# Patient Record
Sex: FEMALE | Race: WHITE | NOT HISPANIC OR LATINO | Employment: UNEMPLOYED | URBAN - METROPOLITAN AREA
[De-identification: names, ages, dates, MRNs, and addresses within clinical notes are randomized per-mention and may not be internally consistent; named-entity substitution may affect disease eponyms.]

---

## 2017-03-07 ENCOUNTER — HOSPITAL ENCOUNTER (OUTPATIENT)
Dept: RADIOLOGY | Facility: MEDICAL CENTER | Age: 52
End: 2017-03-07
Attending: PHYSICIAN ASSISTANT
Payer: COMMERCIAL

## 2017-03-07 ENCOUNTER — OFFICE VISIT (OUTPATIENT)
Dept: URGENT CARE | Facility: PHYSICIAN GROUP | Age: 52
End: 2017-03-07
Payer: COMMERCIAL

## 2017-03-07 VITALS
HEART RATE: 90 BPM | RESPIRATION RATE: 16 BRPM | DIASTOLIC BLOOD PRESSURE: 84 MMHG | OXYGEN SATURATION: 97 % | SYSTOLIC BLOOD PRESSURE: 138 MMHG | WEIGHT: 218 LBS | TEMPERATURE: 98.7 F | BODY MASS INDEX: 35.2 KG/M2

## 2017-03-07 DIAGNOSIS — S20.212A CONTUSION OF RIB ON LEFT SIDE, INITIAL ENCOUNTER: ICD-10-CM

## 2017-03-07 PROCEDURE — 71101 X-RAY EXAM UNILAT RIBS/CHEST: CPT | Mod: LT

## 2017-03-07 PROCEDURE — 99214 OFFICE O/P EST MOD 30 MIN: CPT | Performed by: PHYSICIAN ASSISTANT

## 2017-03-07 RX ORDER — OXYCODONE HYDROCHLORIDE AND ACETAMINOPHEN 5; 325 MG/1; MG/1
1 TABLET ORAL EVERY 4 HOURS PRN
Qty: 6 TAB | Refills: 0 | Status: SHIPPED | OUTPATIENT
Start: 2017-03-07 | End: 2019-08-19

## 2017-03-07 RX ORDER — OXYCODONE HYDROCHLORIDE AND ACETAMINOPHEN 5; 325 MG/1; MG/1
1-2 TABLET ORAL EVERY 4 HOURS PRN
COMMUNITY
End: 2017-03-07

## 2017-03-07 ASSESSMENT — ENCOUNTER SYMPTOMS
CHILLS: 0
PALPITATIONS: 0
BACK PAIN: 1
FEVER: 0
WHEEZING: 0
SHORTNESS OF BREATH: 1
COUGH: 0

## 2017-03-07 NOTE — MR AVS SNAPSHOT
Angélica Beck   3/7/2017 1:30 PM   Office Visit   MRN: [id number]    Department:  [facility]   Dept Phone:  [phone]    Description:  Female : 1965   Provider:  Derrick Fields PA-C           Reason for Visit     Back Pain  back pain  x4days      Allergies as of 3/7/2017     Allergen Noted Reactions    Vicodin [Hydrocodone-Acetaminophen] 2015   Itching      You were diagnosed with     Contusion of rib on left side, initial encounter   [[id number]]         Vital Signs     Blood Pressure Pulse Temperature Respirations Weight Oxygen Saturation    138/84 mmHg 90 37.1 °C (98.7 °F) 16 98.884 kg (218 lb) 97%    Smoking Status                   Never Smoker            Basic Information     Date Of Birth Sex Race Ethnicity Preferred Language    1965 Female White Non- English      Problem List              ICD-10-CM Priority Class Noted - Resolved    BMI 34.0-34.9,adult Z68.34   10/9/2014 - Present    Stress incontinence N39.3   10/9/2014 - Present    Female stress incontinence N39.3   12/3/2014 - Present    Esophageal reflux K21.9   2016 - Present      Health Maintenance        Date Due Completion Dates    COLONOSCOPY 10/16/2013 ---    MAMMOGRAM 10/9/2014 10/9/2013 (Prv Comp)    Override on 10/9/2013: Previously completed    IMM INFLUENZA (1) 2016 ---    IMM DTaP/Tdap/Td Vaccine (2 - Td) 2022            Current Immunizations     Tdap Vaccine 2012      Below and/or attached are the medications your provider expects you to take. Review all of your home medications and newly ordered medications with your provider and/or pharmacist. Follow medication instructions as directed by your provider and/or pharmacist. Please keep your medication list with you and share with your provider. Update the information when medications are discontinued, doses are changed, or new medications (including over-the-counter products) are added; and carry medication  information at all times in the event of emergency situations     Allergies:  VICODIN - Itching               Medications  Valid as of: March 07, 2017 -  2:46 PM    Generic Name Brand Name Tablet Size Instructions for use    HydrOXYzine HCl (Tab) ATARAX 25 MG Take 1 Tab by mouth 3 times a day as needed for Itching.        Omeprazole (CAPSULE DELAYED RELEASE) PRILOSEC 20 MG Take 1 Cap by mouth every day.        Oxycodone-Acetaminophen (Tab) PERCOCET 5-325 MG Take 1-2 Tabs by mouth every four hours as needed.        PARoxetine HCl (TABLET SR 24 HR) PAXIL-CR 12.5 MG Take 1 Tab by mouth every day.        PARoxetine Mesylate (Cap) PARoxetine Mesylate 7.5 MG Take 1 Capsule by mouth every bedtime.        Triamcinolone Acetonide (Ointment) KENALOG 0.1 % Apply thin layer to affected area twice daily as needed.        .                 Medicines prescribed today were sent to:     [facility] [address] [place] [address] [address] [place] [address]. & [facility]    [address] [facility] [address]    Phone: [phone] Fax: [phone]    Open 24 Hours?: No    [facility] [address] [facility] [address] [facility]    [address] [facility] [address]    Phone: [phone] Fax: [phone]    Open 24 Hours?: No      Medication refill instructions:       If your prescription bottle indicates you have medication refills left, it is not necessary to call your provider’s office. Please contact your pharmacy and they will refill your medication.    If your prescription bottle indicates you do not have any refills left, you may request refills at any time through one of the following ways: The online BluelightApp system (except Urgent Care), by calling your provider’s office, or by asking your pharmacy to contact your provider’s office with a refill request. Medication refills are processed only during regular business hours and may not be available until the next business day. Your provider may request  additional information or to have a follow-up visit with you prior to refilling your medication.   *Please Note: Medication refills are assigned a new Rx number when refilled electronically. Your pharmacy may indicate that no refills were authorized even though a new prescription for the same medication is available at the pharmacy. Please request the medicine by name with the pharmacy before contacting your provider for a refill.        Your To Do List     Future Labs/Procedures Complete By Expires    [id number] (WITH 1-VIEW CXR) LEFT  As directed 3/7/2018         [email or web address] Access Code: [email or web address]  Expires: 4/6/2017  1:23 PM    Jifiti.com  A secure, online tool to manage your health information     [employer]’s Jifiti.com® is a secure, online tool that connects you to your personalized health information from the privacy of your home -- day or night - making it very easy for you to manage your healthcare. Once the activation process is completed, you can even access your medical information using the Jifiti.com lucho, which is available for free in the Apple Lucho store or Google Play store.     Jifiti.com provides the following levels of access (as shown below):   My Chart Features   [facility] Doctor [facility]  Specialists [facility]  Urgent  Care Non-[facility]  Primary Care  Doctor   Email your healthcare team securely and privately 24/7 X X X    Manage appointments: schedule your next appointment; view details of past/upcoming appointments X      Request prescription refills. X      View recent personal medical records, including lab and immunizations X X X X   View health record, including health history, allergies, medications X X X X   Read reports about your outpatient visits, procedures, consult and ER notes X X X X   See your discharge summary, which is a recap of your hospital and/or ER visit that includes your diagnosis, lab results, and care plan. X X       How to register for [email or web address]:  1. Go to   [email or web address].  2. Click on the Sign Up Now box, which takes you to the New Member Sign Up page. You will need to provide the following information:  a. Enter your Gondola Access Code exactly as it appears at the top of this page. (You will not need to use this code after you’ve completed the sign-up process. If you do not sign up before the expiration date, you must request a new code.)   b. Enter your date of birth.   c. Enter your home email address.   d. Click Submit, and follow the next screen’s instructions.  3. Create a Gondola ID. This will be your Gondola login ID and cannot be changed, so think of one that is secure and easy to remember.  4. Create a CollabNett password. You can change your password at any time.  5. Enter your Password Reset Question and Answer. This can be used at a later time if you forget your password.   6. Enter your e-mail address. This allows you to receive e-mail notifications when new information is available in Gondola.  7. Click Sign Up. You can now view your health information.    For assistance activating your Gondola account, call [phone]

## 2017-03-07 NOTE — PROGRESS NOTES
Subjective:      Angélica Beck is a 51 y.o. female who presents with Back Pain            Rib Injury  This is a new problem. The current episode started in the past 7 days. The problem occurs constantly. The problem has been gradually worsening. Pertinent negatives include no chest pain, chills, coughing or fever. The symptoms are aggravated by sneezing and coughing. She has tried NSAIDs and oral narcotics for the symptoms. The treatment provided mild relief.   Patient's friend hugged her to try and she felt a rib pop on the left lateral side. Since then she's been having increasing pain especially with deep breathing.      PMH:  has a past medical history of Disease, jaw; Unspecified urinary incontinence; Anesthesia; and Cancer (CMS-[facility]).  MEDS:   Current outpatient prescriptions:   •  oxycodone-acetaminophen (PERCOCET) 5-325 MG Tab, Take 1-2 Tabs by mouth every four hours as needed., Disp: , Rfl:   •  omeprazole (PRILOSEC) 20 MG delayed-release capsule, Take 1 Cap by mouth every day., Disp: 30 Cap, Rfl: 3  •  PARoxetine Mesylate (BRISDELLE) 7.5 MG Cap, Take 1 Capsule by mouth every bedtime., Disp: 30 Cap, Rfl: 1  •  triamcinolone acetonide (KENALOG) 0.1 % Ointment, Apply thin layer to affected area twice daily as needed., Disp: 45 g, Rfl: 0  •  hydrOXYzine (ATARAX) 25 MG Tab, Take 1 Tab by mouth 3 times a day as needed for Itching., Disp: 30 Tab, Rfl: 0  •  paroxetine (PAXIL-CR) 12.5 MG CR tablet, Take 1 Tab by mouth every day., Disp: 30 Tab, Rfl: 11  ALLERGIES:   Allergies   Allergen Reactions   • Vicodin [Hydrocodone-Acetaminophen] Itching     SURGHX:   Past Surgical History   Procedure Laterality Date   • Other orthopedic surgery       shoulder arth   • Tubal coagulation laparoscopic bilateral     • Hysterectomy laparoscopy       fibroidsand endometriosis   • Lumpectomy       breast reduction   • Other       BREAST REDUCTION. with implants   • Bladder sling female  12/3/2014     Performed by Darell Ferris,  M.D. at SURGERY [facility]     SOCHX:  reports that she has never smoked. She has never used smokeless tobacco. She reports that she drinks alcohol. She reports that she does not use illicit drugs.  FH: family history includes Cancer in her maternal grandmother and other; Diabetes in her father; Other in her father.      Review of Systems   Constitutional: Negative for fever and chills.   Respiratory: Positive for shortness of breath. Negative for cough and wheezing.    Cardiovascular: Negative for chest pain, palpitations and leg swelling.   Musculoskeletal: Positive for back pain.        Rib injury       Medications, Allergies, and current problem list reviewed today in Epic     Objective:     /84 mmHg  Pulse 90  Temp(Src) 37.1 °C (98.7 °F)  Resp 16  Wt 98.884 kg (218 lb)  SpO2 97%     Physical Exam   Constitutional: She is oriented to person, place, and time. She appears well-developed and well-nourished. No distress.   HENT:   Head: Normocephalic and atraumatic.   Eyes: Conjunctivae and EOM are normal.   Neck: Normal range of motion. Neck supple.   Cardiovascular: Normal rate, regular rhythm and normal heart sounds.    Pulmonary/Chest: Effort normal and breath sounds normal. No respiratory distress. She has no wheezes. She has no rales. Chest wall is not dull to percussion. She exhibits tenderness and bony tenderness. She exhibits no edema, no deformity, no swelling and no retraction.       Neurological: She is alert and oriented to person, place, and time.   Skin: Skin is warm and dry. She is not diaphoretic.   Psychiatric: She has a normal mood and affect. Her behavior is normal. Judgment and thought content normal.   Nursing note and vitals reviewed.         Xray: no fracture or dislocation by my read. Radiology review pending.       Assessment/Plan:     1. Contusion of rib on left side, initial encounter  [id number] (WITH 1-VIEW CXR) LEFT    oxycodone-acetaminophen (PERCOCET) 5-325 MG  Tab     X-ray negative. Likely a soft tissue injury. PO2 97%  OTC meds and conservative measures as discussed  [facility] Aware web site evaluation: I have obtained and reviewed patient utilization report from [facility] pharmacy database prior to writing prescription for controlled substance II, III or IV. Based on the report and my clinical assessment the prescription is medically necessary.   Patient is cautioned on sedation potential of narcotic medication; no drinking, driving or operating heavy machinery while on this medication.  Encourage deep breathing  Return to clinic or go to ED if symptoms worsen or persist. Indications for ED discussed at length. Patient voices understanding. Follow-up with your primary care provider in 3-5 days. Red flags discussed.    Please note that this dictation was created using voice recognition software. I have made every reasonable attempt to correct obvious errors, but I expect that there are errors of grammar and possibly content that I did not discover before finalizing the note.

## 2018-05-02 ENCOUNTER — OFFICE VISIT (OUTPATIENT)
Dept: MEDICAL GROUP | Facility: CLINIC | Age: 53
End: 2018-05-02
Payer: COMMERCIAL

## 2018-05-02 VITALS
OXYGEN SATURATION: 97 % | BODY MASS INDEX: 31.18 KG/M2 | TEMPERATURE: 97.8 F | HEART RATE: 81 BPM | DIASTOLIC BLOOD PRESSURE: 70 MMHG | WEIGHT: 194 LBS | SYSTOLIC BLOOD PRESSURE: 118 MMHG | RESPIRATION RATE: 14 BRPM | HEIGHT: 66 IN

## 2018-05-02 DIAGNOSIS — R19.7 DIARRHEA, UNSPECIFIED TYPE: ICD-10-CM

## 2018-05-02 DIAGNOSIS — R11.2 NAUSEA AND VOMITING IN ADULT: ICD-10-CM

## 2018-05-02 PROCEDURE — 99214 OFFICE O/P EST MOD 30 MIN: CPT | Performed by: FAMILY MEDICINE

## 2018-05-02 RX ORDER — ONDANSETRON 4 MG/1
4 TABLET, FILM COATED ORAL EVERY 4 HOURS PRN
Qty: 20 TAB | Refills: 0 | Status: SHIPPED | OUTPATIENT
Start: 2018-05-02 | End: 2019-08-19

## 2018-05-02 ASSESSMENT — PATIENT HEALTH QUESTIONNAIRE - PHQ9: CLINICAL INTERPRETATION OF PHQ2 SCORE: 0

## 2018-05-02 NOTE — LETTER
May 2, 2018       Patient: Angélica Beck   YOB: 1965   Date of Visit: 5/2/2018         To Whom It May Concern:    It is my medical opinion that Angélica Beck remain out of work until 2/7/18.    If you have any questions or concerns, please don't hesitate to call [phone]          Sincerely,          Mica Madison M.D.  Electronically Signed

## 2018-05-02 NOTE — PROGRESS NOTES
CC: Diarrhea, vomiting, abdominal pain    HPI:    The patient is a 52-year-old white female who woke up last night with nausea and vomiting and watery diarrhea. She says about 10 years ago she had C. difficile and it smells the same. Denies any blood in her stool. She is having abdominal pain that waxes and wanes. Currently it is is about 5 out of 10 in intensity. She says she generally avoids antibiotics because of her history of C. difficile which she got after taking a course of antibiotics. However, her boyfriend was diagnosed with pneumonia last week and she felt sick as well. She had an old prescription for Cefdinir which she took for about 5 days. She says that her abdomen started feeling queasy a couple of days ago.  She drank a few sips of water this morning which she could keep down but couldn't drink any more than that.  Denies fever, chills, chest pain, chest congestion. She has a slight cough. Denies head congestion. Denies change in bladder habits.      Patient Active Problem List    Diagnosis Date Noted   • Esophageal reflux 01/19/2016   • Female stress incontinence 12/03/2014   • BMI 34.0-34.9,adult 10/09/2014   • Stress incontinence 10/09/2014         Current Outpatient Prescriptions:   •  ondansetron (ZOFRAN) 4 MG Tab tablet, Take 1 Tab by mouth every four hours as needed for Nausea/Vomiting., Disp: 20 Tab, Rfl: 0  •  oxycodone-acetaminophen (PERCOCET) 5-325 MG Tab, Take 1 Tab by mouth every four hours as needed., Disp: 6 Tab, Rfl: 0  •  omeprazole (PRILOSEC) 20 MG delayed-release capsule, Take 1 Cap by mouth every day., Disp: 30 Cap, Rfl: 3  •  PARoxetine Mesylate (BRISDELLE) 7.5 MG Cap, Take 1 Capsule by mouth every bedtime., Disp: 30 Cap, Rfl: 1  •  triamcinolone acetonide (KENALOG) 0.1 % Ointment, Apply thin layer to affected area twice daily as needed., Disp: 45 g, Rfl: 0  •  hydrOXYzine (ATARAX) 25 MG Tab, Take 1 Tab by mouth 3 times a day as needed for Itching., Disp: 30 Tab, Rfl: 0  •   "paroxetine (PAXIL-CR) 12.5 MG CR tablet, Take 1 Tab by mouth every day., Disp: 30 Tab, Rfl: 11    Allergies as of 05/02/2018 - Reviewed 05/02/2018   Allergen Reaction Noted   • Vicodin [hydrocodone-acetaminophen] Itching 09/13/2015       Social History     Social History   • Marital status: Single     Spouse name: N/A   • Number of children: N/A   • Years of education: N/A     Occupational History   • Not on file.     Social History Main Topics   • Smoking status: Never Smoker   • Smokeless tobacco: Never Used   • Alcohol use Yes      Comment: few times a week   • Drug use: No   • Sexual activity: Not on file     Other Topics Concern   • Not on file     Social History Narrative   • No narrative on file       Family History   Problem Relation Age of Onset   • Diabetes Father    • Other Father      parkinsons   • Cancer Other      ovarian   • Cancer Maternal Grandmother      ovarian       Past Medical History:   Diagnosis Date   • Anesthesia     PONV   • Cancer (HCC)     basal cell chest   • Disease, jaw     BONE INFECTION   • Unspecified urinary incontinence        Past Surgical History:   Procedure Laterality Date   • BLADDER SLING FEMALE  12/3/2014    Performed by Darell Ferris M.D. at SURGERY [facility]   • HYSTERECTOMY LAPAROSCOPY      fibroidsand endometriosis   • LUMPECTOMY      breast reduction   • OTHER      BREAST REDUCTION. with implants   • OTHER ORTHOPEDIC SURGERY      shoulder arth   • TUBAL COAGULATION LAPAROSCOPIC BILATERAL         ROS:  As documented above in the history of present illness.    /70   Pulse 81   Temp 36.6 °C (97.8 °F)   Resp 14   Ht 1.676 m (5' 6\")   Wt 88 kg (194 lb)   SpO2 97%   Breastfeeding? No   BMI 31.31 kg/m²     Physical Exam:      GEN:  Alert, oriented, in no distress  HEENT;  PERRLA, EOMI  NECK;  Supple without adenopathy   LUNGS:  Clear to auscultation  CV:  Heart RRR without murmurs or S3 or S4  ABD:  Soft, diffuse mild to moderate tenderness to palpation " without guarding or rebound, nondistended, normal bowel sounds.  No hepatosplenomegaly.  EXT:  Without cyanosis, clubbing, or edema.  NEURO:  Cranial nerves II through XII intact.  Motor function and sensation intact.      Assessment and Plan:     1. Diarrhea, unspecified type    - CDIFF BY PCR; ordered stat  -Treat depending upon results    2. Nausea and vomiting in adult    - ondansetron (ZOFRAN) 4 MG Tab tablet; Take 1 Tab by mouth every four hours as needed for Nausea/Vomiting.  Dispense: 20 Tab; Refill: 0  -Clear liquids for 24 hours, then advance diet as tolerated.  -She is instructed that if she cannot keep fluids down she will need to go to the ER. She expresses understanding of these instructions.      Follow-up pending C. difficile results.          Please note that this dictation was created using voice recognition software. I have worked with consultants from the vendor as well as technical experts from [facility] to optimize the interface. I have made every reasonable attempt to correct obvious errors, but I expect there are errors of steffi and possibly content that I did not discover before finalizing the note.

## 2018-07-19 ENCOUNTER — OFFICE VISIT (OUTPATIENT)
Dept: MEDICAL GROUP | Facility: PHYSICIAN GROUP | Age: 53
End: 2018-07-19
Payer: COMMERCIAL

## 2018-07-19 VITALS
RESPIRATION RATE: 16 BRPM | TEMPERATURE: 96.7 F | SYSTOLIC BLOOD PRESSURE: 110 MMHG | WEIGHT: 191.2 LBS | HEART RATE: 74 BPM | BODY MASS INDEX: 30.73 KG/M2 | DIASTOLIC BLOOD PRESSURE: 72 MMHG | HEIGHT: 66 IN | OXYGEN SATURATION: 97 %

## 2018-07-19 DIAGNOSIS — Z13.29 SCREENING FOR ENDOCRINE DISORDER: ICD-10-CM

## 2018-07-19 DIAGNOSIS — Z13.6 SCREENING FOR CARDIOVASCULAR CONDITION: ICD-10-CM

## 2018-07-19 DIAGNOSIS — Z98.84 S/P BARIATRIC SURGERY: ICD-10-CM

## 2018-07-19 DIAGNOSIS — I83.90 VARICOSE VEIN OF LEG: ICD-10-CM

## 2018-07-19 PROCEDURE — 99214 OFFICE O/P EST MOD 30 MIN: CPT | Performed by: FAMILY MEDICINE

## 2018-07-19 NOTE — PROGRESS NOTES
Chief Complaint   Patient presents with   • Varicose Veins   • Labs Only   • Foot Pain       HISTORY OF PRESENT ILLNESS: Patient is a 52 y.o. female established patient here today for the following concerns:    1. Varicose vein of leg  This is a very pleasant 52 year old female here today to request referral to have her varicose veins taken care of.  She works as a  and is on her feet all the time.  Has tried compression in the past, but now is having pain and swelling despite elevating and compression.  Her mother had a varicose vein break in the kitchen one day and had to go to the hospital to have it ligated.  She is hoping to have them taken care of before they worsen.  She also has been having bilateral dorsal foot pain and swelling.     2. S/P bariatric surgery  3. Screening for endocrine disorder  4. Screening for cardiovascular condition  Underwent gastric sleeve in [place] last year.  She is hoping to have some updated labs to check in on her health status.  She is now down about 20+ lbs!            Past Medical, Social, and Family history reviewed and updated in EPIC    Allergies:Vicodin [hydrocodone-acetaminophen]    Current Outpatient Prescriptions   Medication Sig Dispense Refill   • BIOTIN PO Take  by mouth.     • COLLAGEN PO Take  by mouth.     • Multiple Vitamins-Calcium (ONE-A-DAY WOMENS PO) Take  by mouth.     • MAGNESIUM PO Take  by mouth.     • CALCIUM PO Take  by mouth.     • ondansetron (ZOFRAN) 4 MG Tab tablet Take 1 Tab by mouth every four hours as needed for Nausea/Vomiting. 20 Tab 0   • oxycodone-acetaminophen (PERCOCET) 5-325 MG Tab Take 1 Tab by mouth every four hours as needed. 6 Tab 0   • omeprazole (PRILOSEC) 20 MG delayed-release capsule Take 1 Cap by mouth every day. 30 Cap 3   • PARoxetine Mesylate (BRISDELLE) 7.5 MG Cap Take 1 Capsule by mouth every bedtime. 30 Cap 1   • triamcinolone acetonide (KENALOG) 0.1 % Ointment Apply thin layer to affected area twice daily as  "needed. 45 g 0   • hydrOXYzine (ATARAX) 25 MG Tab Take 1 Tab by mouth 3 times a day as needed for Itching. 30 Tab 0   • paroxetine (PAXIL-CR) 12.5 MG CR tablet Take 1 Tab by mouth every day. 30 Tab 11     No current facility-administered medications for this visit.          ROS:  Review of Systems   Constitutional: Negative for fever, chills, weight loss and malaise/fatigue.   HENT: Negative for ear pain, nosebleeds, congestion, sore throat and neck pain.    Eyes: Negative for blurred vision.   Respiratory: Negative for cough, sputum production, shortness of breath and wheezing.    Cardiovascular: Negative for chest pain, palpitations,  and leg swelling.   Gastrointestinal: Negative for heartburn, nausea, vomiting, diarrhea and abdominal pain.   Genitourinary: Negative for dysuria, urgency and frequency.   Musculoskeletal: Negative for myalgias, back pain and joint pain.   Skin: Negative for rash and itching.   Neurological: Negative for dizziness, tingling, tremors, sensory change, focal weakness and headaches.   Endo/Heme/Allergies: Does not bruise/bleed easily.   Psychiatric/Behavioral: Negative for depression, anxiety, suicidal ideas, insomnia and memory loss.      Exam:  Blood pressure 110/72, pulse 74, temperature 35.9 °C (96.7 °F), resp. rate 16, height 1.676 m (5' 6\"), weight 86.7 kg (191 lb 3.2 oz), SpO2 97 %.    General:  Well nourished, well developed in NAD  Head is grossly normal.  Neck: Supple without JVD   Pulmonary:  Normal effort.   Cardiovascular: Regular rate  Extremities: no clubbing, cyanosis, or bilateral edema, varicosities and spider veins bilaterally.  Psych: affect appropriate      Please note that this dictation was created using voice recognition software. I have made every reasonable attempt to correct obvious errors, but I expect that there are errors of grammar and possibly content that I did not discover before finalizing the note.    Assessment/Plan:  1. Varicose vein of leg  - " REFERRAL TO OTHER    2. S/P bariatric surgery  - CBC WITH DIFFERENTIAL; Future  - COMP METABOLIC PANEL; Future  - TSH WITH REFLEX TO FT4; Future  - IRON/TOTAL IRON BIND; Future  - FERRITIN; Future  - VITAMIN B12; Future  - LIPID PROFILE; Future    3. Screening for endocrine disorder    - TSH WITH REFLEX TO FT4; Future    4. Screening for cardiovascular condition  - COMP METABOLIC PANEL; Future  - LIPID PROFILE; Future

## 2018-07-25 ENCOUNTER — HOSPITAL ENCOUNTER (OUTPATIENT)
Dept: LAB | Facility: MEDICAL CENTER | Age: 53
End: 2018-07-25
Attending: FAMILY MEDICINE
Payer: COMMERCIAL

## 2018-07-25 DIAGNOSIS — Z13.6 SCREENING FOR CARDIOVASCULAR CONDITION: ICD-10-CM

## 2018-07-25 DIAGNOSIS — Z13.29 SCREENING FOR ENDOCRINE DISORDER: ICD-10-CM

## 2018-07-25 DIAGNOSIS — Z98.84 S/P BARIATRIC SURGERY: ICD-10-CM

## 2018-07-25 LAB
ALBUMIN SERPL BCP-MCNC: 4.2 G/DL (ref 3.2–4.9)
ALBUMIN/GLOB SERPL: 1.6 G/DL
ALP SERPL-CCNC: 57 U/L (ref 30–99)
ALT SERPL-CCNC: 17 U/L (ref 2–50)
ANION GAP SERPL CALC-SCNC: 6 MMOL/L (ref 0–11.9)
AST SERPL-CCNC: 20 U/L (ref 12–45)
BASOPHILS # BLD AUTO: 0.7 % (ref 0–1.8)
BASOPHILS # BLD: 0.05 K/UL (ref 0–0.12)
BILIRUB SERPL-MCNC: 0.8 MG/DL (ref 0.1–1.5)
BUN SERPL-MCNC: 21 MG/DL (ref 8–22)
CALCIUM SERPL-MCNC: 9.5 MG/DL (ref 8.5–10.5)
CHLORIDE SERPL-SCNC: 104 MMOL/L (ref 96–112)
CHOLEST SERPL-MCNC: 211 MG/DL (ref 100–199)
CO2 SERPL-SCNC: 30 MMOL/L (ref 20–33)
CREAT SERPL-MCNC: 0.77 MG/DL (ref 0.5–1.4)
EOSINOPHIL # BLD AUTO: 0.28 K/UL (ref 0–0.51)
EOSINOPHIL NFR BLD: 3.7 % (ref 0–6.9)
ERYTHROCYTE [DISTWIDTH] IN BLOOD BY AUTOMATED COUNT: 54.8 FL (ref 35.9–50)
FERRITIN SERPL-MCNC: 25.5 NG/ML (ref 10–291)
GLOBULIN SER CALC-MCNC: 2.7 G/DL (ref 1.9–3.5)
GLUCOSE SERPL-MCNC: 73 MG/DL (ref 65–99)
HCT VFR BLD AUTO: 45 % (ref 37–47)
HDLC SERPL-MCNC: 82 MG/DL
HGB BLD-MCNC: 15.2 G/DL (ref 12–16)
IMM GRANULOCYTES # BLD AUTO: 0.01 K/UL (ref 0–0.11)
IMM GRANULOCYTES NFR BLD AUTO: 0.1 % (ref 0–0.9)
IRON SATN MFR SERPL: 28 % (ref 15–55)
IRON SERPL-MCNC: 104 UG/DL (ref 40–170)
LDLC SERPL CALC-MCNC: 121 MG/DL
LYMPHOCYTES # BLD AUTO: 2.27 K/UL (ref 1–4.8)
LYMPHOCYTES NFR BLD: 29.7 % (ref 22–41)
MCH RBC QN AUTO: 33.1 PG (ref 27–33)
MCHC RBC AUTO-ENTMCNC: 33.8 G/DL (ref 33.6–35)
MCV RBC AUTO: 98 FL (ref 81.4–97.8)
MONOCYTES # BLD AUTO: 0.58 K/UL (ref 0–0.85)
MONOCYTES NFR BLD AUTO: 7.6 % (ref 0–13.4)
NEUTROPHILS # BLD AUTO: 4.45 K/UL (ref 2–7.15)
NEUTROPHILS NFR BLD: 58.2 % (ref 44–72)
NRBC # BLD AUTO: 0 K/UL
NRBC BLD-RTO: 0 /100 WBC
PLATELET # BLD AUTO: 328 K/UL (ref 164–446)
PMV BLD AUTO: 10.5 FL (ref 9–12.9)
POTASSIUM SERPL-SCNC: 4.4 MMOL/L (ref 3.6–5.5)
PROT SERPL-MCNC: 6.9 G/DL (ref 6–8.2)
RBC # BLD AUTO: 4.59 M/UL (ref 4.2–5.4)
SODIUM SERPL-SCNC: 140 MMOL/L (ref 135–145)
TIBC SERPL-MCNC: 374 UG/DL (ref 250–450)
TRIGL SERPL-MCNC: 42 MG/DL (ref 0–149)
TSH SERPL DL<=0.005 MIU/L-ACNC: 1.32 UIU/ML (ref 0.38–5.33)
VIT B12 SERPL-MCNC: 876 PG/ML (ref 211–911)
WBC # BLD AUTO: 7.6 K/UL (ref 4.8–10.8)

## 2018-07-25 PROCEDURE — 80061 LIPID PANEL: CPT

## 2018-07-25 PROCEDURE — 83550 IRON BINDING TEST: CPT

## 2018-07-25 PROCEDURE — 82728 ASSAY OF FERRITIN: CPT

## 2018-07-25 PROCEDURE — 82607 VITAMIN B-12: CPT

## 2018-07-25 PROCEDURE — 85025 COMPLETE CBC W/AUTO DIFF WBC: CPT

## 2018-07-25 PROCEDURE — 84443 ASSAY THYROID STIM HORMONE: CPT

## 2018-07-25 PROCEDURE — 83540 ASSAY OF IRON: CPT

## 2018-07-25 PROCEDURE — 36415 COLL VENOUS BLD VENIPUNCTURE: CPT

## 2018-07-25 PROCEDURE — 80053 COMPREHEN METABOLIC PANEL: CPT

## 2018-07-26 ENCOUNTER — TELEPHONE (OUTPATIENT)
Dept: MEDICAL GROUP | Facility: PHYSICIAN GROUP | Age: 53
End: 2018-07-26

## 2018-07-26 NOTE — TELEPHONE ENCOUNTER
----- Message from Sandra Frazier M.D. sent at 7/26/2018  8:34 AM PDT -----  Blood tests are all normal

## 2018-09-05 ENCOUNTER — TELEPHONE (OUTPATIENT)
Dept: MEDICAL GROUP | Facility: PHYSICIAN GROUP | Age: 53
End: 2018-09-05

## 2018-09-05 DIAGNOSIS — M79.671 FOOT PAIN, BILATERAL: ICD-10-CM

## 2018-09-05 DIAGNOSIS — M79.672 FOOT PAIN, BILATERAL: ICD-10-CM

## 2018-09-05 NOTE — TELEPHONE ENCOUNTER
Pt states the vascular doctor said it was ok to be seen by ortho; that she didn't need to wait.    1. Caller Name: Angélica Beck                                           Call Back Number: [phone] (home) [phone] (work)        Patient approves a detailed voicemail message: N\A    2. SPECIFIC Action To Be Taken: Referral pending, please sign.    3. Diagnosis/Clinical Reason for Request: bilateral foot pain    4. Specialty & Provider Name/Lab/Imaging Location: [facility]    5. Is appointment scheduled for requested order/referral: no    Patient was informed they will receive a return phone call from the office ONLY if there are any questions before processing their request. Advised to call back if they haven't received a call from the referral department in 5 days.

## 2018-12-14 ENCOUNTER — TELEPHONE (OUTPATIENT)
Dept: MEDICAL GROUP | Facility: PHYSICIAN GROUP | Age: 53
End: 2018-12-14

## 2018-12-14 NOTE — TELEPHONE ENCOUNTER
1. Caller Name: Angélica                                          Call Back Number: [phone] (home) [phone] (work)       Patient approves a detailed voicemail message: N\A    Pt called stating she is going out of country to [facility]. Pt wanted to know if we would give her/tell her all the immunizations that she would nee. pt was told to contact a travel clinic to find out which immunizations she would need. Then [facility] with med group

## 2018-12-17 NOTE — TELEPHONE ENCOUNTER
VOICEMAIL  1. Caller Name: Angélica                       Call Back Number: [phone] (home) [phone] (work)     2. Message: Shots they pt needs are Tdap, Hep A, Tyfoid and Malaria.     3. Patient approves office to leave a detailed voicemail/MyChart message: N\A    [facility]

## 2019-01-02 ENCOUNTER — APPOINTMENT (OUTPATIENT)
Dept: RADIOLOGY | Facility: MEDICAL CENTER | Age: 54
End: 2019-01-02
Attending: SURGERY
Payer: COMMERCIAL

## 2019-06-13 ENCOUNTER — HOSPITAL ENCOUNTER (OUTPATIENT)
Dept: RADIOLOGY | Facility: MEDICAL CENTER | Age: 54
End: 2019-06-13
Attending: FAMILY MEDICINE
Payer: COMMERCIAL

## 2019-06-13 ENCOUNTER — OFFICE VISIT (OUTPATIENT)
Dept: URGENT CARE | Facility: PHYSICIAN GROUP | Age: 54
End: 2019-06-13
Payer: COMMERCIAL

## 2019-06-13 VITALS
HEIGHT: 66 IN | WEIGHT: 191 LBS | TEMPERATURE: 97.6 F | HEART RATE: 103 BPM | RESPIRATION RATE: 12 BRPM | DIASTOLIC BLOOD PRESSURE: 84 MMHG | BODY MASS INDEX: 30.7 KG/M2 | SYSTOLIC BLOOD PRESSURE: 98 MMHG | OXYGEN SATURATION: 93 %

## 2019-06-13 DIAGNOSIS — R10.84 GENERALIZED ABDOMINAL PAIN: ICD-10-CM

## 2019-06-13 PROCEDURE — 76705 ECHO EXAM OF ABDOMEN: CPT

## 2019-06-13 PROCEDURE — 99213 OFFICE O/P EST LOW 20 MIN: CPT | Performed by: FAMILY MEDICINE

## 2019-06-13 RX ORDER — SUCRALFATE 1 G/1
1 TABLET ORAL
Qty: 120 TAB | Refills: 0 | Status: SHIPPED | OUTPATIENT
Start: 2019-06-13 | End: 2019-08-19

## 2019-06-13 ASSESSMENT — ENCOUNTER SYMPTOMS
DIARRHEA: 0
CONSTIPATION: 0
ABDOMINAL PAIN: 1
FEVER: 0
NAUSEA: 1
VOMITING: 0

## 2019-06-13 NOTE — PROGRESS NOTES
"Subjective:   Angélica Beck is a 53 y.o. female who presents for Abdominal Pain (severe pain in the R & center, vomitting, nausea (symptoms worsening)  x6 weeks)        Abdominal Pain   This is a new problem. The current episode started more than 1 month ago. The onset quality is undetermined. The problem occurs intermittently. The problem has been waxing and waning. The pain is located in the generalized abdominal region. The pain is moderate. The quality of the pain is colicky and cramping. Associated symptoms include nausea. Pertinent negatives include no constipation, diarrhea, fever or vomiting.     Review of Systems   Constitutional: Negative for fever.   Gastrointestinal: Positive for abdominal pain and nausea. Negative for constipation, diarrhea and vomiting.     Allergies   Allergen Reactions   • Vicodin [Hydrocodone-Acetaminophen] Itching      Objective:   BP (!) 98/84 (BP Location: Right arm, Patient Position: Sitting, BP Cuff Size: Adult)   Pulse (!) 103   Temp 36.4 °C (97.6 °F) (Temporal)   Resp 12   Ht 1.676 m (5' 6\")   Wt 86.6 kg (191 lb)   SpO2 93%   BMI 30.83 kg/m²   Physical Exam   Constitutional: She is oriented to person, place, and time. She appears well-developed and well-nourished. No distress.   HENT:   Head: Normocephalic and atraumatic.   Eyes: Pupils are equal, round, and reactive to light. Conjunctivae and EOM are normal.   Cardiovascular: Normal rate and regular rhythm.    No murmur heard.  Pulmonary/Chest: Effort normal and breath sounds normal. No respiratory distress.   Abdominal: Soft. She exhibits no distension. There is tenderness. There is no rebound and no guarding.   Neurological: She is alert and oriented to person, place, and time. She has normal reflexes. No sensory deficit.   Skin: Skin is warm and dry.   Psychiatric: She has a normal mood and affect.         Assessment/Plan:   1. Generalized abdominal pain  - US-RUQ; Future  - sucralfate (CARAFATE) 1 GM Tab; Take " 1 Tab by mouth 4 Times a Day,Before Meals and at Bedtime.  Dispense: 120 Tab; Refill: 0    Differential diagnosis, natural history, supportive care, and indications for immediate follow-up discussed.

## 2019-07-30 ENCOUNTER — APPOINTMENT (OUTPATIENT)
Dept: RADIOLOGY | Facility: MEDICAL CENTER | Age: 54
End: 2019-07-30
Attending: FAMILY MEDICINE
Payer: COMMERCIAL

## 2019-08-19 SDOH — HEALTH STABILITY: MENTAL HEALTH: HOW MANY STANDARD DRINKS CONTAINING ALCOHOL DO YOU HAVE ON A TYPICAL DAY?: 1 OR 2

## 2019-08-19 SDOH — HEALTH STABILITY: MENTAL HEALTH: HOW OFTEN DO YOU HAVE A DRINK CONTAINING ALCOHOL?: 2-3 TIMES A WEEK

## 2019-08-20 ENCOUNTER — ANESTHESIA (OUTPATIENT)
Dept: SURGERY | Facility: MEDICAL CENTER | Age: 54
End: 2019-08-20
Payer: COMMERCIAL

## 2019-08-20 ENCOUNTER — HOSPITAL ENCOUNTER (OUTPATIENT)
Facility: MEDICAL CENTER | Age: 54
End: 2019-08-20
Attending: SURGERY | Admitting: SURGERY
Payer: COMMERCIAL

## 2019-08-20 ENCOUNTER — ANESTHESIA EVENT (OUTPATIENT)
Dept: SURGERY | Facility: MEDICAL CENTER | Age: 54
End: 2019-08-20
Payer: COMMERCIAL

## 2019-08-20 VITALS
WEIGHT: 199.96 LBS | DIASTOLIC BLOOD PRESSURE: 72 MMHG | TEMPERATURE: 97.4 F | HEIGHT: 66 IN | SYSTOLIC BLOOD PRESSURE: 119 MMHG | RESPIRATION RATE: 14 BRPM | HEART RATE: 83 BPM | OXYGEN SATURATION: 92 % | BODY MASS INDEX: 32.14 KG/M2

## 2019-08-20 DIAGNOSIS — G89.18 POST-OPERATIVE PAIN: ICD-10-CM

## 2019-08-20 LAB — PATHOLOGY CONSULT NOTE: NORMAL

## 2019-08-20 PROCEDURE — 700101 HCHG RX REV CODE 250: Performed by: ANESTHESIOLOGY

## 2019-08-20 PROCEDURE — 501577 HCHG TROCAR, STEP 11MM: Performed by: SURGERY

## 2019-08-20 PROCEDURE — 160039 HCHG SURGERY MINUTES - EA ADDL 1 MIN LEVEL 3: Performed by: SURGERY

## 2019-08-20 PROCEDURE — 160048 HCHG OR STATISTICAL LEVEL 1-5: Performed by: SURGERY

## 2019-08-20 PROCEDURE — 88304 TISSUE EXAM BY PATHOLOGIST: CPT

## 2019-08-20 PROCEDURE — A9270 NON-COVERED ITEM OR SERVICE: HCPCS | Performed by: ANESTHESIOLOGY

## 2019-08-20 PROCEDURE — 700111 HCHG RX REV CODE 636 W/ 250 OVERRIDE (IP): Performed by: ANESTHESIOLOGY

## 2019-08-20 PROCEDURE — 160036 HCHG PACU - EA ADDL 30 MINS PHASE I: Performed by: SURGERY

## 2019-08-20 PROCEDURE — 700102 HCHG RX REV CODE 250 W/ 637 OVERRIDE(OP): Performed by: ANESTHESIOLOGY

## 2019-08-20 PROCEDURE — 160009 HCHG ANES TIME/MIN: Performed by: SURGERY

## 2019-08-20 PROCEDURE — 501838 HCHG SUTURE GENERAL: Performed by: SURGERY

## 2019-08-20 PROCEDURE — 160002 HCHG RECOVERY MINUTES (STAT): Performed by: SURGERY

## 2019-08-20 PROCEDURE — 700105 HCHG RX REV CODE 258: Performed by: SURGERY

## 2019-08-20 PROCEDURE — 160028 HCHG SURGERY MINUTES - 1ST 30 MINS LEVEL 3: Performed by: SURGERY

## 2019-08-20 PROCEDURE — 500002 HCHG ADHESIVE, DERMABOND: Performed by: SURGERY

## 2019-08-20 PROCEDURE — 160035 HCHG PACU - 1ST 60 MINS PHASE I: Performed by: SURGERY

## 2019-08-20 PROCEDURE — 160025 RECOVERY II MINUTES (STATS): Performed by: SURGERY

## 2019-08-20 PROCEDURE — 700101 HCHG RX REV CODE 250: Performed by: SURGERY

## 2019-08-20 PROCEDURE — 501399 HCHG SPECIMAN BAG, ENDO CATC: Performed by: SURGERY

## 2019-08-20 PROCEDURE — 502571 HCHG PACK, LAP CHOLE: Performed by: SURGERY

## 2019-08-20 PROCEDURE — 160046 HCHG PACU - 1ST 60 MINS PHASE II: Performed by: SURGERY

## 2019-08-20 RX ORDER — OXYCODONE HYDROCHLORIDE AND ACETAMINOPHEN 5; 325 MG/1; MG/1
1-2 TABLET ORAL EVERY 4 HOURS PRN
Qty: 30 TAB | Refills: 0 | Status: SHIPPED | OUTPATIENT
Start: 2019-08-20 | End: 2019-08-25

## 2019-08-20 RX ORDER — HYDROMORPHONE HYDROCHLORIDE 1 MG/ML
0.1 INJECTION, SOLUTION INTRAMUSCULAR; INTRAVENOUS; SUBCUTANEOUS
Status: DISCONTINUED | OUTPATIENT
Start: 2019-08-20 | End: 2019-08-20 | Stop reason: HOSPADM

## 2019-08-20 RX ORDER — CEFAZOLIN SODIUM 1 G/3ML
INJECTION, POWDER, FOR SOLUTION INTRAMUSCULAR; INTRAVENOUS PRN
Status: DISCONTINUED | OUTPATIENT
Start: 2019-08-20 | End: 2019-08-20 | Stop reason: HOSPADM

## 2019-08-20 RX ORDER — MIDAZOLAM HYDROCHLORIDE 1 MG/ML
1 INJECTION INTRAMUSCULAR; INTRAVENOUS
Status: DISCONTINUED | OUTPATIENT
Start: 2019-08-20 | End: 2019-08-20 | Stop reason: HOSPADM

## 2019-08-20 RX ORDER — LIDOCAINE HYDROCHLORIDE 20 MG/ML
INJECTION, SOLUTION EPIDURAL; INFILTRATION; INTRACAUDAL; PERINEURAL PRN
Status: DISCONTINUED | OUTPATIENT
Start: 2019-08-20 | End: 2019-08-20 | Stop reason: SURG

## 2019-08-20 RX ORDER — OXYCODONE HYDROCHLORIDE AND ACETAMINOPHEN 5; 325 MG/1; MG/1
2 TABLET ORAL
Status: COMPLETED | OUTPATIENT
Start: 2019-08-20 | End: 2019-08-20

## 2019-08-20 RX ORDER — HYDRALAZINE HYDROCHLORIDE 20 MG/ML
5 INJECTION INTRAMUSCULAR; INTRAVENOUS
Status: DISCONTINUED | OUTPATIENT
Start: 2019-08-20 | End: 2019-08-20 | Stop reason: HOSPADM

## 2019-08-20 RX ORDER — HALOPERIDOL 5 MG/ML
1 INJECTION INTRAMUSCULAR
Status: DISCONTINUED | OUTPATIENT
Start: 2019-08-20 | End: 2019-08-20 | Stop reason: HOSPADM

## 2019-08-20 RX ORDER — BUPIVACAINE HYDROCHLORIDE AND EPINEPHRINE 5; 5 MG/ML; UG/ML
INJECTION, SOLUTION EPIDURAL; INTRACAUDAL; PERINEURAL
Status: DISCONTINUED | OUTPATIENT
Start: 2019-08-20 | End: 2019-08-20 | Stop reason: HOSPADM

## 2019-08-20 RX ORDER — SODIUM CHLORIDE, SODIUM LACTATE, POTASSIUM CHLORIDE, CALCIUM CHLORIDE 600; 310; 30; 20 MG/100ML; MG/100ML; MG/100ML; MG/100ML
INJECTION, SOLUTION INTRAVENOUS CONTINUOUS
Status: DISCONTINUED | OUTPATIENT
Start: 2019-08-20 | End: 2019-08-20 | Stop reason: HOSPADM

## 2019-08-20 RX ORDER — ONDANSETRON 4 MG/1
4 TABLET, FILM COATED ORAL EVERY 4 HOURS PRN
Qty: 10 TAB | Refills: 1 | Status: SHIPPED | OUTPATIENT
Start: 2019-08-20 | End: 2019-08-23

## 2019-08-20 RX ORDER — ROCURONIUM BROMIDE 10 MG/ML
INJECTION, SOLUTION INTRAVENOUS PRN
Status: DISCONTINUED | OUTPATIENT
Start: 2019-08-20 | End: 2019-08-20 | Stop reason: SURG

## 2019-08-20 RX ORDER — OXYCODONE HYDROCHLORIDE AND ACETAMINOPHEN 5; 325 MG/1; MG/1
1 TABLET ORAL
Status: COMPLETED | OUTPATIENT
Start: 2019-08-20 | End: 2019-08-20

## 2019-08-20 RX ORDER — METOPROLOL TARTRATE 1 MG/ML
1 INJECTION, SOLUTION INTRAVENOUS
Status: DISCONTINUED | OUTPATIENT
Start: 2019-08-20 | End: 2019-08-20 | Stop reason: HOSPADM

## 2019-08-20 RX ORDER — HYDROMORPHONE HYDROCHLORIDE 1 MG/ML
0.4 INJECTION, SOLUTION INTRAMUSCULAR; INTRAVENOUS; SUBCUTANEOUS
Status: DISCONTINUED | OUTPATIENT
Start: 2019-08-20 | End: 2019-08-20 | Stop reason: HOSPADM

## 2019-08-20 RX ORDER — BUPIVACAINE HYDROCHLORIDE AND EPINEPHRINE 5; 5 MG/ML; UG/ML
INJECTION, SOLUTION EPIDURAL; INTRACAUDAL; PERINEURAL
Status: DISCONTINUED
Start: 2019-08-20 | End: 2019-08-20 | Stop reason: HOSPADM

## 2019-08-20 RX ORDER — HYDROMORPHONE HYDROCHLORIDE 1 MG/ML
0.2 INJECTION, SOLUTION INTRAMUSCULAR; INTRAVENOUS; SUBCUTANEOUS
Status: DISCONTINUED | OUTPATIENT
Start: 2019-08-20 | End: 2019-08-20 | Stop reason: HOSPADM

## 2019-08-20 RX ORDER — ONDANSETRON 2 MG/ML
INJECTION INTRAMUSCULAR; INTRAVENOUS PRN
Status: DISCONTINUED | OUTPATIENT
Start: 2019-08-20 | End: 2019-08-20 | Stop reason: SURG

## 2019-08-20 RX ORDER — MIDAZOLAM HYDROCHLORIDE 1 MG/ML
INJECTION INTRAMUSCULAR; INTRAVENOUS PRN
Status: DISCONTINUED | OUTPATIENT
Start: 2019-08-20 | End: 2019-08-20 | Stop reason: SURG

## 2019-08-20 RX ORDER — SUCCINYLCHOLINE CHLORIDE 20 MG/ML
INJECTION INTRAMUSCULAR; INTRAVENOUS PRN
Status: DISCONTINUED | OUTPATIENT
Start: 2019-08-20 | End: 2019-08-20 | Stop reason: SURG

## 2019-08-20 RX ORDER — METOCLOPRAMIDE HYDROCHLORIDE 5 MG/ML
INJECTION INTRAMUSCULAR; INTRAVENOUS PRN
Status: DISCONTINUED | OUTPATIENT
Start: 2019-08-20 | End: 2019-08-20 | Stop reason: SURG

## 2019-08-20 RX ORDER — DEXAMETHASONE SODIUM PHOSPHATE 4 MG/ML
INJECTION, SOLUTION INTRA-ARTICULAR; INTRALESIONAL; INTRAMUSCULAR; INTRAVENOUS; SOFT TISSUE PRN
Status: DISCONTINUED | OUTPATIENT
Start: 2019-08-20 | End: 2019-08-20 | Stop reason: SURG

## 2019-08-20 RX ORDER — MEPERIDINE HYDROCHLORIDE 25 MG/ML
12.5 INJECTION INTRAMUSCULAR; INTRAVENOUS; SUBCUTANEOUS
Status: DISCONTINUED | OUTPATIENT
Start: 2019-08-20 | End: 2019-08-20 | Stop reason: HOSPADM

## 2019-08-20 RX ORDER — METOPROLOL TARTRATE 1 MG/ML
INJECTION, SOLUTION INTRAVENOUS PRN
Status: DISCONTINUED | OUTPATIENT
Start: 2019-08-20 | End: 2019-08-20 | Stop reason: SURG

## 2019-08-20 RX ORDER — ONDANSETRON 2 MG/ML
4 INJECTION INTRAMUSCULAR; INTRAVENOUS
Status: DISCONTINUED | OUTPATIENT
Start: 2019-08-20 | End: 2019-08-20 | Stop reason: HOSPADM

## 2019-08-20 RX ORDER — DIPHENHYDRAMINE HYDROCHLORIDE 50 MG/ML
12.5 INJECTION INTRAMUSCULAR; INTRAVENOUS
Status: DISCONTINUED | OUTPATIENT
Start: 2019-08-20 | End: 2019-08-20 | Stop reason: HOSPADM

## 2019-08-20 RX ORDER — LABETALOL HYDROCHLORIDE 5 MG/ML
5 INJECTION, SOLUTION INTRAVENOUS
Status: DISCONTINUED | OUTPATIENT
Start: 2019-08-20 | End: 2019-08-20 | Stop reason: HOSPADM

## 2019-08-20 RX ADMIN — FENTANYL CITRATE 250 MCG: 50 INJECTION, SOLUTION INTRAMUSCULAR; INTRAVENOUS at 13:58

## 2019-08-20 RX ADMIN — ONDANSETRON 4 MG: 2 INJECTION INTRAMUSCULAR; INTRAVENOUS at 13:58

## 2019-08-20 RX ADMIN — LIDOCAINE HYDROCHLORIDE 50 ML: 20 INJECTION, SOLUTION EPIDURAL; INFILTRATION; INTRACAUDAL; PERINEURAL at 13:58

## 2019-08-20 RX ADMIN — DEXAMETHASONE SODIUM PHOSPHATE 4 MG: 4 INJECTION, SOLUTION INTRA-ARTICULAR; INTRALESIONAL; INTRAMUSCULAR; INTRAVENOUS; SOFT TISSUE at 14:12

## 2019-08-20 RX ADMIN — OXYCODONE HYDROCHLORIDE AND ACETAMINOPHEN 2 TABLET: 5; 325 TABLET ORAL at 15:58

## 2019-08-20 RX ADMIN — CEFAZOLIN 2 G: 330 INJECTION, POWDER, FOR SOLUTION INTRAMUSCULAR; INTRAVENOUS at 13:58

## 2019-08-20 RX ADMIN — PROPOFOL 200 MG: 10 INJECTION, EMULSION INTRAVENOUS at 13:58

## 2019-08-20 RX ADMIN — METOCLOPRAMIDE 10 MG: 5 INJECTION, SOLUTION INTRAMUSCULAR; INTRAVENOUS at 14:20

## 2019-08-20 RX ADMIN — ROCURONIUM BROMIDE 50 MG: 10 INJECTION, SOLUTION INTRAVENOUS at 13:58

## 2019-08-20 RX ADMIN — FENTANYL CITRATE 50 MCG: 50 INJECTION, SOLUTION INTRAMUSCULAR; INTRAVENOUS at 15:08

## 2019-08-20 RX ADMIN — PROPOFOL 25 MCG/KG/MIN: 10 INJECTION, EMULSION INTRAVENOUS at 14:15

## 2019-08-20 RX ADMIN — SUGAMMADEX 200 MG: 100 INJECTION, SOLUTION INTRAVENOUS at 14:44

## 2019-08-20 RX ADMIN — SUCCINYLCHOLINE CHLORIDE 100 MG: 20 INJECTION, SOLUTION INTRAMUSCULAR; INTRAVENOUS at 13:58

## 2019-08-20 RX ADMIN — METOPROLOL TARTRATE 5 MG: 5 INJECTION, SOLUTION INTRAVENOUS at 14:42

## 2019-08-20 RX ADMIN — MIDAZOLAM 2 MG: 1 INJECTION INTRAMUSCULAR; INTRAVENOUS at 13:58

## 2019-08-20 RX ADMIN — MEPERIDINE HYDROCHLORIDE 12.5 MG: 25 INJECTION INTRAMUSCULAR; INTRAVENOUS; SUBCUTANEOUS at 15:12

## 2019-08-20 RX ADMIN — SODIUM CHLORIDE, POTASSIUM CHLORIDE, SODIUM LACTATE AND CALCIUM CHLORIDE: 600; 310; 30; 20 INJECTION, SOLUTION INTRAVENOUS at 12:20

## 2019-08-20 ASSESSMENT — PAIN SCALES - GENERAL: PAIN_LEVEL: 3

## 2019-08-20 NOTE — ANESTHESIA PREPROCEDURE EVALUATION
Relevant Problems   GI   (+) Esophageal reflux       Physical Exam    Airway   Mallampati: II  TM distance: >3 FB  Neck ROM: full       Cardiovascular - normal exam  Rhythm: regular  Rate: normal  (-) murmur     Dental - normal exam         Pulmonary - normal exam  Breath sounds clear to auscultation     Abdominal    Neurological - normal exam                 Anesthesia Plan    ASA 2       Plan - general       Airway plan will be ETT  (>1 hr of PONV for a prior GA. Will give zofran and decadron and reglan, and switch from sevo to tiva after the gb is removed to try and mitigate PONV for this surgery)      Induction: intravenous    Postoperative Plan: Postoperative administration of opioids is intended.    Pertinent diagnostic labs and testing reviewed    Informed Consent:    Anesthetic plan and risks discussed with patient.    Use of blood products discussed with: patient whom consented to blood products.

## 2019-08-20 NOTE — DISCHARGE INSTRUCTIONS
ACTIVITY: Rest and take it easy for the first 24 hours.  A responsible adult is recommended to remain with you during that time.  It is normal to feel sleepy.  We encourage you to not do anything that requires balance, judgment or coordination.    MILD FLU-LIKE SYMPTOMS ARE NORMAL. YOU MAY EXPERIENCE GENERALIZED MUSCLE ACHES, THROAT IRRITATION, HEADACHE AND/OR SOME NAUSEA.    FOR 24 HOURS DO NOT:  Drive, operate machinery or run household appliances.  Drink beer or alcoholic beverages.   Make important decisions or sign legal documents.    SPECIAL INSTRUCTIONS: see hand-out    DIET: To avoid nausea, slowly advance diet as tolerated, avoiding spicy or greasy foods for the first day.  Add more substantial food to your diet according to your physician's instructions.  Babies can be fed formula or breast milk as soon as they are hungry.  INCREASE FLUIDS AND FIBER TO AVOID CONSTIPATION.    SURGICAL DRESSING/BATHING: May shower tomorrow, no tub baths/hot tubs/swimming.     FOLLOW-UP APPOINTMENT:  A follow-up appointment should be arranged with your doctor ; call to schedule.    You should CALL YOUR PHYSICIAN if you develop:  Fever greater than 101 degrees F.  Pain not relieved by medication, or persistent nausea or vomiting.  Excessive bleeding (blood soaking through dressing) or unexpected drainage from the wound.  Extreme redness or swelling around the incision site, drainage of pus or foul smelling drainage.  Inability to urinate or empty your bladder within 8 hours.  Problems with breathing or chest pain.    You should call 911 if you develop problems with breathing or chest pain.  If you are unable to contact your doctor or surgical center, you should go to the nearest emergency room or urgent care center.  Physician's telephone #: Dr. Olivo [phone]    If any questions arise, call your doctor.  If your doctor is not available, please feel free to call the Surgical Center at [phone].  The Center is open  Monday through Friday from 7AM to 7PM.  You can also call the HEALTH HOTLINE open 24 hours/day, 7 days/week and speak to a nurse at [phone], or toll free at [phone].    A registered nurse may call you a few days after your surgery to see how you are doing after your procedure.    MEDICATIONS: Resume taking daily medication.  Take prescribed pain medication with food.  If no medication is prescribed, you may take non-aspirin pain medication if needed.  PAIN MEDICATION CAN BE VERY CONSTIPATING.  Take a stool softener or laxative such as senokot, pericolace, or milk of magnesia if needed.    Prescription given for percocet, zofran.  Last pain medication given at 4 PM, next dose at 8 PM.    If your physician has prescribed pain medication that includes Acetaminophen (Tylenol), do not take additional Acetaminophen (Tylenol) while taking the prescribed medication.    Depression / Suicide Risk    As you are discharged from this [facility] facility, it is important to learn how to keep safe from harming yourself.    Recognize the warning signs:  · Abrupt changes in personality, positive or negative- including increase in energy   · Giving away possessions  · Change in eating patterns- significant weight changes-  positive or negative  · Change in sleeping patterns- unable to sleep or sleeping all the time   · Unwillingness or inability to communicate  · Depression  · Unusual sadness, discouragement and loneliness  · Talk of wanting to die  · Neglect of personal appearance   · Rebelliousness- reckless behavior  · Withdrawal from people/activities they love  · Confusion- inability to concentrate     If you or a loved one observes any of these behaviors or has concerns about self-harm, here's what you can do:  · Talk about it- your feelings and reasons for harming yourself  · Remove any means that you might use to hurt yourself (examples: pills, rope, extension cords, firearm)  · Get professional help from the  community (Mental Health, Substance Abuse, psychological counseling)  · Do not be alone:Call your Safe Contact- someone whom you trust who will be there for you.  · Call your local CRISIS HOTLINE 131-8639 or 393-927-8715  · Call your local Children's Mobile Crisis Response Team [place] (030) 427-1946 or www.[employer]  · Call the toll free National Suicide Prevention Hotlines   · National Suicide Prevention Lifeline 635-192-VRRF (8973)  · National Hope Line Network 800-SUICIDE (073-5814)

## 2019-08-20 NOTE — OR SURGEON
Immediate Post OP Note    PreOp Diagnosis: chronic cholecystitis    PostOp Diagnosis: chronic cholecystitis    Procedure(s):  CHOLECYSTECTOMY, LAPAROSCOPIC - Wound Class: Clean Contaminated    Surgeon(s):  NICOLE Douglas M.D.    Anesthesiologist/Type of Anesthesia:  Anesthesiologist: Griffin Joseph M.D./General    Surgical Staff:  Circulator: Sandra Encarnacion R.N.  Relief Circulator: Maynor Pritchard R.N.  Scrub Person: Dixon Salazar    Specimens removed if any:  ID Type Source Tests Collected by Time Destination   A :  Tissue Gallbladder PATHOLOGY SPECIMEN Lior Olivo M.D. 8/20/2019  2:35 PM        Estimated Blood Loss: 25 cc    Findings: adhesions    Complications: none        8/20/2019 2:52 PM Lior Olivo M.D.

## 2019-08-20 NOTE — ANESTHESIA PROCEDURE NOTES
Airway  Date/Time: 8/20/2019 1:58 PM  Performed by: Griffin Joseph M.D.  Authorized by: Griffin Joseph M.D.     Location:  OR  Urgency:  Elective  Indications for Airway Management:  Anesthesia  Spontaneous Ventilation: absent    Sedation Level:  Deep  Preoxygenated: Yes    Patient Position:  Sniffing  Final Airway Type:  Endotracheal airway  Final Endotracheal Airway:  ETT  Cuffed: Yes    Technique Used for Successful ETT Placement:  Direct laryngoscopy  Insertion Site:  Oral  Blade Type:  Yefri  Laryngoscope Blade/Videolaryngoscope Blade Size:  3  ETT Size (mm):  7.0  Measured from:  Teeth  ETT to Teeth (cm):  20  Placement Verified by: auscultation and capnometry    Cormack-Lehane Classification:  Grade I - full view of glottis  Number of Attempts at Approach:  1

## 2019-08-20 NOTE — OR NURSING
1457 Patient arrived from OR on [facility]. Report received from anesthesia and RN. Oral airway in place. Will continue to monitor.   1500 Pt woke up, airway out.   1508 Fentanyl IV given for 7/10 pain.  1512 Pt shivering, medicated with demerol.  1530 Pt's mother at bedside.  1559 Pt denying nausea, medicated with percocet.  1605 Pt meets criteria for phase 2.   1620 Patient verbalized readiness to go home. Discharge instructions discussed with patient and mother, copy given. Patient verbalized understanding to instructions. Prescriptions given . Belongings with patient.   1445 Discharge criteria met. PIV out, Discharged via wheelchair.

## 2019-08-20 NOTE — ANESTHESIA POSTPROCEDURE EVALUATION
Patient: Angélica Beck    Procedure Summary     Date:  08/20/19 Room / Location:  [facility] ROOM 24 / SURGERY SAME DAY [facility]    Anesthesia Start:  1403 Anesthesia Stop:      Procedure:  CHOLECYSTECTOMY, LAPAROSCOPIC (Abdomen) Diagnosis:  (CHOLELITHIASIS WITH CHRONIC CHOLECYSTITIS WITHOUT OBSTRUCTION)    Surgeon:  Lior Olivo M.D. Responsible Provider:  Griffin Joseph M.D.    Anesthesia Type:  general ASA Status:  2          Final Anesthesia Type: general  Last vitals  BP   Blood Pressure: 156/98    Temp   37.2 °C (99 °F)    Pulse   Pulse: (!) 105(pt anxious about procedure.)   Resp   18    SpO2   96 %      Anesthesia Post Evaluation    Patient location during evaluation: PACU  Patient participation: complete - patient participated  Level of consciousness: awake and alert  Pain score: 3    Airway patency: patent  Anesthetic complications: no  Cardiovascular status: hemodynamically stable  Respiratory status: acceptable  Hydration status: euvolemic    PONV: none           Nurse Pain Score: 0 (NPRS)

## 2019-08-20 NOTE — OP REPORT
DATE OF SERVICE:  08/20/2019    SURGEON:  Lior Olivo MD    ASSISTANT:  Anson Leblanc MD    PREOPERATIVE DIAGNOSIS:  Chronic cholecystitis.    POSTOPERATIVE DIAGNOSIS:  Chronic cholecystitis.    PROCEDURE PERFORMED:  Laparoscopic cholecystectomy.    ANESTHESIA:  General endotracheal anesthesia.    ANESTHESIOLOGIST:  Griffin Joseph MD    INDICATIONS:  A 53-year-old woman with symptomatic stones and sludge and   chronic cholecystitis.  A cholecystectomy is indicated.    DESCRIPTION OF PROCEDURE:  The procedure was discussed in detail with the   patient including the laparoscopic approach, potential for converting to an   open procedure, associated risk of bleeding, infection, abscess, and hematoma.    I also discussed risk of postoperative bile leak, common duct stricture,   common duct transection, and significance of these complications.  She   understood all the above and wished to proceed.  She was placed under   anesthesia by Dr. Joseph.  Her abdomen was prepped and draped with   chlorhexidine prep and sterile drapes.  After a time-out, infraumbilical   incision was made and through this incision, peritoneal cavity was entered   using the open Seldinger technique.  Pneumoperitoneum was achieved to   insufflation pressure of 15 mmHg.  Under direct visualization, a 12 mm   subxiphoid and two 5 mm subcostal ports were placed.  The gallbladder was   identified and retracted superiorly and laterally.  Base of gallbladder was   carefully dissected and adhesions were taken down.  The cystic duct was   identified, could be seen to clearly exit the gallbladder and track laterally   along with the gallbladder.  In similar fashion, cystic artery was identified,   dissected away from surrounding connective tissue.  A critical view was   obtained and subsequent to this, 3 clips were placed proximally and distally   and the duct and was divided sharply with scissors.  In similar fashion,   cystic artery was clipped  twice proximally and distally and divided sharply   with scissors.  Gallbladder was then dissected off the liver, placed in a bag   retrieval device and removed.  Hemostasis assured with cautery.  There was no   evidence of any bleeding or bile leak.  Ports were removed.  Pneumoperitoneum   was released.  Infraumbilical fascia was approximated with 0 Vicryl stitches.    Subcutaneous tissue was irrigated and the skin was approximated with 4-0   Vicryl sutures.  Dermabond was placed.  Patient tolerated the procedure well   without apparent complication.  Final counts were reported as correct.       ____________________________________     MD AZAM OLIVA / AMY    DD:  08/20/2019 15:13:04  DT:  08/20/2019 15:23:24    D#:  [id number]  Job#:  [id number]    cc: REGGIE DIXON MD

## 2019-08-20 NOTE — ANESTHESIA TIME REPORT
Anesthesia Start and Stop Event Times     Date Time Event    8/20/2019 1251 Ready for Procedure     1403 Anesthesia Start     1459 Anesthesia Stop        Responsible Staff  08/20/19    Name Role Begin End    Griffin Joseph M.D. Anesth 1403 1458        Preop Diagnosis (Free Text):  Pre-op Diagnosis     CHOLELITHIASIS WITH CHRONIC CHOLECYSTITIS WITHOUT OBSTRUCTION        Preop Diagnosis (Codes):    Post op Diagnosis  Gallstones      Premium Reason  Non-Premium    Comments:

## 2019-09-17 ENCOUNTER — APPOINTMENT (OUTPATIENT)
Dept: RADIOLOGY | Facility: MEDICAL CENTER | Age: 54
DRG: 381 | End: 2019-09-17
Attending: EMERGENCY MEDICINE
Payer: COMMERCIAL

## 2019-09-17 ENCOUNTER — APPOINTMENT (OUTPATIENT)
Dept: RADIOLOGY | Facility: MEDICAL CENTER | Age: 54
DRG: 381 | End: 2019-09-17
Attending: STUDENT IN AN ORGANIZED HEALTH CARE EDUCATION/TRAINING PROGRAM
Payer: COMMERCIAL

## 2019-09-17 ENCOUNTER — HOSPITAL ENCOUNTER (INPATIENT)
Facility: MEDICAL CENTER | Age: 54
LOS: 1 days | DRG: 381 | End: 2019-09-18
Attending: EMERGENCY MEDICINE | Admitting: INTERNAL MEDICINE
Payer: COMMERCIAL

## 2019-09-17 DIAGNOSIS — K92.2 ACUTE UPPER GI BLEED: ICD-10-CM

## 2019-09-17 DIAGNOSIS — K92.1 GASTROINTESTINAL HEMORRHAGE WITH MELENA: ICD-10-CM

## 2019-09-17 DIAGNOSIS — K21.9 GASTROESOPHAGEAL REFLUX DISEASE, ESOPHAGITIS PRESENCE NOT SPECIFIED: ICD-10-CM

## 2019-09-17 PROBLEM — E87.6 HYPOKALEMIA: Status: ACTIVE | Noted: 2019-09-17

## 2019-09-17 PROBLEM — K70.10 ALCOHOLIC HEPATITIS: Status: ACTIVE | Noted: 2019-09-17

## 2019-09-17 PROBLEM — D75.89 MACROCYTOSIS WITHOUT ANEMIA: Status: ACTIVE | Noted: 2019-09-17

## 2019-09-17 PROBLEM — F10.29 ALCOHOL DEPENDENCE WITH UNSPECIFIED ALCOHOL-INDUCED DISORDER (HCC): Status: ACTIVE | Noted: 2019-09-17

## 2019-09-17 LAB
25(OH)D3 SERPL-MCNC: 55 NG/ML (ref 30–100)
ABO GROUP BLD: NORMAL
ALBUMIN SERPL BCP-MCNC: 4.4 G/DL (ref 3.2–4.9)
ALBUMIN/GLOB SERPL: 1.4 G/DL
ALP SERPL-CCNC: 67 U/L (ref 30–99)
ALT SERPL-CCNC: 77 U/L (ref 2–50)
ANION GAP SERPL CALC-SCNC: 15 MMOL/L (ref 0–11.9)
APTT PPP: 26.3 SEC (ref 24.7–36)
AST SERPL-CCNC: 87 U/L (ref 12–45)
BASOPHILS # BLD AUTO: 0.4 % (ref 0–1.8)
BASOPHILS # BLD: 0.04 K/UL (ref 0–0.12)
BILIRUB SERPL-MCNC: 2.2 MG/DL (ref 0.1–1.5)
BLD GP AB SCN SERPL QL: NORMAL
BUN SERPL-MCNC: 22 MG/DL (ref 8–22)
CALCIUM SERPL-MCNC: 9.9 MG/DL (ref 8.5–10.5)
CHLORIDE SERPL-SCNC: 96 MMOL/L (ref 96–112)
CO2 SERPL-SCNC: 27 MMOL/L (ref 20–33)
CREAT SERPL-MCNC: 0.74 MG/DL (ref 0.5–1.4)
EOSINOPHIL # BLD AUTO: 0.04 K/UL (ref 0–0.51)
EOSINOPHIL NFR BLD: 0.4 % (ref 0–6.9)
ERYTHROCYTE [DISTWIDTH] IN BLOOD BY AUTOMATED COUNT: 50 FL (ref 35.9–50)
ETHANOL BLD-MCNC: 0 G/DL
FERRITIN SERPL-MCNC: 301.6 NG/ML (ref 10–291)
FOLATE SERPL-MCNC: 16.6 NG/ML
GLOBULIN SER CALC-MCNC: 3.2 G/DL (ref 1.9–3.5)
GLUCOSE SERPL-MCNC: 146 MG/DL (ref 65–99)
HCT VFR BLD AUTO: 34.3 % (ref 37–47)
HCT VFR BLD AUTO: 40.8 % (ref 37–47)
HGB BLD-MCNC: 11.2 G/DL (ref 12–16)
HGB BLD-MCNC: 13.7 G/DL (ref 12–16)
IMM GRANULOCYTES # BLD AUTO: 0.04 K/UL (ref 0–0.11)
IMM GRANULOCYTES NFR BLD AUTO: 0.4 % (ref 0–0.9)
INR PPP: 0.9 (ref 0.87–1.13)
IRON SATN MFR SERPL: 47 % (ref 15–55)
IRON SERPL-MCNC: 169 UG/DL (ref 40–170)
LIPASE SERPL-CCNC: 29 U/L (ref 11–82)
LYMPHOCYTES # BLD AUTO: 1.6 K/UL (ref 1–4.8)
LYMPHOCYTES NFR BLD: 15.8 % (ref 22–41)
MAGNESIUM SERPL-MCNC: 1.3 MG/DL (ref 1.5–2.5)
MCH RBC QN AUTO: 34.9 PG (ref 27–33)
MCHC RBC AUTO-ENTMCNC: 33.6 G/DL (ref 33.6–35)
MCV RBC AUTO: 103.8 FL (ref 81.4–97.8)
MONOCYTES # BLD AUTO: 0.92 K/UL (ref 0–0.85)
MONOCYTES NFR BLD AUTO: 9.1 % (ref 0–13.4)
NEUTROPHILS # BLD AUTO: 7.46 K/UL (ref 2–7.15)
NEUTROPHILS NFR BLD: 73.9 % (ref 44–72)
NRBC # BLD AUTO: 0 K/UL
NRBC BLD-RTO: 0 /100 WBC
PHOSPHATE SERPL-MCNC: 2.8 MG/DL (ref 2.5–4.5)
PLATELET # BLD AUTO: 258 K/UL (ref 164–446)
PMV BLD AUTO: 10.3 FL (ref 9–12.9)
POTASSIUM SERPL-SCNC: 3.3 MMOL/L (ref 3.6–5.5)
PROT SERPL-MCNC: 7.6 G/DL (ref 6–8.2)
PROTHROMBIN TIME: 12.3 SEC (ref 12–14.6)
RBC # BLD AUTO: 3.93 M/UL (ref 4.2–5.4)
RH BLD: NORMAL
SODIUM SERPL-SCNC: 138 MMOL/L (ref 135–145)
TIBC SERPL-MCNC: 356 UG/DL (ref 250–450)
VIT B12 SERPL-MCNC: 941 PG/ML (ref 211–911)
WBC # BLD AUTO: 10.1 K/UL (ref 4.8–10.8)

## 2019-09-17 PROCEDURE — 82180 ASSAY OF ASCORBIC ACID: CPT

## 2019-09-17 PROCEDURE — 96376 TX/PRO/DX INJ SAME DRUG ADON: CPT

## 2019-09-17 PROCEDURE — 82728 ASSAY OF FERRITIN: CPT

## 2019-09-17 PROCEDURE — 84590 ASSAY OF VITAMIN A: CPT

## 2019-09-17 PROCEDURE — 80053 COMPREHEN METABOLIC PANEL: CPT | Mod: 91

## 2019-09-17 PROCEDURE — 700102 HCHG RX REV CODE 250 W/ 637 OVERRIDE(OP): Performed by: INTERNAL MEDICINE

## 2019-09-17 PROCEDURE — 700105 HCHG RX REV CODE 258: Performed by: INTERNAL MEDICINE

## 2019-09-17 PROCEDURE — 84100 ASSAY OF PHOSPHORUS: CPT | Mod: 91

## 2019-09-17 PROCEDURE — 84425 ASSAY OF VITAMIN B-1: CPT

## 2019-09-17 PROCEDURE — C9113 INJ PANTOPRAZOLE SODIUM, VIA: HCPCS | Performed by: INTERNAL MEDICINE

## 2019-09-17 PROCEDURE — 0DJ08ZZ INSPECTION OF UPPER INTESTINAL TRACT, VIA NATURAL OR ARTIFICIAL OPENING ENDOSCOPIC: ICD-10-PCS | Performed by: INTERNAL MEDICINE

## 2019-09-17 PROCEDURE — 85576 BLOOD PLATELET AGGREGATION: CPT

## 2019-09-17 PROCEDURE — 96366 THER/PROPH/DIAG IV INF ADDON: CPT

## 2019-09-17 PROCEDURE — 82105 ALPHA-FETOPROTEIN SERUM: CPT

## 2019-09-17 PROCEDURE — 700111 HCHG RX REV CODE 636 W/ 250 OVERRIDE (IP): Performed by: INTERNAL MEDICINE

## 2019-09-17 PROCEDURE — 71045 X-RAY EXAM CHEST 1 VIEW: CPT

## 2019-09-17 PROCEDURE — 500066 HCHG BITE BLOCK, ECT: Performed by: INTERNAL MEDICINE

## 2019-09-17 PROCEDURE — 86900 BLOOD TYPING SEROLOGIC ABO: CPT

## 2019-09-17 PROCEDURE — 85025 COMPLETE CBC W/AUTO DIFF WBC: CPT | Mod: 91

## 2019-09-17 PROCEDURE — 83690 ASSAY OF LIPASE: CPT

## 2019-09-17 PROCEDURE — 160048 HCHG OR STATISTICAL LEVEL 1-5: Performed by: INTERNAL MEDICINE

## 2019-09-17 PROCEDURE — 770020 HCHG ROOM/CARE - TELE (206)

## 2019-09-17 PROCEDURE — 83540 ASSAY OF IRON: CPT

## 2019-09-17 PROCEDURE — 160203 HCHG ENDO MINUTES - 1ST 30 MINS LEVEL 4: Performed by: INTERNAL MEDICINE

## 2019-09-17 PROCEDURE — 85018 HEMOGLOBIN: CPT

## 2019-09-17 PROCEDURE — 85610 PROTHROMBIN TIME: CPT

## 2019-09-17 PROCEDURE — A9270 NON-COVERED ITEM OR SERVICE: HCPCS | Performed by: INTERNAL MEDICINE

## 2019-09-17 PROCEDURE — 80307 DRUG TEST PRSMV CHEM ANLYZR: CPT

## 2019-09-17 PROCEDURE — 86850 RBC ANTIBODY SCREEN: CPT

## 2019-09-17 PROCEDURE — 96375 TX/PRO/DX INJ NEW DRUG ADDON: CPT

## 2019-09-17 PROCEDURE — 83550 IRON BINDING TEST: CPT

## 2019-09-17 PROCEDURE — 502240 HCHG MISC OR SUPPLY RC 0272: Performed by: INTERNAL MEDICINE

## 2019-09-17 PROCEDURE — 3E0234Z INTRODUCTION OF SERUM, TOXOID AND VACCINE INTO MUSCLE, PERCUTANEOUS APPROACH: ICD-10-PCS | Performed by: INTERNAL MEDICINE

## 2019-09-17 PROCEDURE — 86901 BLOOD TYPING SEROLOGIC RH(D): CPT

## 2019-09-17 PROCEDURE — 85384 FIBRINOGEN ACTIVITY: CPT

## 2019-09-17 PROCEDURE — 85730 THROMBOPLASTIN TIME PARTIAL: CPT

## 2019-09-17 PROCEDURE — 82306 VITAMIN D 25 HYDROXY: CPT

## 2019-09-17 PROCEDURE — 96365 THER/PROPH/DIAG IV INF INIT: CPT

## 2019-09-17 PROCEDURE — 99285 EMERGENCY DEPT VISIT HI MDM: CPT

## 2019-09-17 PROCEDURE — 99223 1ST HOSP IP/OBS HIGH 75: CPT | Performed by: INTERNAL MEDICINE

## 2019-09-17 PROCEDURE — HZ2ZZZZ DETOXIFICATION SERVICES FOR SUBSTANCE ABUSE TREATMENT: ICD-10-PCS | Performed by: INTERNAL MEDICINE

## 2019-09-17 PROCEDURE — 82607 VITAMIN B-12: CPT

## 2019-09-17 PROCEDURE — 700105 HCHG RX REV CODE 258: Performed by: EMERGENCY MEDICINE

## 2019-09-17 PROCEDURE — 85014 HEMATOCRIT: CPT

## 2019-09-17 PROCEDURE — 36415 COLL VENOUS BLD VENIPUNCTURE: CPT

## 2019-09-17 PROCEDURE — 84630 ASSAY OF ZINC: CPT

## 2019-09-17 PROCEDURE — 83735 ASSAY OF MAGNESIUM: CPT | Mod: 91

## 2019-09-17 PROCEDURE — 82746 ASSAY OF FOLIC ACID SERUM: CPT

## 2019-09-17 PROCEDURE — 700111 HCHG RX REV CODE 636 W/ 250 OVERRIDE (IP): Performed by: EMERGENCY MEDICINE

## 2019-09-17 PROCEDURE — 85347 COAGULATION TIME ACTIVATED: CPT

## 2019-09-17 PROCEDURE — 90686 IIV4 VACC NO PRSV 0.5 ML IM: CPT | Performed by: INTERNAL MEDICINE

## 2019-09-17 RX ORDER — LORAZEPAM 2 MG/1
2 TABLET ORAL
Status: DISCONTINUED | OUTPATIENT
Start: 2019-09-17 | End: 2019-09-18 | Stop reason: HOSPADM

## 2019-09-17 RX ORDER — PROPOFOL 10 MG/ML
INJECTION, EMULSION INTRAVENOUS
Status: COMPLETED | OUTPATIENT
Start: 2019-09-17 | End: 2019-09-17

## 2019-09-17 RX ORDER — ONDANSETRON 4 MG/1
4 TABLET, ORALLY DISINTEGRATING ORAL EVERY 4 HOURS PRN
Status: DISCONTINUED | OUTPATIENT
Start: 2019-09-17 | End: 2019-09-18 | Stop reason: HOSPADM

## 2019-09-17 RX ORDER — THIAMINE MONONITRATE (VIT B1) 100 MG
100 TABLET ORAL DAILY
Status: DISCONTINUED | OUTPATIENT
Start: 2019-09-18 | End: 2019-09-18 | Stop reason: HOSPADM

## 2019-09-17 RX ORDER — FOLIC ACID 1 MG/1
1 TABLET ORAL DAILY
Status: DISCONTINUED | OUTPATIENT
Start: 2019-09-17 | End: 2019-09-18 | Stop reason: HOSPADM

## 2019-09-17 RX ORDER — LORAZEPAM 2 MG/ML
0.5 INJECTION INTRAMUSCULAR EVERY 4 HOURS PRN
Status: DISCONTINUED | OUTPATIENT
Start: 2019-09-17 | End: 2019-09-18 | Stop reason: HOSPADM

## 2019-09-17 RX ORDER — ONDANSETRON 2 MG/ML
4 INJECTION INTRAMUSCULAR; INTRAVENOUS EVERY 4 HOURS PRN
Status: DISCONTINUED | OUTPATIENT
Start: 2019-09-17 | End: 2019-09-18 | Stop reason: HOSPADM

## 2019-09-17 RX ORDER — LORAZEPAM 2 MG/ML
2 INJECTION INTRAMUSCULAR
Status: DISCONTINUED | OUTPATIENT
Start: 2019-09-17 | End: 2019-09-18 | Stop reason: HOSPADM

## 2019-09-17 RX ORDER — LORAZEPAM 2 MG/ML
1 INJECTION INTRAMUSCULAR
Status: DISCONTINUED | OUTPATIENT
Start: 2019-09-17 | End: 2019-09-18 | Stop reason: HOSPADM

## 2019-09-17 RX ORDER — LORAZEPAM 1 MG/1
1 TABLET ORAL EVERY 4 HOURS PRN
Status: DISCONTINUED | OUTPATIENT
Start: 2019-09-17 | End: 2019-09-18 | Stop reason: HOSPADM

## 2019-09-17 RX ORDER — SODIUM CHLORIDE 9 MG/ML
1000 INJECTION, SOLUTION INTRAVENOUS ONCE
Status: COMPLETED | OUTPATIENT
Start: 2019-09-17 | End: 2019-09-17

## 2019-09-17 RX ORDER — LORAZEPAM 0.5 MG/1
0.5 TABLET ORAL EVERY 4 HOURS PRN
Status: DISCONTINUED | OUTPATIENT
Start: 2019-09-17 | End: 2019-09-18 | Stop reason: HOSPADM

## 2019-09-17 RX ORDER — ONDANSETRON 2 MG/ML
4 INJECTION INTRAMUSCULAR; INTRAVENOUS ONCE
Status: COMPLETED | OUTPATIENT
Start: 2019-09-17 | End: 2019-09-17

## 2019-09-17 RX ORDER — ACETAMINOPHEN 325 MG/1
650 TABLET ORAL EVERY 6 HOURS PRN
Status: DISCONTINUED | OUTPATIENT
Start: 2019-09-17 | End: 2019-09-18 | Stop reason: HOSPADM

## 2019-09-17 RX ORDER — CLONIDINE HYDROCHLORIDE 0.1 MG/1
0.1 TABLET ORAL
Status: DISCONTINUED | OUTPATIENT
Start: 2019-09-17 | End: 2019-09-18 | Stop reason: HOSPADM

## 2019-09-17 RX ORDER — LORAZEPAM 2 MG/1
4 TABLET ORAL
Status: DISCONTINUED | OUTPATIENT
Start: 2019-09-17 | End: 2019-09-18 | Stop reason: HOSPADM

## 2019-09-17 RX ORDER — LORAZEPAM 2 MG/ML
1.5 INJECTION INTRAMUSCULAR
Status: DISCONTINUED | OUTPATIENT
Start: 2019-09-17 | End: 2019-09-18 | Stop reason: HOSPADM

## 2019-09-17 RX ORDER — SUCRALFATE ORAL 1 G/10ML
1 SUSPENSION ORAL EVERY 6 HOURS
Status: DISCONTINUED | OUTPATIENT
Start: 2019-09-17 | End: 2019-09-18 | Stop reason: HOSPADM

## 2019-09-17 RX ADMIN — POTASSIUM CHLORIDE: 149 INJECTION, SOLUTION, CONCENTRATE INTRAVENOUS at 23:59

## 2019-09-17 RX ADMIN — PROPOFOL 30 MG: 10 INJECTION, EMULSION INTRAVENOUS at 11:07

## 2019-09-17 RX ADMIN — PROPOFOL 20 MG: 10 INJECTION, EMULSION INTRAVENOUS at 11:06

## 2019-09-17 RX ADMIN — SODIUM CHLORIDE 8 MG/HR: 9 INJECTION, SOLUTION INTRAVENOUS at 22:20

## 2019-09-17 RX ADMIN — PROPOFOL 20 MG: 10 INJECTION, EMULSION INTRAVENOUS at 11:08

## 2019-09-17 RX ADMIN — ONDANSETRON 4 MG: 2 INJECTION INTRAMUSCULAR; INTRAVENOUS at 08:42

## 2019-09-17 RX ADMIN — SUCRALFATE 1 G: 1 SUSPENSION ORAL at 17:02

## 2019-09-17 RX ADMIN — SODIUM CHLORIDE 80 MG: 9 INJECTION, SOLUTION INTRAVENOUS at 10:37

## 2019-09-17 RX ADMIN — ONDANSETRON 4 MG: 2 INJECTION INTRAMUSCULAR; INTRAVENOUS at 10:23

## 2019-09-17 RX ADMIN — PROPOFOL 20 MG: 10 INJECTION, EMULSION INTRAVENOUS at 11:05

## 2019-09-17 RX ADMIN — FOLIC ACID 1 MG: 1 TABLET ORAL at 12:49

## 2019-09-17 RX ADMIN — PROPOFOL 40 MG: 10 INJECTION, EMULSION INTRAVENOUS at 11:04

## 2019-09-17 RX ADMIN — FAMOTIDINE 20 MG: 10 INJECTION INTRAVENOUS at 08:42

## 2019-09-17 RX ADMIN — SODIUM CHLORIDE 8 MG/HR: 9 INJECTION, SOLUTION INTRAVENOUS at 11:21

## 2019-09-17 RX ADMIN — INFLUENZA A VIRUS A/BRISBANE/02/2018 IVR-190 (H1N1) ANTIGEN (FORMALDEHYDE INACTIVATED), INFLUENZA A VIRUS A/KANSAS/14/2017 X-327 (H3N2) ANTIGEN (FORMALDEHYDE INACTIVATED), INFLUENZA B VIRUS B/PHUKET/3073/2013 ANTIGEN (FORMALDEHYDE INACTIVATED), AND INFLUENZA B VIRUS B/MARYLAND/15/2016 BX-69A ANTIGEN (FORMALDEHYDE INACTIVATED) 0.5 ML: 15; 15; 15; 15 INJECTION, SUSPENSION INTRAMUSCULAR at 16:24

## 2019-09-17 RX ADMIN — SUCRALFATE 1 G: 1 SUSPENSION ORAL at 12:49

## 2019-09-17 RX ADMIN — SUCRALFATE 1 G: 1 SUSPENSION ORAL at 23:59

## 2019-09-17 RX ADMIN — THIAMINE HYDROCHLORIDE 500 MG: 100 INJECTION, SOLUTION INTRAMUSCULAR; INTRAVENOUS at 13:04

## 2019-09-17 RX ADMIN — SODIUM CHLORIDE 1000 ML: 9 INJECTION, SOLUTION INTRAVENOUS at 08:39

## 2019-09-17 RX ADMIN — THERA TABS 1 TABLET: TAB at 12:49

## 2019-09-17 RX ADMIN — POTASSIUM CHLORIDE: 149 INJECTION, SOLUTION, CONCENTRATE INTRAVENOUS at 13:43

## 2019-09-17 RX ADMIN — PROPOFOL 20 MG: 10 INJECTION, EMULSION INTRAVENOUS at 11:09

## 2019-09-17 RX ADMIN — PROPOFOL 20 MG: 10 INJECTION, EMULSION INTRAVENOUS at 11:07

## 2019-09-17 ASSESSMENT — ENCOUNTER SYMPTOMS
DEPRESSION: 0
WHEEZING: 0
WEIGHT LOSS: 0
ABDOMINAL PAIN: 1
HEARTBURN: 1
LOSS OF CONSCIOUSNESS: 0
NAUSEA: 1
SHORTNESS OF BREATH: 0
BLOOD IN STOOL: 1
HEADACHES: 0
PND: 0
BLURRED VISION: 0
MYALGIAS: 0
FEVER: 1
CHILLS: 0
NERVOUS/ANXIOUS: 1
SEIZURES: 0
NECK PAIN: 0
CONSTIPATION: 0
DOUBLE VISION: 0
CHILLS: 1
HEMOPTYSIS: 0
VOMITING: 1
DIAPHORESIS: 1
FALLS: 0
BLOOD IN STOOL: 0
FEVER: 0
DIZZINESS: 1
PALPITATIONS: 0
COUGH: 0
BACK PAIN: 0
DIARRHEA: 1
FLANK PAIN: 0

## 2019-09-17 ASSESSMENT — LIFESTYLE VARIABLES
PAROXYSMAL SWEATS: NO SWEAT VISIBLE
NAUSEA AND VOMITING: *
AVERAGE NUMBER OF DAYS PER WEEK YOU HAVE A DRINK CONTAINING ALCOHOL: 7
TOTAL SCORE: 0
TREMOR: *
DOES PATIENT WANT TO STOP DRINKING: NO
VISUAL DISTURBANCES: NOT PRESENT
ANXIETY: NO ANXIETY (AT EASE)
ORIENTATION AND CLOUDING OF SENSORIUM: ORIENTED AND CAN DO SERIAL ADDITIONS
EVER HAD A DRINK FIRST THING IN THE MORNING TO STEADY YOUR NERVES TO GET RID OF A HANGOVER: NO
AGITATION: NORMAL ACTIVITY
HEADACHE, FULLNESS IN HEAD: VERY MILD
TOTAL SCORE: 0
AUDITORY DISTURBANCES: NOT PRESENT
ORIENTATION AND CLOUDING OF SENSORIUM: ORIENTED AND CAN DO SERIAL ADDITIONS
ANXIETY: NO ANXIETY (AT EASE)
VISUAL DISTURBANCES: NOT PRESENT
NAUSEA AND VOMITING: *
TOTAL SCORE: 5
CONSUMPTION TOTAL: POSITIVE
HOW MANY TIMES IN THE PAST YEAR HAVE YOU HAD 5 OR MORE DRINKS IN A DAY: 2
EVER FELT BAD OR GUILTY ABOUT YOUR DRINKING: NO
AGITATION: NORMAL ACTIVITY
ON A TYPICAL DAY WHEN YOU DRINK ALCOHOL HOW MANY DRINKS DO YOU HAVE: 4
EVER_SMOKED: NEVER
HEADACHE, FULLNESS IN HEAD: NOT PRESENT
ORIENTATION AND CLOUDING OF SENSORIUM: ORIENTED AND CAN DO SERIAL ADDITIONS
HEADACHE, FULLNESS IN HEAD: NOT PRESENT
TOTAL SCORE: 3
PAROXYSMAL SWEATS: NO SWEAT VISIBLE
AUDITORY DISTURBANCES: NOT PRESENT
HAVE PEOPLE ANNOYED YOU BY CRITICIZING YOUR DRINKING: NO
TREMOR: *
AGITATION: NORMAL ACTIVITY
TOTAL SCORE: 0
SUBSTANCE_ABUSE: 1
ANXIETY: NO ANXIETY (AT EASE)
PAROXYSMAL SWEATS: NO SWEAT VISIBLE
VISUAL DISTURBANCES: NOT PRESENT
AUDITORY DISTURBANCES: NOT PRESENT
ALCOHOL_USE: YES
TREMOR: TREMOR NOT VISIBLE BUT CAN BE FELT, FINGERTIP TO FINGERTIP
HAVE YOU EVER FELT YOU SHOULD CUT DOWN ON YOUR DRINKING: NO
NAUSEA AND VOMITING: *
TOTAL SCORE: 5

## 2019-09-17 ASSESSMENT — COGNITIVE AND FUNCTIONAL STATUS - GENERAL
SUGGESTED CMS G CODE MODIFIER DAILY ACTIVITY: CH
MOBILITY SCORE: 24
DAILY ACTIVITIY SCORE: 24
SUGGESTED CMS G CODE MODIFIER MOBILITY: CH

## 2019-09-17 ASSESSMENT — PATIENT HEALTH QUESTIONNAIRE - PHQ9
2. FEELING DOWN, DEPRESSED, IRRITABLE, OR HOPELESS: NOT AT ALL
SUM OF ALL RESPONSES TO PHQ9 QUESTIONS 1 AND 2: 0
1. LITTLE INTEREST OR PLEASURE IN DOING THINGS: NOT AT ALL

## 2019-09-17 NOTE — ASSESSMENT & PLAN NOTE
Secondary to lack of oral intake, nausea and vomiting    Lactated Ringer's with potassium supplementation ordered, interval chemistry panel for tomorrow morning requested

## 2019-09-17 NOTE — OP REPORT
OP Note  Procedure Date: 9/17/2019     PreOp Diagnosis:   Nausea, vomiting, hematemesis, melena    PostOp Diagnosis:   Esophagus: LA-D esophagitis with esophageal ulcer, likely the source of bleeding. No esophageal varices.  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    Procedure(s):  GASTROSCOPY - Wound Class: Clean Contaminated    Surgeon(s):  Xavi Ochoa M.D.    Anesthesiologist/Type of Anesthesia:  No anesthesia staff entered./Other    Surgical Staff:  Endoscopy Technician: Domitila Solis  Sedation/Monitoring Nurse: Chip Hardy R.N.    Specimens removed if any:  * No specimens in log *    Estimated Blood Loss: minimal    Anesthesia/Medications:  see ERP note     COMPLICATIONS:  No immediate complications.    PROCEDURE IN DETAIL, Findings and ENDOSCOPIC DIAGNOSIS:      -Prior to the procedure, a History and Physical was performed, and patient medications and allergies were reviewed. The patient’s tolerance of previous anesthesia was also reviewed. The risks and benefits of the procedure and the sedation options and risks were discussed with the patient. All questions were answered, and informed consent was obtained. The patient was deemed in satisfactory condition to undergo the procedure.    -Prior Anticoagulants: the patient has taken no previous anticoagulant or antiplatelet agents.    -ASA Grade Assessment: II     -The patient was placed in the left lateral decubitus position. The scope was passed under direct vision. Continuous oxygen was provided via nasal cannula and intravenous sedation was administered in divided doses throughout the procedure. The patient’s blood pressure, pulse, and oxygen saturation were monitored continuously throughout the procedure.    -The gastroscope was gently advanced under direct visualization over the tongue, down the esophagus, through  the stomach and into the 2nd portion of the duodenum. The color, texture, mucosa and anatomy of esophagus, stomach and duodenum were carefully examined with the scope. The scope was then withdrawn from the patient and the procedure terminated. Further details are in the finding section, based on anatomical location.    -The patient tolerated the procedure well and there were no immediate complications. The patient was then transferred to the recovery room in stable condition.    Findings:  Esophagus: LA-D esophagitis with esophageal ulcer, likely the source of bleeding. No esophageal varices.  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    RECOMMENDATIONS:    1. Full liq diet, ADAT  2. PPI X 24 hours, then q12h  3. Monitor H/H and vitals  4. Antiemetics  5. Ok to discharge home when stable H/H, tolerating diet and no pain.  6. Will need a screening colonoscopy as an outpatient      9/17/2019 11:35 AM Xavi Ochoa M.D.

## 2019-09-17 NOTE — DISCHARGE PLANNING
Anticipated Discharge Disposition: Home without skilled needs.    Action: Assessment done. Pt states her roommate or her mom will be her ride home on day of dc. Pt uses [facility] pharmacy on [address]. Pt has Access to HealthCare, but no prescription coverage. Pt states she is going to use [employer] card for meds if needed. Pt lives in [place] and is unemployed. Visitor at bedside, RN CM did not talk about etoh due to visitor's presence.    Barriers to Discharge: Medical clearance.    Plan: Continue to assist with any dc needs that may be identified. Revisit pt to discuss etoh abstinence.

## 2019-09-17 NOTE — H&P
Hospital Medicine History & Physical Note    Date of Service  9/17/2019    Primary Care Physician  Sadnra Frazier M.D.    Consultants  Gastroenterology - Dr Ochoa from [facility]    Code Status  Full code    Chief Complaint  Hematemesis    History of Presenting Illness  53 y.o. female who presented 9/17/2019 with hematemesis.    Patient has underlying history of underlying obesity status post gastric sleeve surgery, underlying history of alcoholism and daily consumption of at least 2-3 alcoholic drinks who presents to the hospital today complaining of melena, nausea and vomiting and hematemesis.  Patient reports that she started having melena, painless which started approximately 48 hours ago approximately on Sunday.  Sunday night she started having nausea and vomiting.  Monday morning she had a minimal episode of hematemesis, minimal amount of blood in her vomitus.  She continued to have diarrhea, ongoing persistent melena into today and this morning she had an episode of large amount of hematemesis, she reports this was very significant approximately 250 to 400 mL's of blood in her vomitus.  Subsequently presented to the emergency department for further evaluation.  Today she started having umbilical/central abdominal pain, intermittent, colicky in character, 5 out of 10 in intensity, nonradiating for which she did not notice any improving or worsening factors.  Diarrhea, melena, nausea and vomiting is associated factors.  No fever, chills.  No chest pain/shortness of breath.  Reports associated dizziness which developed today, no syncopal events.  Previous history of gastric sleeve surgery, which was done in [place].  History of hysterectomy and laparoscopic cholecystectomy.  She denies taking any NSAIDs, reports she takes Prilosec/omeprazole daily.  In the past she has never had an upper endoscopy, colonoscopy.  She is never had issues with GI bleeding before.  Otherwise, upon questioning she denies smoking, illicit  drugs of abuse but reports 2-3 alcoholic drinks daily, reports that if she does not drink daily she may develop shakes but has never had any alcohol withdrawal seizures.    Review of Systems  Review of Systems   Constitutional: Positive for diaphoresis and malaise/fatigue. Negative for chills and fever.   HENT: Negative for hearing loss and tinnitus.    Eyes: Negative for blurred vision and double vision.   Respiratory: Negative for cough, hemoptysis, shortness of breath and wheezing.    Cardiovascular: Negative for chest pain, palpitations and PND.   Gastrointestinal: Positive for abdominal pain, blood in stool, diarrhea, heartburn, nausea and vomiting. Negative for constipation and melena.   Genitourinary: Negative for dysuria, flank pain, frequency, hematuria and urgency.   Musculoskeletal: Negative for back pain, falls, joint pain, myalgias and neck pain.   Skin: Negative for itching and rash.   Neurological: Positive for dizziness. Negative for headaches.   Psychiatric/Behavioral: Positive for substance abuse (ALCOHOL). Negative for depression and suicidal ideas. The patient is nervous/anxious.        Past Medical History   has a past medical history of Anesthesia, Cancer (HCC), Disease, jaw, Heart burn (08/19/2019), Indigestion, and Unspecified urinary incontinence.    Surgical History   has a past surgical history that includes other orthopedic surgery; lumpectomy; other; bladder sling female (12/3/2014); other abdominal surgery (02/2018); tubal coagulation laparoscopic bilateral; hysterectomy laparoscopy (2000); and yvonne by laparoscopy (8/20/2019).     Family History  family history includes Cancer in her maternal grandmother and another family member; Diabetes in her father; Other in her father.     Social History   reports that she has never smoked. She has never used smokeless tobacco. She reports that she drinks alcohol. She reports that she does not use drugs.    Allergies  Allergies   Allergen  Reactions   • Vicodin [Hydrocodone-Acetaminophen] Itching       Medications  Prior to Admission Medications   Prescriptions Last Dose Informant Patient Reported? Taking?   BIOTIN PO 9/16/2019 at AM Patient Yes No   Sig: Take 1 Tab by mouth every day.   CALCIUM PO 9/16/2019 at AM Patient Yes Yes   Sig: Take 1 Tab by mouth every day.   Multiple Vitamins-Calcium (ONE-A-DAY WOMENS PO) 9/16/2019 at AM Patient Yes No   Sig: Take 1 Tab by mouth every day.   omeprazole (PRILOSEC) 20 MG delayed-release capsule 9/16/2019 at AM Patient No No   Sig: Take 1 Cap by mouth every day.      Facility-Administered Medications: None       Physical Exam  Temp:  [36.1 °C (97 °F)] 36.1 °C (97 °F)  Pulse:  [100-150] 100  Resp:  [18-27] 27  BP: (115-150)/() 115/67  SpO2:  [96 %-99 %] 96 %    Physical Exam   Constitutional: She is oriented to person, place, and time. She appears well-developed and well-nourished. No distress.   HENT:   Head: Normocephalic.   Mouth/Throat: No oropharyngeal exudate.   Slightly dry mucous membranes.   Eyes: Pupils are equal, round, and reactive to light. Conjunctivae are normal. No scleral icterus.   Neck: No JVD present.   Cardiovascular: Regular rhythm and normal heart sounds. Exam reveals no gallop and no friction rub.   No murmur heard.  Patient is tachycardic.  Peripheral pulses are 2+ in bilateral upper and lower extremity.  Good capillary refill is noted.   Pulmonary/Chest: Breath sounds normal. No stridor. No respiratory distress. She has no wheezes. She has no rales.   Abdominal: Soft. Bowel sounds are normal. She exhibits no distension. There is no tenderness. There is no rebound and no guarding.   Musculoskeletal: Normal range of motion. She exhibits no edema, tenderness or deformity.   Neurological: She is alert and oriented to person, place, and time. No cranial nerve deficit.   Skin: Skin is warm and dry. She is not diaphoretic.   Psychiatric: She has a normal mood and affect. Her behavior is  normal. Judgment and thought content normal.       Laboratory:  Recent Labs     09/17/19  0735   WBC 10.1   RBC 3.93*   HEMOGLOBIN 13.7   HEMATOCRIT 40.8   .8*   MCH 34.9*   MCHC 33.6   RDW 50.0   PLATELETCT 258   MPV 10.3     Recent Labs     09/17/19  0735   SODIUM 138   POTASSIUM 3.3*   CHLORIDE 96   CO2 27   GLUCOSE 146*   BUN 22   CREATININE 0.74   CALCIUM 9.9     Recent Labs     09/17/19  0735   ALTSGPT 77*   ASTSGOT 87*   ALKPHOSPHAT 67   TBILIRUBIN 2.2*   LIPASE 29   GLUCOSE 146*     Recent Labs     09/17/19  0735   APTT 26.3   INR 0.90     No results for input(s): NTPROBNP in the last 72 hours.      No results for input(s): TROPONINT in the last 72 hours.    Urinalysis:    No results found     Imaging:  DX-CHEST-PORTABLE (1 VIEW)   Final Result      No acute cardiopulmonary abnormality.      US-ABDOMEN COMPLETE SURVEY    (Results Pending)         Assessment/Plan:  I anticipate this patient will require at least two midnights for appropriate medical management, necessitating inpatient admission.    UGIB (upper gastrointestinal bleed)- (present on admission)  Assessment & Plan  Differential diagnosis considered at this time include: Jennifer-Flowers tear with subsequent bleeding, alcoholic gastritis with acute bleeding, esophageal variceal bleeding, peptic ulcer disease/others    At this time patient will be admitted to the telemetry unit  Ongoing IV fluid hydration/resuscitation will be performed  Type and screen obtained, obtain coagulation studies/TEG with platelet mapping  Maintain 2 large-bore IV access at all times  Protonix IV 80 mg followed by 8 mg/h Protonix drip  Monitor serial hemoglobins, every 6 hours ordered  Monitor Hb / Restrictive transfusion strategy    Gastroenterology consultation obtained, I have personally discussed the case with Dr Ochoa -he is evaluating the patient and plans are in place to proceed with upper endoscopy for further evaluation today, if negative patient may need a  colonoscopic evaluation tomorrow.    Alcoholic hepatitis without ascites- (present on admission)  Assessment & Plan  Mild, no indications for steroid use at this time  Monitor daily CMP  Patient counseled and educated regarding alcohol cessation  Gastroenterology team following, defer further recommendations to gastroenterology    Macrocytosis without anemia- (present on admission)  Assessment & Plan  Suspect alcoholism induced    Alcohol dependence with unspecified alcohol-induced disorder (HCC)- (present on admission)  Assessment & Plan  Patient reports 2-3 alcoholic drinks daily    She reports that intermittently she may have shakes if she stops drinking alcohol    At this time patient will be given IV thiamine, 500 mg x 1  We will initiate the patient on CIWA protocol, benzodiazepine supplementation per CIWA protocol  Multivitamin support  Monitor electrolytes and replace as clinically appropriate  As needed clonidine for SBP greater than 160    Aspiration, fall, seizure precautions    Hypokalemia- (present on admission)  Assessment & Plan  Secondary to lack of oral intake, nausea and vomiting    Lactated Ringer's with potassium supplementation ordered, interval chemistry panel for tomorrow morning requested      VTE prophylaxis: SCDs /subcutaneous Lovenox or heparin remains contraindicated with concerns for acute GI bleeding

## 2019-09-17 NOTE — ED TRIAGE NOTES
"Chief Complaint   Patient presents with   • Nausea/Vomiting/Diarrhea     sx started 3 days ago. Pt reports blood in vomit started yesterday, hasn't been able to eat anything for 2 days. Woke up this morning and \"filled the toilet bowl with blood\".   • Bloody Stools     Reports dark tarry stool for 3 days.      Pt arrives to triage shaky and diaphoretic with emesis bag in hand with bright red blood. Pt reports having to urgently use the bathroom. Wheeled back to bathroom and reporting dizziness; RN stayed with pt in bathroom and + dark stools.   /113   Pulse (!) 150   Temp 36.1 °C (97 °F) (Temporal)   Resp (!) 26   Ht 1.676 m (5' 6\")   Wt 88.9 kg (195 lb 15.8 oz)   SpO2 99%   BMI 31.63 kg/m²     Charge RN aware and pt wheeled back to room and report to [place].   Chart up for ERP.   "

## 2019-09-17 NOTE — PROGRESS NOTES
Patient transported to [facility]. Tele monitor on and monitor room notified. No complaints of pain. Patient complains of nausea. CIWA protocol in place. Aspiration and seizure precautions in place. Fall precautions in place. Call light and all belongings within reach. AOx4, 2L O2 NC, and post op vitals in place. No further needs at this time.

## 2019-09-17 NOTE — ASSESSMENT & PLAN NOTE
Patient reports 2-3 alcoholic drinks daily    She reports that intermittently she may have shakes if she stops drinking alcohol    At this time patient will be given IV thiamine, 500 mg x 1  We will initiate the patient on CIWA protocol, benzodiazepine supplementation per [facility] protocol  Multivitamin support  Monitor electrolytes and replace as clinically appropriate  As needed clonidine for SBP greater than 160    Aspiration, fall, seizure precautions

## 2019-09-17 NOTE — CONSULTS
Date of Consultation:  9/17/2019    Patient: : Angélica Beck  MRN: [id number]    Referring Physician: Kirk Brooks M.D.      GI:Rogers Kohli M.D.     Reason for Consultation:GI bleed     History of Present Illness: Angélica Beck is a 53 y.o. female with medical history of a sleeve gastrectomy in 2018 and cholecystectomy in Aug 2019 presented to the emergency department because of bloody vomiting and black dark stool.  Long history of GERD on omeprazole, history of reported hiatal hernia done during the sleeve gastrectomy about 1-1/2-year ago, was admitted to the hospital a month ago for cholecystectomy, patient noticed a fever of 103 for 3 days after the hospital discharge and then her temperature back to normal until 3 days ago when she started to have severe nausea and vomiting associated with a temperature of 101.  Next day patient started to notice her stool is black and started to have diarrhea and pinkish blood with the vomiting, also she is complaining of epigastric colicky abdominal pain and she is not able to keep anything down and cannot eat because of her nausea.  Patient contacted her surgeon office and she was advised to come in to the emergency department because of the black stool.        Otherwise the patient is doing fine without complaints of fever/ chills/ dysphagia/ odynophagia/ constipation.    Past Medical History:   Diagnosis Date   • Heart burn 08/19/2019   • Anesthesia     PONV   • Cancer (HCC)     basal cell chest   • Disease, jaw     BONE INFECTION   • Indigestion    • Unspecified urinary incontinence          Past Surgical History:   Procedure Laterality Date   • ANTONIO BY LAPAROSCOPY  8/20/2019    Procedure: CHOLECYSTECTOMY, LAPAROSCOPIC;  Surgeon: Lior Olivo M.D.;  Location: SURGERY SAME DAY [facility];  Service: General   • OTHER ABDOMINAL SURGERY  02/2018    gastric sleeve   • BLADDER SLING FEMALE  12/3/2014    Performed by Darell Ferris M.D. at SURGERY  [facility]   • HYSTERECTOMY LAPAROSCOPY  2000    fibroidsand endometriosis   • LUMPECTOMY      breast reduction   • OTHER      BREAST REDUCTION. with implants   • OTHER ORTHOPEDIC SURGERY      shoulder arth   • TUBAL COAGULATION LAPAROSCOPIC BILATERAL         Family History   Problem Relation Age of Onset   • Diabetes Father    • Other Father         parkinsons   • Cancer Other         ovarian   • Cancer Maternal Grandmother         ovarian       Social History     Socioeconomic History   • Marital status: Single     Spouse name: Not on file   • Number of children: Not on file   • Years of education: Not on file   • Highest education level: Not on file   Occupational History   • Not on file   Social Needs   • Financial resource strain: Not on file   • Food insecurity:     Worry: Not on file     Inability: Not on file   • Transportation needs:     Medical: Not on file     Non-medical: Not on file   Tobacco Use   • Smoking status: Never Smoker   • Smokeless tobacco: Never Used   Substance and Sexual Activity   • Alcohol use: Yes     Frequency: 2-3 times a week     Drinks per session: 1 or 2     Comment: few times a week   • Drug use: No   • Sexual activity: Not on file   Lifestyle   • Physical activity:     Days per week: Not on file     Minutes per session: Not on file   • Stress: Not on file   Relationships   • Social connections:     Talks on phone: Not on file     Gets together: Not on file     Attends Jain service: Not on file     Active member of club or organization: Not on file     Attends meetings of clubs or organizations: Not on file     Relationship status: Not on file   • Intimate partner violence:     Fear of current or ex partner: Not on file     Emotionally abused: Not on file     Physically abused: Not on file     Forced sexual activity: Not on file   Other Topics Concern   • Not on file   Social History Narrative   • Not on file       Review of Systems   Constitutional: Positive for chills  "and fever. Negative for malaise/fatigue and weight loss.   Respiratory: Negative for cough and hemoptysis.    Cardiovascular: Negative for chest pain, palpitations and leg swelling.   Gastrointestinal: Positive for abdominal pain, diarrhea, heartburn, melena, nausea and vomiting. Negative for blood in stool and constipation.   Genitourinary: Negative for dysuria.   Musculoskeletal: Negative for falls.   Skin: Negative for itching.   Neurological: Negative for seizures and loss of consciousness.         Physical Exam:  Vitals:    09/17/19 0709 09/17/19 0718 09/17/19 0720   BP: 150/113     Pulse: (Abnormal) 145  (Abnormal) 150   Resp: 18  (Abnormal) 26   Temp: 36.1 °C (97 °F)     TempSrc: Temporal     SpO2: 99%  99%   Weight:  88.9 kg (195 lb 15.8 oz)    Height: 1.676 m (5' 6\")         Physical Exam   Constitutional: She is oriented to person, place, and time. No distress.   HENT:   Head: Normocephalic and atraumatic.   Cardiovascular: Exam reveals no gallop.   No murmur heard.  Pulmonary/Chest: Effort normal. No respiratory distress. She has no wheezes.   Abdominal: Soft. She exhibits no distension. There is tenderness. There is no rebound and no guarding.   Musculoskeletal: She exhibits no edema.   Neurological: She is alert and oriented to person, place, and time.   Skin: She is not diaphoretic.         Labs:  Recent Labs     09/17/19  0735   WBC 10.1   RBC 3.93*   HEMOGLOBIN 13.7   HEMATOCRIT 40.8   .8*   MCH 34.9*   MCHC 33.6   RDW 50.0   PLATELETCT 258   MPV 10.3     Recent Labs     09/17/19  0735   SODIUM 138   POTASSIUM 3.3*   CHLORIDE 96   CO2 27   GLUCOSE 146*   BUN 22     Recent Labs     09/17/19  0735   APTT 26.3   INR 0.90         Imaging:  DX-CHEST-PORTABLE (1 VIEW)   Final Result      No acute cardiopulmonary abnormality.                Impressions:  1.  Nausea vomiting and diarrhea.  2.  Hematemesis.  3.  Melena.  4.  Status post cholecystectomy.  5.  Status post sleeve gastrectomy.  6.  " Fever.  7.  Macrocytosis.  8.  Lack of screening colonoscopy    53 years old female with past medical history of cholecystectomy, gastroesophageal reflux disease status post sleeve gastrectomy and hiatal hernia repair presented with 3 days history of nausea, vomiting, hematemesis, diarrhea, melena and fever.  On the ED patient was tachycardic, received IV fluid, hemoglobin was 13.7, BUN 22, creatinine 0.74, transaminitis with total bilirubin of 2.2.  Differential diagnosis include gastric ulcer, duodenal ulcer, Jennifer-Flowers tear, diverticulosis, diverticulitis and complications from cholecystectomy.    Recommendations:  1.  Continue trending hemoglobin every 8 hours.  2.  IV PPI.  3.  Abdominal US.  4.  Zofran IV as needed.  5.  n.p.o.  6.  EGD today.  7.  Check ferritin, zinc, vitamin A, B1, B12, folate, vitamin D, blood alcohol level.    Risks, benefits, and alternatives were discussed with patient. Consenting persons were given an opportunity to ask questions and discuss other options. Risks including but not limited to failed or incomplete endoscopy, ineffective therapy, perforation, infection, bleeding, missed lesion(s), cardiac and/or pulmonary event, aspiration, stroke, possible need for surgery, hospitalization possibly prolonged, discomfort, unsuccessful and/or incomplete procedure, possible need for repeat procedures and/or additional testings, damage to adjacent organs and/or vascular structures, medication reaction, disability, death, and other adverse events possibly life-threatening. Discussion was undertaken with Layman's terms. Consenting persons stated understanding and acceptance of these risks, and wished to proceed. Consent was given in clear state of mind. All questions were answered.      This note was generated using voice recognition software which has a small chance of producing errors of grammar and possibly content. I have made every reasonable attempt to find and correct any obvious  errors, but expect that some may not be found prior to finalization of this note.    ===  I have seen and examined the patient today.  I have reviewed the medical record, laboratory data, imaging, and all relevant studies.  I have discussed the assessment and plan of care with the Internal Medicine Resident and agree with their note and plan of care as documented.   Xavi Ochoa M.D.  ===

## 2019-09-17 NOTE — ASSESSMENT & PLAN NOTE
Mild, no indications for steroid use at this time  Monitor daily CMP  Patient counseled and educated regarding alcohol cessation  Gastroenterology team following, defer further recommendations to gastroenterology

## 2019-09-17 NOTE — ED PROVIDER NOTES
"ED Provider Note    CHIEF COMPLAINT  Chief Complaint   Patient presents with   • Nausea/Vomiting/Diarrhea     sx started 3 days ago. Pt reports blood in vomit started yesterday, hasn't been able to eat anything for 2 days. Woke up this morning and \"filled the toilet bowl with blood\".   • Bloody Stools     Reports dark tarry stool for 3 days.        HPI  Angélica Beck is a 53 y.o. female who presents for evaluation of vomiting blood.  2 days ago the patient states she started having diarrhea that was dark in color.  Yesterday she had persistent diarrhea also started having vomiting which was blood-tinged.  She awoke this morning and states that she filled the toilet with bright red blood with vomiting.  She continues to have black diarrhea.  She is tachycardic on arrival.  She denies aspirin, blood thinners, nonsteroidal use.  She has no history of previous GI bleed.  She does have a history of gastric sleeve placement 1 year ago in [place].  She is having some mild mid abdominal pain.  She is status post hysterectomy and recent laparoscopic cholecystectomy.    REVIEW OF SYSTEMS  See HPI for further details. All other systems negative.    PAST MEDICAL HISTORY  Past Medical History:   Diagnosis Date   • Anesthesia     PONV   • Cancer (HCC)     basal cell chest   • Disease, jaw     BONE INFECTION   • Heart burn 08/19/2019   • Indigestion    • Unspecified urinary incontinence        FAMILY HISTORY  Family History   Problem Relation Age of Onset   • Diabetes Father    • Other Father         parkinsons   • Cancer Other         ovarian   • Cancer Maternal Grandmother         ovarian       SOCIAL HISTORY  Social History     Socioeconomic History   • Marital status: Single     Spouse name: Not on file   • Number of children: Not on file   • Years of education: Not on file   • Highest education level: Not on file   Occupational History   • Not on file   Social Needs   • Financial resource strain: Not on file   • Food " insecurity:     Worry: Not on file     Inability: Not on file   • Transportation needs:     Medical: Not on file     Non-medical: Not on file   Tobacco Use   • Smoking status: Never Smoker   • Smokeless tobacco: Never Used   Substance and Sexual Activity   • Alcohol use: Yes     Frequency: 2-3 times a week     Drinks per session: 1 or 2     Comment: few times a week   • Drug use: No   • Sexual activity: Not on file   Lifestyle   • Physical activity:     Days per week: Not on file     Minutes per session: Not on file   • Stress: Not on file   Relationships   • Social connections:     Talks on phone: Not on file     Gets together: Not on file     Attends Mandaen service: Not on file     Active member of club or organization: Not on file     Attends meetings of clubs or organizations: Not on file     Relationship status: Not on file   • Intimate partner violence:     Fear of current or ex partner: Not on file     Emotionally abused: Not on file     Physically abused: Not on file     Forced sexual activity: Not on file   Other Topics Concern   • Not on file   Social History Narrative   • Not on file       SURGICAL HISTORY  Past Surgical History:   Procedure Laterality Date   • ANTONIO BY LAPAROSCOPY  8/20/2019    Procedure: CHOLECYSTECTOMY, LAPAROSCOPIC;  Surgeon: Lior Olivo M.D.;  [facility]: SURGERY [facility] DAY [facility];  Service: General   • OTHER ABDOMINAL SURGERY  02/2018    gastric sleeve   • BLADDER SLING FEMALE  12/3/2014    Performed by Darell Ferris M.D. at SURGERY [facility]   • HYSTERECTOMY LAPAROSCOPY  2000    fibroidsand endometriosis   • LUMPECTOMY      breast reduction   • OTHER      BREAST REDUCTION. with implants   • OTHER ORTHOPEDIC SURGERY      shoulder arth   • TUBAL COAGULATION LAPAROSCOPIC BILATERAL         CURRENT MEDICATIONS  Home Medications     Reviewed by Jackelyn Nichols R.N. (Registered Nurse) on 09/17/19 at 0733  Med List Status: Partial   Medication Last Dose Status   BIOTIN PO   "Active   Multiple Vitamins-Calcium (ONE-A-DAY WOMENS PO)  Active   omeprazole (PRILOSEC) 20 MG delayed-release capsule  Active                ALLERGIES  Allergies   Allergen Reactions   • Vicodin [Hydrocodone-Acetaminophen] Itching       PHYSICAL EXAM  VITAL SIGNS: /113   Pulse (!) 150   Temp 36.1 °C (97 °F) (Temporal)   Resp (!) 26   Ht 1.676 m (5' 6\")   Wt 88.9 kg (195 lb 15.8 oz)   SpO2 99%   BMI 31.63 kg/m²   Constitutional: Well developed, Well nourished, No acute distress, Non-toxic appearance.   HENT: Normocephalic, Atraumatic.  Eyes:  EOMI, Conjunctiva normal, No discharge.   Cardiovascular: Tachycardic, Normal rhythm, No murmurs, No rubs, No gallops.   Thorax & Lungs: Clear to auscultation without wheezes, rales, or rhonchi.  Abdomen: Soft, minimal periumbilical tenderness without guarding or rebound.  No masses.    Skin: Warm, Dry.  Rectal: Normal sphincter tone.  No hemorrhoids.  Black heme positive stool.  Musculoskeletal: Good range of motion in all major joints.  Neurologic: Awake and alert, No focal deficits noted.           COURSE & MEDICAL DECISION MAKING  Pertinent Labs & Imaging studies reviewed. (See chart for details)  This is a 53-year-old here for evaluation of vomiting of bright red blood.  She is tachycardic on arrival.  On exam she does have black heme positive stool.  An IV is established and laboratories are obtained.  These include chemistries which are normal with the exception of a glucose of 146 and the potassium being slightly low at 3.3.  BUN is actually normal.  AST of 87 and ALT of 77 with a total bilirubin of 2.2.  Lipase is normal.  CBC shows normal white count with a normal hemoglobin of 13.7.  MCV is 103.8.  INR is normal.  Patient is hydrated with normal saline due to her tachycardia and reported blood loss.  She is also treated with Pepcid IV.  I discussed the case with Dr. Ewing of gastroenterology and he will be in to consult on the case.  I will discussed the " case with the hospitalist and they will be the primary admitting physicians.    FINAL IMPRESSION  1.  Acute upper GI bleed  2.  Sinus tachycardia  3.  Hypokalemia  4.  Elevated liver function studies  5.  Conscious sedation per procedure note below         Electronically signed by: Kirk Brooks, 9/17/2019 8:47 AM      Conscious Sedation Procedure Note    Indication: procedural pain management for endoscopy at GIs request    Consent: I have discussed with the patient and/or the patient representative the indication, alternatives, and the possible risks and/or complications of the planned procedure and the anesthesia methods. The patient and/or patient representative appear to understand and agree to proceed.    Physician Involvement: The attending physician was present and supervising this procedure.    Pre-Sedation Documentation and Exam: I have personally completed a history, physical exam & review of systems for this patient (see notes).  Airway Assessment: normal  f3  Prior History of Anesthesia Complications: none    ASA Classification: Class 1 - A normal healthy patient    Sedation/ Anesthesia Plan: intravenous sedation    Medications Used: propofol intravenously    Monitoring and Safety: The patient was placed on a cardiac monitor and vital signs, pulse oximetry and level of consciousness were continuously evaluated throughout the procedure. The patient was closely monitored until recovery from the medications was complete and the patient had returned to baseline status. Respiratory therapy was on standby at all times during the procedure.      (The following sections must be completed)  Post-Sedation Vital Signs: Vital signs were reviewed and were stable after the procedure (see flow sheet for vitals)            Intraservice Time: 10 minutes    Post-Sedation Exam: Patient is awake and alert with a normal respiratory exam and persistent tachycardia.           Complications: none    I provided both the  sedation and procedure, a nurse was present at the bedside for the entire procedure.

## 2019-09-17 NOTE — ASSESSMENT & PLAN NOTE
Differential diagnosis considered at this time include: Jennifer-Flowers tear with subsequent bleeding, alcoholic gastritis with acute bleeding, esophageal variceal bleeding, peptic ulcer disease/others    At this time patient will be admitted to the telemetry unit  Ongoing IV fluid hydration/resuscitation will be performed  Type and screen obtained, obtain coagulation studies/TEG with platelet mapping  Maintain 2 large-bore IV access at all times  Protonix IV 80 mg followed by 8 mg/h Protonix drip  Monitor serial hemoglobins, every 6 hours ordered  Monitor Hb / Restrictive transfusion strategy    Gastroenterology consultation obtained, I have personally discussed the case with Dr Ochoa -he is evaluating the patient and plans are in place to proceed with upper endoscopy for further evaluation today, if negative patient may need a colonoscopic evaluation tomorrow.

## 2019-09-17 NOTE — DISCHARGE PLANNING
Care Transition Team Assessment    Information Source  Orientation : Oriented x 4  Information Given By: Patient  Who is responsible for making decisions for patient? : Patient         Elopement Risk  Legal Hold: No  Elopement Risk: Not at Risk for Elopement    Interdisciplinary Discharge Planning  Does Admitting Nurse Feel This Could be a Complex Discharge?: No  Lives with - Patient's Self Care Capacity: Significant Other  Do You Take your Prescribed Medications Regularly: Yes  Able to Return to Previous ADL's: Yes  Mobility Issues: No  Prior Services: None  Patient Expects to be Discharged to:: (home)  Assistance Needed: No  Durable Medical Equipment: Not Applicable    Discharge Preparedness  What is your plan after discharge?: Home with help  What are your discharge supports?: Partner  Prior Functional Level: Independent with Activities of Daily Living  Difficulity with ADLs: None  Difficulity with IADLs: None    Functional Assesment  Prior Functional Level: Independent with Activities of Daily Living    Finances  Financial Barriers to Discharge: Yes  Average Monthly Income: (unemployed)  Prescription Coverage: No  Prescription Coverage Comments: (is going to use [employer])    Vision / Hearing Impairment  Right Eye Vision: Impaired, Wears Glasses  Left Eye Vision: Impaired, Wears Glasses         Advance Directive  Advance Directive?: None    Domestic Abuse  Have you ever been the victim of abuse or violence?: No    Psychological Assessment  History of Substance Abuse: Alcohol  Date Last Used - Alcohol: 9/16/19    Discharge Risks or Barriers  Discharge risks or barriers?: Uninsured / underinsured, Substance abuse  Patient risk factors: Substance abuse

## 2019-09-18 ENCOUNTER — APPOINTMENT (OUTPATIENT)
Dept: RADIOLOGY | Facility: MEDICAL CENTER | Age: 54
DRG: 381 | End: 2019-09-18
Attending: STUDENT IN AN ORGANIZED HEALTH CARE EDUCATION/TRAINING PROGRAM
Payer: COMMERCIAL

## 2019-09-18 ENCOUNTER — PATIENT OUTREACH (OUTPATIENT)
Dept: HEALTH INFORMATION MANAGEMENT | Facility: OTHER | Age: 54
End: 2019-09-18

## 2019-09-18 VITALS
HEART RATE: 87 BPM | SYSTOLIC BLOOD PRESSURE: 116 MMHG | DIASTOLIC BLOOD PRESSURE: 76 MMHG | WEIGHT: 200.84 LBS | RESPIRATION RATE: 18 BRPM | BODY MASS INDEX: 32.28 KG/M2 | TEMPERATURE: 98.2 F | HEIGHT: 66 IN | OXYGEN SATURATION: 94 %

## 2019-09-18 LAB
AFP-TM SERPL-MCNC: 3 NG/ML (ref 0–9)
ALBUMIN SERPL BCP-MCNC: 3.2 G/DL (ref 3.2–4.9)
ALBUMIN/GLOB SERPL: 1.2 G/DL
ALP SERPL-CCNC: 50 U/L (ref 30–99)
ALT SERPL-CCNC: 51 U/L (ref 2–50)
ANION GAP SERPL CALC-SCNC: 7 MMOL/L (ref 0–11.9)
AST SERPL-CCNC: 58 U/L (ref 12–45)
BASOPHILS # BLD AUTO: 0.5 % (ref 0–1.8)
BASOPHILS # BLD: 0.03 K/UL (ref 0–0.12)
BILIRUB SERPL-MCNC: 1.7 MG/DL (ref 0.1–1.5)
BUN SERPL-MCNC: 17 MG/DL (ref 8–22)
CALCIUM SERPL-MCNC: 8.6 MG/DL (ref 8.5–10.5)
CFT BLD TEG: 5.1 MIN (ref 5–10)
CHLORIDE SERPL-SCNC: 102 MMOL/L (ref 96–112)
CLOT ANGLE BLD TEG: 64.3 DEGREES (ref 53–72)
CLOT LYSIS 30M P MA LENFR BLD TEG: 0.7 % (ref 0–8)
CO2 SERPL-SCNC: 28 MMOL/L (ref 20–33)
CREAT SERPL-MCNC: 0.68 MG/DL (ref 0.5–1.4)
CT.EXTRINSIC BLD ROTEM: 1.9 MIN (ref 1–3)
EOSINOPHIL # BLD AUTO: 0.16 K/UL (ref 0–0.51)
EOSINOPHIL NFR BLD: 2.9 % (ref 0–6.9)
ERYTHROCYTE [DISTWIDTH] IN BLOOD BY AUTOMATED COUNT: 51.2 FL (ref 35.9–50)
GLOBULIN SER CALC-MCNC: 2.6 G/DL (ref 1.9–3.5)
GLUCOSE SERPL-MCNC: 91 MG/DL (ref 65–99)
HCT VFR BLD AUTO: 31.9 % (ref 37–47)
HCT VFR BLD AUTO: 32.4 % (ref 37–47)
HCT VFR BLD AUTO: 33.2 % (ref 37–47)
HGB BLD-MCNC: 10.7 G/DL (ref 12–16)
HGB BLD-MCNC: 10.7 G/DL (ref 12–16)
HGB BLD-MCNC: 11 G/DL (ref 12–16)
IMM GRANULOCYTES # BLD AUTO: 0.01 K/UL (ref 0–0.11)
IMM GRANULOCYTES NFR BLD AUTO: 0.2 % (ref 0–0.9)
LYMPHOCYTES # BLD AUTO: 1.56 K/UL (ref 1–4.8)
LYMPHOCYTES NFR BLD: 28.5 % (ref 22–41)
MAGNESIUM SERPL-MCNC: 1.2 MG/DL (ref 1.5–2.5)
MAGNESIUM SERPL-MCNC: 2.2 MG/DL (ref 1.5–2.5)
MCF BLD TEG: 62.4 MM (ref 50–70)
MCH RBC QN AUTO: 36.1 PG (ref 27–33)
MCHC RBC AUTO-ENTMCNC: 34 G/DL (ref 33.6–35)
MCV RBC AUTO: 106.2 FL (ref 81.4–97.8)
MONOCYTES # BLD AUTO: 0.45 K/UL (ref 0–0.85)
MONOCYTES NFR BLD AUTO: 8.2 % (ref 0–13.4)
NEUTROPHILS # BLD AUTO: 3.27 K/UL (ref 2–7.15)
NEUTROPHILS NFR BLD: 59.7 % (ref 44–72)
NRBC # BLD AUTO: 0 K/UL
NRBC BLD-RTO: 0 /100 WBC
PA AA BLD-ACNC: 76.4 %
PA ADP BLD-ACNC: 31.4 %
PHOSPHATE SERPL-MCNC: 2.9 MG/DL (ref 2.5–4.5)
PLATELET # BLD AUTO: 159 K/UL (ref 164–446)
PMV BLD AUTO: 10.3 FL (ref 9–12.9)
POTASSIUM SERPL-SCNC: 3.2 MMOL/L (ref 3.6–5.5)
PROT SERPL-MCNC: 5.8 G/DL (ref 6–8.2)
RBC # BLD AUTO: 3.05 M/UL (ref 4.2–5.4)
SODIUM SERPL-SCNC: 137 MMOL/L (ref 135–145)
TEG ALGORITHM TGALG: NORMAL
WBC # BLD AUTO: 5.5 K/UL (ref 4.8–10.8)

## 2019-09-18 PROCEDURE — 83735 ASSAY OF MAGNESIUM: CPT

## 2019-09-18 PROCEDURE — 85018 HEMOGLOBIN: CPT | Mod: 91

## 2019-09-18 PROCEDURE — 700102 HCHG RX REV CODE 250 W/ 637 OVERRIDE(OP): Performed by: INTERNAL MEDICINE

## 2019-09-18 PROCEDURE — 700105 HCHG RX REV CODE 258: Performed by: INTERNAL MEDICINE

## 2019-09-18 PROCEDURE — C9113 INJ PANTOPRAZOLE SODIUM, VIA: HCPCS | Performed by: INTERNAL MEDICINE

## 2019-09-18 PROCEDURE — A9270 NON-COVERED ITEM OR SERVICE: HCPCS | Performed by: INTERNAL MEDICINE

## 2019-09-18 PROCEDURE — 85014 HEMATOCRIT: CPT

## 2019-09-18 PROCEDURE — 700111 HCHG RX REV CODE 636 W/ 250 OVERRIDE (IP): Performed by: INTERNAL MEDICINE

## 2019-09-18 PROCEDURE — 99239 HOSP IP/OBS DSCHRG MGMT >30: CPT | Performed by: INTERNAL MEDICINE

## 2019-09-18 PROCEDURE — 36415 COLL VENOUS BLD VENIPUNCTURE: CPT

## 2019-09-18 PROCEDURE — 76700 US EXAM ABDOM COMPLETE: CPT

## 2019-09-18 RX ORDER — MAGNESIUM SULFATE HEPTAHYDRATE 40 MG/ML
4 INJECTION, SOLUTION INTRAVENOUS ONCE
Status: DISCONTINUED | OUTPATIENT
Start: 2019-09-18 | End: 2019-09-18

## 2019-09-18 RX ORDER — POTASSIUM CHLORIDE 20 MEQ/1
40 TABLET, EXTENDED RELEASE ORAL 2 TIMES DAILY
Status: DISCONTINUED | OUTPATIENT
Start: 2019-09-18 | End: 2019-09-18 | Stop reason: HOSPADM

## 2019-09-18 RX ORDER — SUCRALFATE ORAL 1 G/10ML
1 SUSPENSION ORAL EVERY 6 HOURS
Qty: 1200 ML | Refills: 2 | Status: SHIPPED | OUTPATIENT
Start: 2019-09-18 | End: 2019-09-19

## 2019-09-18 RX ORDER — MAGNESIUM SULFATE HEPTAHYDRATE 40 MG/ML
2 INJECTION, SOLUTION INTRAVENOUS ONCE
Status: DISCONTINUED | OUTPATIENT
Start: 2019-09-18 | End: 2019-09-18

## 2019-09-18 RX ORDER — LANOLIN ALCOHOL/MO/W.PET/CERES
100 CREAM (GRAM) TOPICAL DAILY
Qty: 30 TAB | Refills: 0 | Status: SHIPPED | OUTPATIENT
Start: 2019-09-19 | End: 2020-01-22

## 2019-09-18 RX ORDER — MAGNESIUM SULFATE HEPTAHYDRATE 40 MG/ML
4 INJECTION, SOLUTION INTRAVENOUS ONCE
Status: COMPLETED | OUTPATIENT
Start: 2019-09-18 | End: 2019-09-18

## 2019-09-18 RX ORDER — FOLIC ACID 1 MG/1
1 TABLET ORAL DAILY
Qty: 30 TAB | Refills: 0 | Status: SHIPPED | OUTPATIENT
Start: 2019-09-19 | End: 2020-01-22

## 2019-09-18 RX ORDER — OMEPRAZOLE 20 MG/1
20 CAPSULE, DELAYED RELEASE ORAL 2 TIMES DAILY
Status: DISCONTINUED | OUTPATIENT
Start: 2019-09-18 | End: 2019-09-18 | Stop reason: HOSPADM

## 2019-09-18 RX ORDER — OMEPRAZOLE 20 MG/1
20 CAPSULE, DELAYED RELEASE ORAL 2 TIMES DAILY
Qty: 60 CAP | Refills: 2 | Status: SHIPPED | OUTPATIENT
Start: 2019-09-18 | End: 2019-10-18

## 2019-09-18 RX ADMIN — Medication 400 MG: at 11:12

## 2019-09-18 RX ADMIN — Medication 100 MG: at 05:17

## 2019-09-18 RX ADMIN — POTASSIUM CHLORIDE: 149 INJECTION, SOLUTION, CONCENTRATE INTRAVENOUS at 08:43

## 2019-09-18 RX ADMIN — THERA TABS 1 TABLET: TAB at 05:17

## 2019-09-18 RX ADMIN — ACETAMINOPHEN 650 MG: 325 TABLET, FILM COATED ORAL at 08:39

## 2019-09-18 RX ADMIN — MAGNESIUM SULFATE IN WATER 4 G: 40 INJECTION, SOLUTION INTRAVENOUS at 03:54

## 2019-09-18 RX ADMIN — SUCRALFATE 1 G: 1 SUSPENSION ORAL at 05:17

## 2019-09-18 RX ADMIN — FOLIC ACID 1 MG: 1 TABLET ORAL at 05:17

## 2019-09-18 RX ADMIN — SODIUM CHLORIDE 8 MG/HR: 9 INJECTION, SOLUTION INTRAVENOUS at 08:43

## 2019-09-18 RX ADMIN — POTASSIUM CHLORIDE 40 MEQ: 1500 TABLET, EXTENDED RELEASE ORAL at 11:12

## 2019-09-18 RX ADMIN — SUCRALFATE 1 G: 1 SUSPENSION ORAL at 11:12

## 2019-09-18 ASSESSMENT — LIFESTYLE VARIABLES
VISUAL DISTURBANCES: NOT PRESENT
PAROXYSMAL SWEATS: *
TOTAL SCORE: 2
AUDITORY DISTURBANCES: NOT PRESENT
AUDITORY DISTURBANCES: NOT PRESENT
HEADACHE, FULLNESS IN HEAD: NOT PRESENT
ANXIETY: NO ANXIETY (AT EASE)
TREMOR: NO TREMOR
ORIENTATION AND CLOUDING OF SENSORIUM: ORIENTED AND CAN DO SERIAL ADDITIONS
VISUAL DISTURBANCES: NOT PRESENT
NAUSEA AND VOMITING: MILD NAUSEA WITH NO VOMITING
AGITATION: NORMAL ACTIVITY
TREMOR: NO TREMOR
HEADACHE, FULLNESS IN HEAD: NOT PRESENT
VISUAL DISTURBANCES: NOT PRESENT
TREMOR: NO TREMOR
AGITATION: NORMAL ACTIVITY
ANXIETY: NO ANXIETY (AT EASE)
NAUSEA AND VOMITING: MILD NAUSEA WITH NO VOMITING
AGITATION: NORMAL ACTIVITY
TOTAL SCORE: 2
PAROXYSMAL SWEATS: BARELY PERCEPTIBLE SWEATING. PALMS MOIST
ORIENTATION AND CLOUDING OF SENSORIUM: ORIENTED AND CAN DO SERIAL ADDITIONS
NAUSEA AND VOMITING: *
ANXIETY: NO ANXIETY (AT EASE)
HEADACHE, FULLNESS IN HEAD: NOT PRESENT
AUDITORY DISTURBANCES: NOT PRESENT
PAROXYSMAL SWEATS: NO SWEAT VISIBLE
TOTAL SCORE: 3
ORIENTATION AND CLOUDING OF SENSORIUM: ORIENTED AND CAN DO SERIAL ADDITIONS

## 2019-09-18 ASSESSMENT — ENCOUNTER SYMPTOMS
HEMOPTYSIS: 0
FALLS: 0
WEIGHT LOSS: 0
BLOOD IN STOOL: 0
COUGH: 0
PALPITATIONS: 0
SEIZURES: 0
DIARRHEA: 1
ABDOMINAL PAIN: 1
CONSTIPATION: 0
LOSS OF CONSCIOUSNESS: 0
NAUSEA: 1
VOMITING: 1
FEVER: 1
HEARTBURN: 1
CHILLS: 1

## 2019-09-18 NOTE — PROGRESS NOTES
Date of Progress note:  9/18/2019     Patient: : Angélica Beck  MRN: [id number]    Referring Physician: Kirk Brooks M.D.      GI:Xavi Ochoa M.D.     Reason for Consultation:GI bleed     History of Present Illness: Angélica Beck is a 53 y.o. female with medical history of a sleeve gastrectomy in 2018 and cholecystectomy in Aug 2019 presented to the emergency department because of bloody vomiting and black dark stool.  Long history of GERD on omeprazole, history of reported hiatal hernia done during the sleeve gastrectomy about 1-1/2-year ago, was admitted to the hospital a month ago for cholecystectomy, patient noticed a fever of 103 for 3 days after the hospital discharge and then her temperature back to normal until 3 days ago when she started to have severe nausea and vomiting associated with a temperature of 101.  Next day patient started to notice her stool is black and started to have diarrhea and pinkish blood with the vomiting, also she is complaining of epigastric colicky abdominal pain and she is not able to keep anything down and cannot eat because of her nausea.  Patient contacted her surgeon office and she was advised to come in to the emergency department because of the black stool.    Otherwise the patient is doing fine without complaints of fever/ chills/ dysphagia/ odynophagia/ constipation.  ===  9/17/2019 EGD:   Esophagus: LA-D esophagitis with esophageal ulcer, likely the source of bleeding. No esophageal varices.  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     9/18/2019 Doing better. Discussed about EtOH usage. Avoid eating within 3 hours prior to bedtime. Consider Wedge pillow. Take PPI daily AC.       Past Medical History:   Diagnosis Date   • Heart burn 08/19/2019   • Anesthesia     PONV   • Cancer (HCC)     basal cell chest   • Disease, jaw     BONE  INFECTION   • Indigestion    • Unspecified urinary incontinence          Past Surgical History:   Procedure Laterality Date   • GASTROSCOPY-ENDO N/A 9/17/2019    Procedure: GASTROSCOPY;  Surgeon: Xavi Ochoa M.D.;  Location: ENDOSCOPY [facility];  Service: Gastroenterology   • ANTONIO BY LAPAROSCOPY  8/20/2019    Procedure: CHOLECYSTECTOMY, LAPAROSCOPIC;  Surgeon: Lior Olivo M.D.;  Location: SURGERY SAME DAY [facility];  Service: General   • OTHER ABDOMINAL SURGERY  02/2018    gastric sleeve   • BLADDER SLING FEMALE  12/3/2014    Performed by Darell Ferris M.D. at SURGERY [facility]   • HYSTERECTOMY LAPAROSCOPY  2000    fibroidsand endometriosis   • LUMPECTOMY      breast reduction   • OTHER      BREAST REDUCTION. with implants   • OTHER ORTHOPEDIC SURGERY      shoulder arth   • TUBAL COAGULATION LAPAROSCOPIC BILATERAL         Family History   Problem Relation Age of Onset   • Diabetes Father    • Other Father         parkinsons   • Cancer Other         ovarian   • Cancer Maternal Grandmother         ovarian       Social History     Socioeconomic History   • Marital status: Single     Spouse name: Not on file   • Number of children: Not on file   • Years of education: Not on file   • Highest education level: Not on file   Occupational History   • Not on file   Social Needs   • Financial resource strain: Not on file   • Food insecurity:     Worry: Not on file     Inability: Not on file   • Transportation needs:     Medical: Not on file     Non-medical: Not on file   Tobacco Use   • Smoking status: Never Smoker   • Smokeless tobacco: Never Used   Substance and Sexual Activity   • Alcohol use: Yes     Frequency: 2-3 times a week     Drinks per session: 1 or 2     Comment: few times a week   • Drug use: No   • Sexual activity: Not on file   Lifestyle   • Physical activity:     Days per week: Not on file     Minutes per session: Not on file   • Stress: Not on file   Relationships   • Social  connections:     Talks on phone: Not on file     Gets together: Not on file     Attends Mu-ism service: Not on file     Active member of club or organization: Not on file     Attends meetings of clubs or organizations: Not on file     Relationship status: Not on file   • Intimate partner violence:     Fear of current or ex partner: Not on file     Emotionally abused: Not on file     Physically abused: Not on file     Forced sexual activity: Not on file   Other Topics Concern   • Not on file   Social History Narrative   • Not on file       Review of Systems   Constitutional: Positive for chills and fever. Negative for malaise/fatigue and weight loss.   Respiratory: Negative for cough and hemoptysis.    Cardiovascular: Negative for chest pain, palpitations and leg swelling.   Gastrointestinal: Positive for abdominal pain, diarrhea, heartburn, melena, nausea and vomiting. Negative for blood in stool and constipation.   Genitourinary: Negative for dysuria.   Musculoskeletal: Negative for falls.   Skin: Negative for itching.   Neurological: Negative for seizures and loss of consciousness.         Physical Exam:  Vitals:    09/17/19 2000 09/18/19 0000 09/18/19 0400 09/18/19 0800   BP: 119/73 137/89 111/71 116/89   Pulse: (!) 102 99 88 89   Resp: 16 16 16 18   Temp: 37 °C (98.6 °F) 36.2 °C (97.2 °F) 36.6 °C (97.8 °F) 36.6 °C (97.9 °F)   TempSrc: Temporal Temporal Temporal Temporal   SpO2: 92% 96% 91% 90%   Weight: 91.1 kg (200 lb 13.4 oz)      Height:           Physical Exam   Constitutional: She is oriented to person, place, and time. No distress.   HENT:   Head: Normocephalic and atraumatic.   Cardiovascular: Exam reveals no gallop.   No murmur heard.  Pulmonary/Chest: Effort normal. No respiratory distress. She has no wheezes.   Abdominal: Soft. She exhibits no distension. There is tenderness. There is no rebound and no guarding.   Musculoskeletal: She exhibits no edema.   Neurological: She is alert and oriented to  person, place, and time.   Skin: She is not diaphoretic.         Labs:  Recent Labs     09/17/19  0735 09/17/19  1756 09/17/19  2354 09/18/19  0606   WBC 10.1  --  5.5  --    RBC 3.93*  --  3.05*  --    HEMOGLOBIN 13.7 11.2* 11.0* 10.7*   HEMATOCRIT 40.8 34.3* 32.4* 31.9*   .8*  --  106.2*  --    MCH 34.9*  --  36.1*  --    MCHC 33.6  --  34.0  --    RDW 50.0  --  51.2*  --    PLATELETCT 258  --  159*  --    MPV 10.3  --  10.3  --      Recent Labs     09/17/19  0735 09/17/19  2354   SODIUM 138 137   POTASSIUM 3.3* 3.2*   CHLORIDE 96 102   CO2 27 28   GLUCOSE 146* 91   BUN 22 17     Recent Labs     09/17/19  0735   APTT 26.3   INR 0.90         Imaging:  US-ABDOMEN COMPLETE SURVEY   Final Result         1.  Hepatomegaly and echogenic liver, compatible with fatty change versus fibrosis   2.  Mild left hydronephrosis         DX-CHEST-PORTABLE (1 VIEW)   Final Result      No acute cardiopulmonary abnormality.                Impressions:  - Esophageal ulcers  - LA-D esophagitis  - Nausea vomiting and diarrhea.  - Hematemesis.  - Melena.  - Status post cholecystectomy.  - Status post sleeve gastrectomy.  - Fever.  - Macrocytosis.  - Lack of screening colonoscopy    53 years old female with past medical history of cholecystectomy, gastroesophageal reflux disease status post sleeve gastrectomy and hiatal hernia repair presented with 3 days history of nausea, vomiting, hematemesis, diarrhea, melena and fever.  On the ED patient was tachycardic, received IV fluid, hemoglobin was 13.7, BUN 22, creatinine 0.74, transaminitis with total bilirubin of 2.2.  Differential diagnosis include gastric ulcer, duodenal ulcer, Jennifer-Flowers tear, diverticulosis, diverticulitis and complications from cholecystectomy.    Recommendations:  - PPI BIDAC X 3 months, then F/U with DR Ochoa at [facility]. Will arrange F/U  - Zofran IV as needed.  - GI soft diet  - Limit less than 1 drink a day  - MV supplement due to gastric sleeves  - Need  outpatient screening colonoscopy   - Will sign off and stand by. Please contact us again if we can be of further assistance.       ===  I have seen and examined the patient today.  I have reviewed the medical record, laboratory data, imaging, and all relevant studies.  I have discussed the assessment and plan of care with the Internal Medicine Resident and agree with their note and plan of care as documented.   Xavi Ochoa M.D.  ===

## 2019-09-18 NOTE — CARE PLAN
Problem: Safety  Goal: Will remain free from injury  Note:   Patient up with stand by assist. Steady on feet. Patient refusing to wear slippers at this time.  Bed in low/locked position. Call light within patient's reach. Hourly rounding in place.      Problem: Bowel/Gastric:  Goal: Normal bowel function is maintained or improved  Note:   Patient had a loose dark stool (black and brown) this morning after drinking some juice. Will continue to monitor bowel movements.

## 2019-09-18 NOTE — PROGRESS NOTES
Discharge instructions given to patient at bedside, verbalizes understanding and states plans for follow-up with PCP and GI. New and home medication review, post-discharge activity level and worsening of symptoms needing follow-up care discussed. Telemetry monitor/IV cathlon removed. All belongings accounted for, all questions answered at this time. Patient refused wheelchair and escort out, patient wants to walk out with friend when he arrives.

## 2019-09-18 NOTE — DISCHARGE INSTRUCTIONS
Discharge Instructions    Discharged to home by car with relative. Discharged via wheelchair, hospital escort: Yes.  Special equipment needed: Not Applicable    Be sure to schedule a follow-up appointment with your primary care doctor or any specialists as instructed.     Discharge Plan:   Diet Plan: Discussed  Activity Level: Discussed  Confirmed Symptoms Management: Discussed  Medication Reconciliation Updated: Yes  Influenza Vaccine Indication: Indicated: 9 to 64 years of age  Influenza Vaccine Given - only chart on this line when given: Influenza Vaccine Given (See MAR)    I understand that a diet low in cholesterol, fat, and sodium is recommended for good health. Unless I have been given specific instructions below for another diet, I accept this instruction as my diet prescription.     Special Instructions:     Gastrointestinal Bleeding  Introduction  Gastrointestinal bleeding is bleeding somewhere along the path food travels through the body (digestive tract). This path is anywhere between the mouth and the opening of the butt (anus). You may have blood in your poop (stools) or have black poop. If you throw up (vomit), there may be blood in it.  This condition can be mild, serious, or even life-threatening. If you have a lot of bleeding, you may need to stay in the hospital.  Follow these instructions at home:  · Take over-the-counter and prescription medicines only as told by your doctor.  · Eat foods that have a lot of fiber in them. These foods include whole grains, fruits, and vegetables. You can also try eating 1-3 prunes each day.  · Drink enough fluid to keep your pee (urine) clear or pale yellow.  · Keep all follow-up visits as told by your doctor. This is important.  Contact a doctor if:  · Your symptoms do not get better.  Get help right away if:  · Your bleeding gets worse.  · You feel dizzy or you pass out (faint).  · You feel weak.  · You have very bad cramps in your back or belly  (abdomen).  · You pass large clumps of blood (clots) in your poop.  · Your symptoms are getting worse.  This information is not intended to replace advice given to you by your health care provider. Make sure you discuss any questions you have with your health care provider.  Document Released: 09/26/2009 Document Revised: 05/25/2017 Document Reviewed: 06/06/2016  © 2017 Elsevier      · Is patient discharged on Warfarin / Coumadin?   No     Depression / Suicide Risk    As you are discharged from [facility], it is important to learn how to keep safe from harming yourself.    Recognize the warning signs:  · Abrupt changes in personality, positive or negative- including increase in energy   · Giving away possessions  · Change in eating patterns- significant weight changes-  positive or negative  · Change in sleeping patterns- unable to sleep or sleeping all the time   · Unwillingness or inability to communicate  · Depression  · Unusual sadness, discouragement and loneliness  · Talk of wanting to die  · Neglect of personal appearance   · Rebelliousness- reckless behavior  · Withdrawal from people/activities they love  · Confusion- inability to concentrate     If you or a loved one observes any of these behaviors or has concerns about self-harm, here's what you can do:  · Talk about it- your feelings and reasons for harming yourself  · Remove any means that you might use to hurt yourself (examples: pills, rope, extension cords, firearm)  · Get professional help from the community (Mental Health, Substance Abuse, psychological counseling)  · Do not be alone:Call your Safe Contact- someone whom you trust who will be there for you.  · Call your local CRISIS HOTLINE 268-3114 or 337-434-3287  · Call your local Children's Mobile Crisis Response Team [place] (943) 902-8150 or www.[employer]  · Call the toll free National Suicide Prevention Hotlines   · National Suicide Prevention Lifeline  659-096-GZQW (2114)  · National Nitro Line Network 800-SUICIDE (636-5586)

## 2019-09-18 NOTE — DISCHARGE SUMMARY
"Discharge Summary    CHIEF COMPLAINT ON ADMISSION  Chief Complaint   Patient presents with   • Nausea/Vomiting/Diarrhea     sx started 3 days ago. Pt reports blood in vomit started yesterday, hasn't been able to eat anything for 2 days. Woke up this morning and \"filled the toilet bowl with blood\".   • Bloody Stools     Reports dark tarry stool for 3 days.        Reason for Admission  Blood in Vomit; Black Stool    Admission Date  9/17/2019    CODE STATUS  Full Code    HPI & HOSPITAL COURSE  This is a 53 y.o. female here with past medical history of sleeve gastrectomy and also history of EGD presented with bloody vomiting and black tarry stool.  Patient noticed signs of upper GI bleed and was then admitted to the hospital.  GI was consulted and performed EGD.  Results showing esophagitis and esophageal ulcer likely the source of bleeding.  No signs of esophageal varices.  Patient remained hemodynamically stable and hemoglobin stable.  Patient tolerated EGD very well and postprocedure patient tolerated diet.  Patient currently feels stable.  GI recommend outpatient colonoscopy.  Patient agreeable to be discharged home and follow-up with GI as outpatient.  Patient is recommended to stop drinking alcohol and also use PPI twice daily.  Patient currently stable and ready to be discharged home.           Therefore, she is discharged in fair and stable condition to home with close outpatient follow-up.    The patient recovered much more quickly than anticipated on admission.    Discharge Date  9/18/2019    FOLLOW UP ITEMS POST DISCHARGE  none    DISCHARGE DIAGNOSES      UGIB (upper gastrointestinal bleed) POA: Yes    Alcoholic hepatitis without ascites POA: Yes    Hypokalemia POA: Yes    Alcohol dependence with unspecified alcohol-induced disorder (HCC) POA: Yes    Macrocytosis without anemia POA: Yes    FOLLOW UP  Follow-up with PCP  Follow-up with GI    MEDICATIONS ON DISCHARGE     Medication List      START taking these " medications      Instructions   folic acid 1 MG Tabs  Start taking on:  9/19/2019  Commonly known as:  FOLVITE   Take 1 Tab by mouth every day.  Dose:  1 mg     magnesium oxide 400 (241.3 Mg) MG Tabs tablet  Start taking on:  9/19/2019  Commonly known as:  MAG-OX   Take 1 Tab by mouth every day.  Dose:  400 mg     sucralfate 1 GM/10ML Susp  Commonly known as:  CARAFATE   Take 10 mL by mouth every 6 hours for 30 days.  Dose:  1 g     thiamine 100 MG tablet  Start taking on:  9/19/2019  Commonly known as:  THIAMINE   Take 1 Tab by mouth every day.  Dose:  100 mg        CHANGE how you take these medications      Instructions   omeprazole 20 MG delayed-release capsule  What changed:  when to take this  Commonly known as:  PRILOSEC   Take 1 Cap by mouth 2 times a day for 30 days.  Dose:  20 mg        CONTINUE taking these medications      Instructions   BIOTIN PO   Take 1 Tab by mouth every day.  Dose:  1 Tab     CALCIUM PO   Take 1 Tab by mouth every day.  Dose:  1 Tab     ONE-A-DAY WOMENS PO   Take 1 Tab by mouth every day.  Dose:  1 Tab            Allergies  Allergies   Allergen Reactions   • Vicodin [Hydrocodone-Acetaminophen] Itching       DIET  Orders Placed This Encounter   Procedures   • Diet Order Low Fiber(GI Soft)     Standing Status:   Standing     Number of Occurrences:   1     Order Specific Question:   Diet:     Answer:   Low Fiber(GI Soft) [2]       ACTIVITY  As tolerated.  Weight bearing as tolerated    CONSULTATIONS  GI    PROCEDURES    9/17/2019 EGD:   Esophagus: LA-D esophagitis with esophageal ulcer, likely the source of bleeding. No esophageal varices.  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       US-ABDOMEN COMPLETE SURVEY   Final Result         1.  Hepatomegaly and echogenic liver, compatible with fatty change versus fibrosis   2.  Mild left hydronephrosis          DX-CHEST-PORTABLE (1 VIEW)   Final Result      No acute cardiopulmonary abnormality.        PE:  Gen: AAOx3, NAD  Eyes: PELLA  Neck: no JVD, no lymphadenopathy  Cardia: RRR, no mrg  Lungs: CTAB, no rales, rhonci or wheezing  Abd: NABS, soft, non extended, no mass  EXT: No C/C/E, peripheral pulse 2+ b/l  Neuro: CN II-XII intact, non focal, reflex 2+ symmetrical  Skin: Intact, no lesion, warm  Psych: Appropriate.      LABORATORY  Lab Results   Component Value Date    SODIUM 137 09/17/2019    POTASSIUM 3.2 (L) 09/17/2019    CHLORIDE 102 09/17/2019    CO2 28 09/17/2019    GLUCOSE 91 09/17/2019    BUN 17 09/17/2019    CREATININE 0.68 09/17/2019    CREATININE 0.7 01/26/2008        Lab Results   Component Value Date    WBC 5.5 09/17/2019    HEMOGLOBIN 10.7 (L) 09/18/2019    HEMATOCRIT 33.2 (L) 09/18/2019    PLATELETCT 159 (L) 09/17/2019        Total time of the discharge process exceeds 38 minutes.

## 2019-09-18 NOTE — PROGRESS NOTES
Bedside report received from day RN. Patient's plan of care reviewed. Patient found resting in bed, A&Ox4. VSS. No signs of distress, declines pain at this time. All needs met. Safety precautions in place. Tele box on. Bed in lowest/locked position. Bed alarm on. Call light and personal belongings within reach. Will continue to monitor.

## 2019-09-18 NOTE — CARE PLAN
Problem: Safety  Goal: Will remain free from falls  Outcome: PROGRESSING AS EXPECTED  Note:   Safety precautions in place. Education provided to patient, verbalized understanding. Bed in lowest/locked position. Bed alarm on. Room close to nurses station. Call light and personal belongings within reach. Will continue to monitor.       Problem: Knowledge Deficit  Goal: Knowledge of disease process/condition, treatment plan, diagnostic tests, and medications will improve  Outcome: PROGRESSING AS EXPECTED  Note:   Discussed plan of care and nursing interventions with patient. Encouraged all questions and concerns. Patient verbalized understanding, all needs met. Call light within reach.

## 2019-09-18 NOTE — PROGRESS NOTES
2 RN skin check complete with Devika RAY.  Devices in place- none.  Skin assessed under devices- N/A.  Confirmed pressure ulcers found on- none.  New potential pressure ulcers noted on- none.   Skin is intact no open sores or tears noted.

## 2019-09-18 NOTE — PROGRESS NOTES
Care of patient assumed after report. Patient awake/alert sitting up in bed,  at bedside. Bed alarm on, call light in reach, fall precautions in place. Discussed plan of care with patient. Will continue to monitor.

## 2019-09-19 LAB — ZINC SERPL-MCNC: 103.8 UG/DL (ref 60–120)

## 2019-09-19 RX ORDER — SUCRALFATE 1 G/1
1 TABLET ORAL
Qty: 120 TAB | Refills: 2 | Status: ON HOLD | OUTPATIENT
Start: 2019-09-19 | End: 2020-01-27

## 2019-09-20 LAB
ANNOTATION COMMENT IMP: NORMAL
RETINYL PALMITATE SERPL-MCNC: 0.06 MG/L (ref 0–0.1)
VIT A SERPL-MCNC: 1.07 MG/L (ref 0.3–1.2)
VIT C SERPL-MCNC: 53 UMOL/L (ref 23–114)

## 2019-09-21 LAB — VIT B1 BLD-MCNC: 85 NMOL/L (ref 70–180)

## 2020-01-22 ENCOUNTER — OFFICE VISIT (OUTPATIENT)
Dept: URGENT CARE | Facility: PHYSICIAN GROUP | Age: 55
End: 2020-01-22
Payer: MEDICAID

## 2020-01-22 ENCOUNTER — APPOINTMENT (OUTPATIENT)
Dept: RADIOLOGY | Facility: MEDICAL CENTER | Age: 55
DRG: 897 | End: 2020-01-22
Attending: EMERGENCY MEDICINE
Payer: MEDICAID

## 2020-01-22 ENCOUNTER — HOSPITAL ENCOUNTER (INPATIENT)
Facility: MEDICAL CENTER | Age: 55
LOS: 5 days | DRG: 897 | End: 2020-01-27
Attending: EMERGENCY MEDICINE | Admitting: INTERNAL MEDICINE
Payer: MEDICAID

## 2020-01-22 VITALS
OXYGEN SATURATION: 97 % | SYSTOLIC BLOOD PRESSURE: 120 MMHG | TEMPERATURE: 97.7 F | RESPIRATION RATE: 22 BRPM | DIASTOLIC BLOOD PRESSURE: 82 MMHG | HEIGHT: 66 IN | BODY MASS INDEX: 27.32 KG/M2 | HEART RATE: 107 BPM | WEIGHT: 170 LBS

## 2020-01-22 DIAGNOSIS — R60.1 GENERALIZED EDEMA: ICD-10-CM

## 2020-01-22 DIAGNOSIS — F10.931 ALCOHOL WITHDRAWAL SYNDROME, WITH DELIRIUM (HCC): ICD-10-CM

## 2020-01-22 DIAGNOSIS — R06.02 SHORTNESS OF BREATH: ICD-10-CM

## 2020-01-22 DIAGNOSIS — R10.84 GENERALIZED ABDOMINAL PAIN: ICD-10-CM

## 2020-01-22 PROBLEM — M79.89 LEG SWELLING: Status: ACTIVE | Noted: 2020-01-22

## 2020-01-22 PROBLEM — F10.939 ALCOHOL WITHDRAWAL (HCC): Status: ACTIVE | Noted: 2020-01-22

## 2020-01-22 PROBLEM — R74.8 ELEVATED LIVER ENZYMES: Status: ACTIVE | Noted: 2020-01-22

## 2020-01-22 PROBLEM — E87.0 HYPERNATREMIA: Status: ACTIVE | Noted: 2020-01-22

## 2020-01-22 LAB
ALBUMIN SERPL BCP-MCNC: 3.8 G/DL (ref 3.2–4.9)
ALBUMIN/GLOB SERPL: 1.1 G/DL
ALP SERPL-CCNC: 95 U/L (ref 30–99)
ALT SERPL-CCNC: 107 U/L (ref 2–50)
ANION GAP SERPL CALC-SCNC: 16 MMOL/L (ref 0–11.9)
AST SERPL-CCNC: 239 U/L (ref 12–45)
BASOPHILS # BLD AUTO: 1.6 % (ref 0–1.8)
BASOPHILS # BLD: 0.07 K/UL (ref 0–0.12)
BILIRUB SERPL-MCNC: 1.1 MG/DL (ref 0.1–1.5)
BUN SERPL-MCNC: 9 MG/DL (ref 8–22)
CALCIUM SERPL-MCNC: 9.5 MG/DL (ref 8.5–10.5)
CHLORIDE SERPL-SCNC: 99 MMOL/L (ref 96–112)
CO2 SERPL-SCNC: 31 MMOL/L (ref 20–33)
CREAT SERPL-MCNC: 0.77 MG/DL (ref 0.5–1.4)
EKG IMPRESSION: NORMAL
EOSINOPHIL # BLD AUTO: 0.05 K/UL (ref 0–0.51)
EOSINOPHIL NFR BLD: 1.2 % (ref 0–6.9)
ERYTHROCYTE [DISTWIDTH] IN BLOOD BY AUTOMATED COUNT: 62.4 FL (ref 35.9–50)
GLOBULIN SER CALC-MCNC: 3.6 G/DL (ref 1.9–3.5)
GLUCOSE SERPL-MCNC: 109 MG/DL (ref 65–99)
HCT VFR BLD AUTO: 43 % (ref 37–47)
HGB BLD-MCNC: 14.5 G/DL (ref 12–16)
IMM GRANULOCYTES # BLD AUTO: 0.01 K/UL (ref 0–0.11)
IMM GRANULOCYTES NFR BLD AUTO: 0.2 % (ref 0–0.9)
LYMPHOCYTES # BLD AUTO: 1.46 K/UL (ref 1–4.8)
LYMPHOCYTES NFR BLD: 34.4 % (ref 22–41)
MCH RBC QN AUTO: 32.3 PG (ref 27–33)
MCHC RBC AUTO-ENTMCNC: 33.7 G/DL (ref 33.6–35)
MCV RBC AUTO: 95.8 FL (ref 81.4–97.8)
MONOCYTES # BLD AUTO: 0.66 K/UL (ref 0–0.85)
MONOCYTES NFR BLD AUTO: 15.5 % (ref 0–13.4)
NEUTROPHILS # BLD AUTO: 2 K/UL (ref 2–7.15)
NEUTROPHILS NFR BLD: 47.1 % (ref 44–72)
NRBC # BLD AUTO: 0 K/UL
NRBC BLD-RTO: 0 /100 WBC
NT-PROBNP SERPL IA-MCNC: 57 PG/ML (ref 0–125)
PLATELET # BLD AUTO: 108 K/UL (ref 164–446)
PMV BLD AUTO: 9.6 FL (ref 9–12.9)
POTASSIUM SERPL-SCNC: 3.2 MMOL/L (ref 3.6–5.5)
PROT SERPL-MCNC: 7.4 G/DL (ref 6–8.2)
RBC # BLD AUTO: 4.49 M/UL (ref 4.2–5.4)
SODIUM SERPL-SCNC: 146 MMOL/L (ref 135–145)
TROPONIN T SERPL-MCNC: 17 NG/L (ref 6–19)
WBC # BLD AUTO: 4.3 K/UL (ref 4.8–10.8)

## 2020-01-22 PROCEDURE — 99223 1ST HOSP IP/OBS HIGH 75: CPT | Performed by: INTERNAL MEDICINE

## 2020-01-22 PROCEDURE — A9270 NON-COVERED ITEM OR SERVICE: HCPCS | Performed by: INTERNAL MEDICINE

## 2020-01-22 PROCEDURE — 96374 THER/PROPH/DIAG INJ IV PUSH: CPT

## 2020-01-22 PROCEDURE — 96372 THER/PROPH/DIAG INJ SC/IM: CPT

## 2020-01-22 PROCEDURE — 96376 TX/PRO/DX INJ SAME DRUG ADON: CPT

## 2020-01-22 PROCEDURE — 700105 HCHG RX REV CODE 258: Performed by: INTERNAL MEDICINE

## 2020-01-22 PROCEDURE — HZ2ZZZZ DETOXIFICATION SERVICES FOR SUBSTANCE ABUSE TREATMENT: ICD-10-PCS | Performed by: INTERNAL MEDICINE

## 2020-01-22 PROCEDURE — 80053 COMPREHEN METABOLIC PANEL: CPT

## 2020-01-22 PROCEDURE — 99214 OFFICE O/P EST MOD 30 MIN: CPT | Performed by: PHYSICIAN ASSISTANT

## 2020-01-22 PROCEDURE — 93005 ELECTROCARDIOGRAM TRACING: CPT | Performed by: EMERGENCY MEDICINE

## 2020-01-22 PROCEDURE — 85025 COMPLETE CBC W/AUTO DIFF WBC: CPT

## 2020-01-22 PROCEDURE — 71045 X-RAY EXAM CHEST 1 VIEW: CPT

## 2020-01-22 PROCEDURE — 700117 HCHG RX CONTRAST REV CODE 255: Performed by: EMERGENCY MEDICINE

## 2020-01-22 PROCEDURE — 84484 ASSAY OF TROPONIN QUANT: CPT

## 2020-01-22 PROCEDURE — 96375 TX/PRO/DX INJ NEW DRUG ADDON: CPT

## 2020-01-22 PROCEDURE — 700111 HCHG RX REV CODE 636 W/ 250 OVERRIDE (IP): Performed by: EMERGENCY MEDICINE

## 2020-01-22 PROCEDURE — 700102 HCHG RX REV CODE 250 W/ 637 OVERRIDE(OP): Performed by: INTERNAL MEDICINE

## 2020-01-22 PROCEDURE — 99285 EMERGENCY DEPT VISIT HI MDM: CPT

## 2020-01-22 PROCEDURE — 770020 HCHG ROOM/CARE - TELE (206)

## 2020-01-22 PROCEDURE — 700111 HCHG RX REV CODE 636 W/ 250 OVERRIDE (IP): Performed by: INTERNAL MEDICINE

## 2020-01-22 PROCEDURE — 93005 ELECTROCARDIOGRAM TRACING: CPT

## 2020-01-22 PROCEDURE — 74177 CT ABD & PELVIS W/CONTRAST: CPT

## 2020-01-22 PROCEDURE — 83880 ASSAY OF NATRIURETIC PEPTIDE: CPT

## 2020-01-22 RX ORDER — LORAZEPAM 2 MG/ML
2 INJECTION INTRAMUSCULAR
Status: DISCONTINUED | OUTPATIENT
Start: 2020-01-22 | End: 2020-01-27 | Stop reason: HOSPADM

## 2020-01-22 RX ORDER — PROMETHAZINE HYDROCHLORIDE 25 MG/1
12.5-25 TABLET ORAL EVERY 4 HOURS PRN
Status: DISCONTINUED | OUTPATIENT
Start: 2020-01-22 | End: 2020-01-27 | Stop reason: HOSPADM

## 2020-01-22 RX ORDER — FUROSEMIDE 10 MG/ML
40 INJECTION INTRAMUSCULAR; INTRAVENOUS ONCE
Status: COMPLETED | OUTPATIENT
Start: 2020-01-22 | End: 2020-01-22

## 2020-01-22 RX ORDER — THIAMINE MONONITRATE (VIT B1) 100 MG
100 TABLET ORAL DAILY
Status: COMPLETED | OUTPATIENT
Start: 2020-01-22 | End: 2020-01-25

## 2020-01-22 RX ORDER — POLYETHYLENE GLYCOL 3350 17 G/17G
1 POWDER, FOR SOLUTION ORAL
Status: DISCONTINUED | OUTPATIENT
Start: 2020-01-22 | End: 2020-01-27 | Stop reason: HOSPADM

## 2020-01-22 RX ORDER — SODIUM CHLORIDE 9 MG/ML
INJECTION, SOLUTION INTRAVENOUS CONTINUOUS
Status: DISCONTINUED | OUTPATIENT
Start: 2020-01-22 | End: 2020-01-25

## 2020-01-22 RX ORDER — BISACODYL 10 MG
10 SUPPOSITORY, RECTAL RECTAL
Status: DISCONTINUED | OUTPATIENT
Start: 2020-01-22 | End: 2020-01-27 | Stop reason: HOSPADM

## 2020-01-22 RX ORDER — ONDANSETRON 4 MG/1
4 TABLET, ORALLY DISINTEGRATING ORAL EVERY 4 HOURS PRN
Status: DISCONTINUED | OUTPATIENT
Start: 2020-01-22 | End: 2020-01-27 | Stop reason: HOSPADM

## 2020-01-22 RX ORDER — LORAZEPAM 2 MG/ML
0.5 INJECTION INTRAMUSCULAR EVERY 4 HOURS PRN
Status: DISCONTINUED | OUTPATIENT
Start: 2020-01-22 | End: 2020-01-27 | Stop reason: HOSPADM

## 2020-01-22 RX ORDER — OMEPRAZOLE 20 MG/1
20 CAPSULE, DELAYED RELEASE ORAL 2 TIMES DAILY
COMMUNITY
End: 2021-06-18 | Stop reason: SDUPTHER

## 2020-01-22 RX ORDER — LORAZEPAM 2 MG/ML
1.5 INJECTION INTRAMUSCULAR
Status: DISCONTINUED | OUTPATIENT
Start: 2020-01-22 | End: 2020-01-27 | Stop reason: HOSPADM

## 2020-01-22 RX ORDER — PROMETHAZINE HYDROCHLORIDE 25 MG/1
12.5-25 SUPPOSITORY RECTAL EVERY 4 HOURS PRN
Status: DISCONTINUED | OUTPATIENT
Start: 2020-01-22 | End: 2020-01-27 | Stop reason: HOSPADM

## 2020-01-22 RX ORDER — OMEPRAZOLE 20 MG/1
20 CAPSULE, DELAYED RELEASE ORAL DAILY
Status: DISCONTINUED | OUTPATIENT
Start: 2020-01-22 | End: 2020-01-27 | Stop reason: HOSPADM

## 2020-01-22 RX ORDER — POTASSIUM CHLORIDE 20 MEQ/1
40 TABLET, EXTENDED RELEASE ORAL ONCE
Status: COMPLETED | OUTPATIENT
Start: 2020-01-22 | End: 2020-01-22

## 2020-01-22 RX ORDER — LORAZEPAM 1 MG/1
0.5 TABLET ORAL EVERY 4 HOURS PRN
Status: DISCONTINUED | OUTPATIENT
Start: 2020-01-22 | End: 2020-01-27 | Stop reason: HOSPADM

## 2020-01-22 RX ORDER — FOLIC ACID 1 MG/1
1 TABLET ORAL DAILY
Status: DISPENSED | OUTPATIENT
Start: 2020-01-22 | End: 2020-01-26

## 2020-01-22 RX ORDER — LORAZEPAM 2 MG/ML
1 INJECTION INTRAMUSCULAR
Status: DISCONTINUED | OUTPATIENT
Start: 2020-01-22 | End: 2020-01-27 | Stop reason: HOSPADM

## 2020-01-22 RX ORDER — LORAZEPAM 2 MG/ML
2 INJECTION INTRAMUSCULAR ONCE
Status: COMPLETED | OUTPATIENT
Start: 2020-01-22 | End: 2020-01-22

## 2020-01-22 RX ORDER — ONDANSETRON 2 MG/ML
4 INJECTION INTRAMUSCULAR; INTRAVENOUS EVERY 4 HOURS PRN
Status: DISCONTINUED | OUTPATIENT
Start: 2020-01-22 | End: 2020-01-27 | Stop reason: HOSPADM

## 2020-01-22 RX ORDER — LORAZEPAM 2 MG/1
2 TABLET ORAL
Status: DISCONTINUED | OUTPATIENT
Start: 2020-01-22 | End: 2020-01-27 | Stop reason: HOSPADM

## 2020-01-22 RX ORDER — LORAZEPAM 1 MG/1
1 TABLET ORAL EVERY 4 HOURS PRN
Status: DISCONTINUED | OUTPATIENT
Start: 2020-01-22 | End: 2020-01-27 | Stop reason: HOSPADM

## 2020-01-22 RX ORDER — PROCHLORPERAZINE EDISYLATE 5 MG/ML
5-10 INJECTION INTRAMUSCULAR; INTRAVENOUS EVERY 4 HOURS PRN
Status: DISCONTINUED | OUTPATIENT
Start: 2020-01-22 | End: 2020-01-27 | Stop reason: HOSPADM

## 2020-01-22 RX ORDER — LORAZEPAM 2 MG/1
4 TABLET ORAL
Status: DISCONTINUED | OUTPATIENT
Start: 2020-01-22 | End: 2020-01-27 | Stop reason: HOSPADM

## 2020-01-22 RX ORDER — AMOXICILLIN 250 MG
2 CAPSULE ORAL 2 TIMES DAILY
Status: DISCONTINUED | OUTPATIENT
Start: 2020-01-22 | End: 2020-01-27 | Stop reason: HOSPADM

## 2020-01-22 RX ADMIN — FOLIC ACID 1 MG: 1 TABLET ORAL at 16:23

## 2020-01-22 RX ADMIN — SODIUM CHLORIDE: 9 INJECTION, SOLUTION INTRAVENOUS at 16:24

## 2020-01-22 RX ADMIN — FUROSEMIDE 40 MG: 10 INJECTION, SOLUTION INTRAMUSCULAR; INTRAVENOUS at 16:22

## 2020-01-22 RX ADMIN — ENOXAPARIN SODIUM 40 MG: 100 INJECTION SUBCUTANEOUS at 16:22

## 2020-01-22 RX ADMIN — POTASSIUM CHLORIDE 40 MEQ: 1500 TABLET, EXTENDED RELEASE ORAL at 17:07

## 2020-01-22 RX ADMIN — Medication 100 MG: at 16:23

## 2020-01-22 RX ADMIN — LORAZEPAM 1 MG: 1 TABLET ORAL at 20:38

## 2020-01-22 RX ADMIN — LORAZEPAM 2 MG: 2 INJECTION INTRAMUSCULAR; INTRAVENOUS at 17:08

## 2020-01-22 RX ADMIN — OMEPRAZOLE 20 MG: 20 CAPSULE, DELAYED RELEASE ORAL at 16:23

## 2020-01-22 RX ADMIN — IOHEXOL 100 ML: 350 INJECTION, SOLUTION INTRAVENOUS at 14:49

## 2020-01-22 RX ADMIN — THERA TABS 1 TABLET: TAB at 16:23

## 2020-01-22 RX ADMIN — LORAZEPAM 1.5 MG: 2 INJECTION INTRAMUSCULAR; INTRAVENOUS at 23:24

## 2020-01-22 RX ADMIN — LORAZEPAM 2 MG: 2 INJECTION INTRAMUSCULAR; INTRAVENOUS at 16:23

## 2020-01-22 ASSESSMENT — LIFESTYLE VARIABLES
ANXIETY: MODERATELY ANXIOUS OR GUARDED, SO ANXIETY IS INFERRED
ORIENTATION AND CLOUDING OF SENSORIUM: ORIENTED AND CAN DO SERIAL ADDITIONS
PAROXYSMAL SWEATS: NO SWEAT VISIBLE
HOW MANY TIMES IN THE PAST YEAR HAVE YOU HAD 5 OR MORE DRINKS IN A DAY: 365
NAUSEA AND VOMITING: *
AUDITORY DISTURBANCES: NOT PRESENT
DOES PATIENT WANT TO STOP DRINKING: NO
AUDITORY DISTURBANCES: NOT PRESENT
VISUAL DISTURBANCES: NOT PRESENT
TOTAL SCORE: 9
PAROXYSMAL SWEATS: BARELY PERCEPTIBLE SWEATING. PALMS MOIST
PAROXYSMAL SWEATS: NO SWEAT VISIBLE
TOTAL SCORE: 9
ANXIETY: *
ORIENTATION AND CLOUDING OF SENSORIUM: ORIENTED AND CAN DO SERIAL ADDITIONS
TREMOR: *
HEADACHE, FULLNESS IN HEAD: NOT PRESENT
AUDITORY DISTURBANCES: NOT PRESENT
TOTAL SCORE: 0
NAUSEA AND VOMITING: NO NAUSEA AND NO VOMITING
DO YOU DRINK ALCOHOL: YES
AGITATION: NORMAL ACTIVITY
EVER HAD A DRINK FIRST THING IN THE MORNING TO STEADY YOUR NERVES TO GET RID OF A HANGOVER: NO
EVER_SMOKED: NEVER
AGITATION: SOMEWHAT MORE THAN NORMAL ACTIVITY
TREMOR: MODERATE TREMOR WITH ARMS EXTENDED
HEADACHE, FULLNESS IN HEAD: VERY MILD
ORIENTATION AND CLOUDING OF SENSORIUM: ORIENTED AND CAN DO SERIAL ADDITIONS
TOTAL SCORE: 17
TOTAL SCORE: 0
EVER FELT BAD OR GUILTY ABOUT YOUR DRINKING: NO
HEADACHE, FULLNESS IN HEAD: NOT PRESENT
CONSUMPTION TOTAL: POSITIVE
VISUAL DISTURBANCES: VERY MILD SENSITIVITY
ANXIETY: MODERATELY ANXIOUS OR GUARDED, SO ANXIETY IS INFERRED
HAVE PEOPLE ANNOYED YOU BY CRITICIZING YOUR DRINKING: NO
ON A TYPICAL DAY WHEN YOU DRINK ALCOHOL HOW MANY DRINKS DO YOU HAVE: 3
AVERAGE NUMBER OF DAYS PER WEEK YOU HAVE A DRINK CONTAINING ALCOHOL: 7
VISUAL DISTURBANCES: MILD SENSITIVITY
AGITATION: SOMEWHAT MORE THAN NORMAL ACTIVITY
HAVE YOU EVER FELT YOU SHOULD CUT DOWN ON YOUR DRINKING: NO
TOTAL SCORE: 0
NAUSEA AND VOMITING: NO NAUSEA AND NO VOMITING
TREMOR: MODERATE TREMOR WITH ARMS EXTENDED

## 2020-01-22 ASSESSMENT — PATIENT HEALTH QUESTIONNAIRE - PHQ9
SUM OF ALL RESPONSES TO PHQ QUESTIONS 1-9: 3
5. POOR APPETITE OR OVEREATING: NOT AT ALL
3. TROUBLE FALLING OR STAYING ASLEEP OR SLEEPING TOO MUCH: SEVERAL DAYS
SUM OF ALL RESPONSES TO PHQ9 QUESTIONS 1 AND 2: 2
1. LITTLE INTEREST OR PLEASURE IN DOING THINGS: SEVERAL DAYS
6. FEELING BAD ABOUT YOURSELF - OR THAT YOU ARE A FAILURE OR HAVE LET YOURSELF OR YOUR FAMILY DOWN: NOT AL ALL
4. FEELING TIRED OR HAVING LITTLE ENERGY: NOT AT ALL
8. MOVING OR SPEAKING SO SLOWLY THAT OTHER PEOPLE COULD HAVE NOTICED. OR THE OPPOSITE, BEING SO FIGETY OR RESTLESS THAT YOU HAVE BEEN MOVING AROUND A LOT MORE THAN USUAL: NOT AT ALL
9. THOUGHTS THAT YOU WOULD BE BETTER OFF DEAD, OR OF HURTING YOURSELF: NOT AT ALL
7. TROUBLE CONCENTRATING ON THINGS, SUCH AS READING THE NEWSPAPER OR WATCHING TELEVISION: NOT AT ALL
2. FEELING DOWN, DEPRESSED, IRRITABLE, OR HOPELESS: SEVERAL DAYS

## 2020-01-22 ASSESSMENT — ENCOUNTER SYMPTOMS
HEMOPTYSIS: 0
MYALGIAS: 0
NERVOUS/ANXIOUS: 0
DEPRESSION: 1
HEARTBURN: 0
NAUSEA: 0
MYALGIAS: 0
BLURRED VISION: 0
FEVER: 0
ABDOMINAL PAIN: 0
SENSORY CHANGE: 0
DIZZINESS: 1
ABDOMINAL PAIN: 1
SHORTNESS OF BREATH: 0
CHILLS: 0
DEPRESSION: 0
HALLUCINATIONS: 0
BACK PAIN: 0
EYE REDNESS: 0
NAUSEA: 0
SEIZURES: 0
NECK PAIN: 0
WEIGHT LOSS: 0
PALPITATIONS: 0
INSOMNIA: 0
SPUTUM PRODUCTION: 0
CHILLS: 0
VOMITING: 0
EYE PAIN: 0
ORTHOPNEA: 0
VOMITING: 0
WHEEZING: 0
FEVER: 0
STRIDOR: 0
BACK PAIN: 0
COUGH: 0
DIZZINESS: 0
COUGH: 0
HEADACHES: 0
TINGLING: 0
SPUTUM PRODUCTION: 0
DIARRHEA: 0
FOCAL WEAKNESS: 0
EYE DISCHARGE: 0
SHORTNESS OF BREATH: 1
HEADACHES: 0
WEAKNESS: 1
BLOOD IN STOOL: 0
DIARRHEA: 0
PALPITATIONS: 0
FOCAL WEAKNESS: 0

## 2020-01-22 ASSESSMENT — COGNITIVE AND FUNCTIONAL STATUS - GENERAL
MOBILITY SCORE: 24
DAILY ACTIVITIY SCORE: 24
SUGGESTED CMS G CODE MODIFIER MOBILITY: CH
SUGGESTED CMS G CODE MODIFIER DAILY ACTIVITY: CH

## 2020-01-22 NOTE — PROGRESS NOTES
"Subjective:      Angélica Beck is a 54 y.o. female who presents with Edema (x 1 month)            The patient is here with complaints of swelling of the face, hands, legs, and abdomen for the past month. She also has shortness of breath, generalized weakness, fatigue, and abdominal pain. The patient was hospitalized about 4 months ago for an Upper gastrointestinal bleed. She was doing well up until 1 month ago. She reports that she drinks \"a few drinks daily.\" She also reports that she has been very depressed. Her  states the patient has a hard time even getting out of bed. He states she has difficulty walking due to shortness of breath.      Past Medical History:   Diagnosis Date   • Anesthesia     PONV   • Cancer (HCC)     basal cell chest   • Disease, jaw     BONE INFECTION   • Heart burn 08/19/2019   • Indigestion    • Unspecified urinary incontinence        Past Surgical History:   Procedure Laterality Date   • GASTROSCOPY-ENDO N/A 9/17/2019    Procedure: GASTROSCOPY;  Surgeon: Xavi Ochoa M.D.;  Location: ENDOSCOPY [facility];  Service: Gastroenterology   • ANTONIO BY LAPAROSCOPY  8/20/2019    Procedure: CHOLECYSTECTOMY, LAPAROSCOPIC;  Surgeon: Lior Olivo M.D.;  Location: SURGERY SAME DAY [facility];  Service: General   • OTHER ABDOMINAL SURGERY  02/2018    gastric sleeve   • BLADDER SLING FEMALE  12/3/2014    Performed by Darell Ferris M.D. at SURGERY [facility]   • HYSTERECTOMY LAPAROSCOPY  2000    fibroidsand endometriosis   • LUMPECTOMY      breast reduction   • OTHER      BREAST REDUCTION. with implants   • OTHER ORTHOPEDIC SURGERY      shoulder arth   • TUBAL COAGULATION LAPAROSCOPIC BILATERAL         Family History   Problem Relation Age of Onset   • Diabetes Father    • Other Father         parkinsons   • Cancer Other         ovarian   • Cancer Maternal Grandmother         ovarian       Allergies   Allergen Reactions   • Vicodin [Hydrocodone-Acetaminophen] Itching " "      Medications, Allergies, and current problem list reviewed today in Epic    Review of Systems   Constitutional: Positive for malaise/fatigue. Negative for chills and fever.        Weight gain   Respiratory: Positive for shortness of breath. Negative for cough, hemoptysis, sputum production and wheezing.    Cardiovascular: Positive for leg swelling. Negative for chest pain and palpitations.        Diffuse edema   Gastrointestinal: Positive for abdominal pain. Negative for blood in stool, diarrhea, melena, nausea and vomiting.   Genitourinary: Negative for dysuria, frequency, hematuria and urgency.   Musculoskeletal: Negative for back pain and myalgias.   Neurological: Positive for dizziness and weakness (generalized). Negative for tingling, sensory change, focal weakness and headaches.   Psychiatric/Behavioral: Positive for depression. Negative for hallucinations.     All other systems reviewed and are negative.        Objective:     /82 (BP Location: Right arm, Patient Position: Sitting, BP Cuff Size: Adult)   Pulse (!) 107   Temp 36.5 °C (97.7 °F) (Temporal)   Resp (!) 22   Ht 1.676 m (5' 6\")   Wt 77.1 kg (170 lb)   SpO2 97%   BMI 27.44 kg/m²      Physical Exam  Constitutional:       General: She is in acute distress.      Appearance: She is ill-appearing. She is not toxic-appearing or diaphoretic.      Comments: Patient is tearful   HENT:      Head: Normocephalic and atraumatic.   Eyes:      General: No scleral icterus.     Conjunctiva/sclera: Conjunctivae normal.   Cardiovascular:      Rate and Rhythm: Regular rhythm. Tachycardia present.      Heart sounds: Normal heart sounds. No murmur. No friction rub. No gallop.    Pulmonary:      Effort: Pulmonary effort is normal. No respiratory distress.      Breath sounds: Normal breath sounds. No wheezing, rhonchi or rales.      Comments: Increased respiratory rate  Abdominal:      General: There is no distension.      Palpations: Abdomen is soft.      " Tenderness: There is no tenderness. There is no guarding.   Musculoskeletal:      Right lower leg: Edema present.      Left lower leg: Edema present.      Comments: Facial edema, moderate edema in upper and lower extremities   Skin:     General: Skin is warm and dry.   Neurological:      General: No focal deficit present.      Mental Status: She is alert and oriented to person, place, and time.   Psychiatric:         Attention and Perception: Attention and perception normal.         Mood and Affect: Mood is depressed. Affect is flat.         Speech: Speech normal.         Behavior: Behavior is withdrawn.         Thought Content: Thought content normal.         Cognition and Memory: Cognition and memory normal.         Judgment: Judgment normal.                 Assessment/Plan:     1. Generalized edema     2. Shortness of breath     3. Generalized abdominal pain         At this time, I feel the patient requires a higher level of care including closer monitoring, stat lab work and/or imaging for further evaluation for multiple medical complaints of Generalized edema. Shortness of breath, and abdominal pain. Explained to the patient that I am concerned about CHF vs liver or kidney abnormalities causing widespread Edema. .This has been discussed with the patient and they state agreement and understanding.  Her significant other agrees to drive her directly to the ED. The patient is in no acute distress upon clinic departure and will go directly to ED without delay.       Lindsey Cotton P.A.-C.

## 2020-01-22 NOTE — ED TRIAGE NOTES
Chief Complaint   Patient presents with   • Leg Swelling     Pt reports symptoms for about 1 mo. Pt states at [facility] today and sent for further reval. pt states recent Gallbladder surgery in Sept.    • Shortness of Breath   • Facial Swelling   • Fatigue   • Cramping     in abd     Explained to pt triage process, made pt aware to tell this RN/staff of any changes/concerns, pt verbalized understanding of process and instructions given. Pt to ER anika.

## 2020-01-22 NOTE — H&P
Hospital Medicine History & Physical Note    Date of Service  1/22/2020    Primary Care Physician  Sandra Frazier M.D.    Consultants  none    Code Status  full    Chief Complaint  LE swelling    History of Presenting Illness  54 y.o. female with no significant past medical history who presented 1/22/2020 with lower extremity swelling for the past 3 weeks.  She stated that she gained about 15 pounds during that time span.  He has been drinking heavily over the past 6 months and drink about 1-1/2 pint a day and her last drink was yesterday.  She stated that she felt like her face are swollen her belly is more distended.  The patient denied nausea, vomiting, abdominal pain.  In the ER she has CT scan of the abdomen done with no acute finding except hepatic steatosis.  She seems to be withdrawing from alcohol in ER.  She will be admitted for further management.    Review of Systems  Review of Systems   Constitutional: Negative for chills, fever and weight loss.   HENT: Negative for congestion and nosebleeds.    Eyes: Negative for blurred vision, pain, discharge and redness.   Respiratory: Negative for cough, sputum production, shortness of breath and stridor.    Cardiovascular: Positive for leg swelling. Negative for chest pain, palpitations and orthopnea.   Gastrointestinal: Negative for abdominal pain, diarrhea, heartburn, nausea and vomiting.   Genitourinary: Negative for dysuria, frequency and urgency.   Musculoskeletal: Negative for back pain, myalgias and neck pain.   Skin: Negative for itching and rash.   Neurological: Negative for dizziness, focal weakness, seizures and headaches.   Psychiatric/Behavioral: Negative for depression. The patient is not nervous/anxious and does not have insomnia.        Past Medical History   has a past medical history of Anesthesia, Cancer (HCC), Disease, jaw, Heart burn (08/19/2019), Indigestion, and Unspecified urinary incontinence.    Surgical History   has a past surgical  history that includes other orthopedic surgery; lumpectomy; other; bladder sling female (12/3/2014); other abdominal surgery (02/2018); tubal coagulation laparoscopic bilateral; hysterectomy laparoscopy (2000); yvonne by laparoscopy (8/20/2019); and gastroscopy-endo (N/A, 9/17/2019).     Family History  family history includes Cancer in her maternal grandmother and another family member; Diabetes in her father; Other in her father.     Social History   reports that she has never smoked. She has never used smokeless tobacco. She reports current alcohol use. She reports that she does not use drugs.    Allergies  Allergies   Allergen Reactions   • Vicodin [Hydrocodone-Acetaminophen] Itching       Medications  Prior to Admission Medications   Prescriptions Last Dose Informant Patient Reported? Taking?   BIOTIN PO  Patient Yes No   Sig: Take 1 Tab by mouth every day.   CALCIUM PO  Patient Yes No   Sig: Take 1 Tab by mouth every day.   Multiple Vitamins-Calcium (ONE-A-DAY WOMENS PO)  Patient Yes No   Sig: Take 1 Tab by mouth every day.   folic acid (FOLVITE) 1 MG Tab   No No   Sig: Take 1 Tab by mouth every day.   magnesium oxide (MAG-OX) 400 (241.3 Mg) MG Tab tablet   No No   Sig: Take 1 Tab by mouth every day.   sucralfate (CARAFATE) 1 GM Tab   No No   Sig: Take 1 Tab by mouth 4 Times a Day,Before Meals and at Bedtime.   thiamine (THIAMINE) 100 MG tablet   No No   Sig: Take 1 Tab by mouth every day.   Patient not taking: Reported on 1/22/2020      Facility-Administered Medications: None       Physical Exam  Temp:  [36.4 °C (97.5 °F)] 36.4 °C (97.5 °F)  Pulse:  [86-99] 86  Resp:  [18-20] 20  BP: (105-134)/(54-89) 119/68  SpO2:  [93 %-97 %] 97 %    Physical Exam  Vitals signs reviewed.   Constitutional:       General: She is not in acute distress.     Appearance: Normal appearance.   HENT:      Head: Normocephalic and atraumatic.      Nose: No congestion or rhinorrhea.   Eyes:      Extraocular Movements: Extraocular  movements intact.      Pupils: Pupils are equal, round, and reactive to light.   Neck:      Musculoskeletal: Normal range of motion and neck supple.   Cardiovascular:      Rate and Rhythm: Normal rate and regular rhythm.      Pulses: Normal pulses.   Pulmonary:      Effort: Pulmonary effort is normal. No respiratory distress.      Breath sounds: Normal breath sounds.   Abdominal:      General: Bowel sounds are normal. There is no distension.      Palpations: Abdomen is soft.      Tenderness: There is no tenderness.   Musculoskeletal:         General: Swelling present. No tenderness.   Skin:     General: Skin is warm.      Findings: No erythema.   Neurological:      General: No focal deficit present.      Mental Status: She is alert and oriented to person, place, and time.         Laboratory:  Recent Labs     01/22/20  1231   WBC 4.3*   RBC 4.49   HEMOGLOBIN 14.5   HEMATOCRIT 43.0   MCV 95.8   MCH 32.3   MCHC 33.7   RDW 62.4*   PLATELETCT 108*   MPV 9.6     Recent Labs     01/22/20  1231   SODIUM 146*   POTASSIUM 3.2*   CHLORIDE 99   CO2 31   GLUCOSE 109*   BUN 9   CREATININE 0.77   CALCIUM 9.5     Recent Labs     01/22/20  1231   ALTSGPT 107*   ASTSGOT 239*   ALKPHOSPHAT 95   TBILIRUBIN 1.1   GLUCOSE 109*         Recent Labs     01/22/20  1231   NTPROBNP 57         Recent Labs     01/22/20  1231   TROPONINT 17       Urinalysis:    No results found     Imaging:  CT-ABDOMEN-PELVIS WITH   Final Result      1.  Marked hepatic steatosis.   2.  Hepatomegaly.   3.  Status post cholecystectomy.   4.  Postsurgical changes of the distal esophagus.   5.  Small hiatal hernia.   6.  Colonic diverticulosis without evidence of diverticulitis.   7.  Status post hysterectomy.      DX-CHEST-PORTABLE (1 VIEW)   Final Result      No acute cardiopulmonary disease evident.            Assessment/Plan:  I anticipate this patient will require at least two midnights for appropriate medical management, necessitating inpatient  admission.    Alcohol withdrawal (HCC)- (present on admission)  Assessment & Plan  CIWA protocol  Aspiration, fall, seizure precaution  Vitamin supplements  Cessation education was given    Leg swelling- (present on admission)  Assessment & Plan  Chest x-ray clear  BNP normal  CT abdomen does not show any signs of ascites  We will get ultrasound to rule out DVT    Hypernatremia- (present on admission)  Assessment & Plan  Patient was started on IV fluid  Follow CMP in the morning    Elevated liver enzymes- (present on admission)  Assessment & Plan  Likely related to alcohol and hepatic steatosis  CT scan showed no acute finding  History of cholecystectomy  Follow CMP in the morning    Hypokalemia- (present on admission)  Assessment & Plan  Replete as needed  Follow CMP in the morning      VTE prophylaxis: lovenox

## 2020-01-22 NOTE — ASSESSMENT & PLAN NOTE
With alcohol dependence    Improved  Long discussion about importance of cessation  Lorazepam as needed per CIWA

## 2020-01-22 NOTE — ED PROVIDER NOTES
ED Provider Note    CHIEF COMPLAINT  Chief Complaint   Patient presents with   • Leg Swelling     Pt reports symptoms for about 1 mo. Pt states at [facility] today and sent for further reval. pt states recent Gallbladder surgery in Sept.    • Shortness of Breath   • Facial Swelling   • Fatigue   • Cramping     in abd       HPI  Angélica Beck is a 54 y.o. female who presents for evaluation of several complaints including reported leg swelling weight gain mild abdominal distention chronic shortness of breath.  The patient has a medical history including gastric sleeve performed in [place] as well as cholecystectomy within the last year.  She reports around a 15 pound weight gain with bilateral lower extremity pedal edema subjective abdominal distention without nausea vomiting constipation hematemesis hematochezia.  She reports some chronic shortness of breath but no substernal chest pain or pressure.  She denies high fever or productive cough no hemoptysis.  She was seen in urgent care and they felt that her symptoms were too complex to evaluate and referred her here for higher level of care  REVIEW OF SYSTEMS  See HPI for further details.  Positive for leg swelling edema abdominal distention dyspnea all other systems are negative.     PAST MEDICAL HISTORY  Past Medical History:   Diagnosis Date   • Heart burn 08/19/2019   • Anesthesia     PONV   • Cancer (HCC)     basal cell chest   • Disease, jaw     BONE INFECTION   • Indigestion    • Unspecified urinary incontinence        FAMILY HISTORY  Noncontributory    SOCIAL HISTORY  Social History     Socioeconomic History   • Marital status: Single     Spouse name: Not on file   • Number of children: Not on file   • Years of education: Not on file   • Highest education level: Not on file   Occupational History   • Not on file   Social Needs   • Financial resource strain: Not on file   • Food insecurity:     Worry: Not on file     Inability: Not on file   • Transportation  needs:     Medical: Not on file     Non-medical: Not on file   Tobacco Use   • Smoking status: Never Smoker   • Smokeless tobacco: Never Used   Substance and Sexual Activity   • Alcohol use: Yes     Frequency: 2-3 times a week     Drinks per session: 1 or 2     Comment: few times a week   • Drug use: No   • Sexual activity: Not on file   Lifestyle   • Physical activity:     Days per week: Not on file     Minutes per session: Not on file   • Stress: Not on file   Relationships   • Social connections:     Talks on phone: Not on file     Gets together: Not on file     Attends Zoroastrian service: Not on file     Active member of club or organization: Not on file     Attends meetings of clubs or organizations: Not on file     Relationship status: Not on file   • Intimate partner violence:     Fear of current or ex partner: Not on file     Emotionally abused: Not on file     Physically abused: Not on file     Forced sexual activity: Not on file   Other Topics Concern   • Not on file   Social History Narrative   • Not on file     Daily heavy alcohol use  SURGICAL HISTORY  Past Surgical History:   Procedure Laterality Date   • GASTROSCOPY-ENDO N/A 9/17/2019    Procedure: GASTROSCOPY;  Surgeon: Xavi Ochoa M.D.;  Location: ENDOSCOPY [facility];  Service: Gastroenterology   • ANTONIO BY LAPAROSCOPY  8/20/2019    Procedure: CHOLECYSTECTOMY, LAPAROSCOPIC;  Surgeon: Lior Olivo M.D.;  Location: SURGERY SAME DAY [facility];  Service: General   • OTHER ABDOMINAL SURGERY  02/2018    gastric sleeve   • BLADDER SLING FEMALE  12/3/2014    Performed by Darell Ferris M.D. at SURGERY [facility]   • HYSTERECTOMY LAPAROSCOPY  2000    fibroidsand endometriosis   • LUMPECTOMY      breast reduction   • OTHER      BREAST REDUCTION. with implants   • OTHER ORTHOPEDIC SURGERY      shoulder arth   • TUBAL COAGULATION LAPAROSCOPIC BILATERAL         CURRENT MEDICATIONS    Current Facility-Administered Medications:   •  LORazepam  "(ATIVAN) injection 2 mg, 2 mg, Intravenous, Once, Chapito Vidal M.D.  •  furosemide (LASIX) injection 40 mg, 40 mg, Intravenous, Once, Chapito Vidal M.D.    Current Outpatient Medications:   •  sucralfate (CARAFATE) 1 GM Tab, Take 1 Tab by mouth 4 Times a Day,Before Meals and at Bedtime., Disp: 120 Tab, Rfl: 2  •  magnesium oxide (MAG-OX) 400 (241.3 Mg) MG Tab tablet, Take 1 Tab by mouth every day., Disp: 30 Tab, Rfl: 1  •  thiamine (THIAMINE) 100 MG tablet, Take 1 Tab by mouth every day. (Patient not taking: Reported on 1/22/2020), Disp: 30 Tab, Rfl: 0  •  folic acid (FOLVITE) 1 MG Tab, Take 1 Tab by mouth every day., Disp: 30 Tab, Rfl: 0  •  CALCIUM PO, Take 1 Tab by mouth every day., Disp: , Rfl:   •  BIOTIN PO, Take 1 Tab by mouth every day., Disp: , Rfl:   •  Multiple Vitamins-Calcium (ONE-A-DAY WOMENS PO), Take 1 Tab by mouth every day., Disp: , Rfl:       ALLERGIES  Allergies   Allergen Reactions   • Vicodin [Hydrocodone-Acetaminophen] Itching       PHYSICAL EXAM  VITAL SIGNS: /68   Pulse 86   Temp 36.4 °C (97.5 °F) (Temporal)   Resp 20   Ht 1.676 m (5' 6\")   Wt 93.5 kg (206 lb 2.1 oz)   SpO2 97%   BMI 33.27 kg/m²       Constitutional: Patient appears chronically ill  HENT: Normocephalic, Atraumatic, Bilateral external ears normal, Oropharynx moist, No oral exudates, Nose normal.   Eyes: PERRLA, EOMI, Conjunctiva normal, No discharge.   Neck: Normal range of motion, No tenderness, Supple, No stridor.   Cardiovascular: Normal heart rate, Normal rhythm, No murmurs, No rubs, No gallops.   Thorax & Lungs: Normal breath sounds, No respiratory distress, No wheezing, No chest tenderness.   Abdomen: Bowel sounds normal, mild abdominal distention without fluid wave no hernias or masses  Skin: Warm, Dry, No erythema, No rash.   Back: No tenderness, No CVA tenderness.   Extremities: Bilateral symmetric 4+ pitting edema from the knee down through the foot no erythema no tenderness negative Homans sign "   neurologic: Alert & oriented x 3, Normal motor function, Normal sensory function, No focal deficits noted.   Psychiatric: Anxious  CT-ABDOMEN-PELVIS WITH   Final Result      1.  Marked hepatic steatosis.   2.  Hepatomegaly.   3.  Status post cholecystectomy.   4.  Postsurgical changes of the distal esophagus.   5.  Small hiatal hernia.   6.  Colonic diverticulosis without evidence of diverticulitis.   7.  Status post hysterectomy.      DX-CHEST-PORTABLE (1 VIEW)   Final Result      No acute cardiopulmonary disease evident.          Results for orders placed or performed during the hospital encounter of 01/22/20   CBC WITH DIFFERENTIAL   Result Value Ref Range    WBC 4.3 (L) 4.8 - 10.8 K/uL    RBC 4.49 4.20 - 5.40 M/uL    Hemoglobin 14.5 12.0 - 16.0 g/dL    Hematocrit 43.0 37.0 - 47.0 %    MCV 95.8 81.4 - 97.8 fL    MCH 32.3 27.0 - 33.0 pg    MCHC 33.7 33.6 - 35.0 g/dL    RDW 62.4 (H) 35.9 - 50.0 fL    Platelet Count 108 (L) 164 - 446 K/uL    MPV 9.6 9.0 - 12.9 fL    Neutrophils-Polys 47.10 44.00 - 72.00 %    Lymphocytes 34.40 22.00 - 41.00 %    Monocytes 15.50 (H) 0.00 - 13.40 %    Eosinophils 1.20 0.00 - 6.90 %    Basophils 1.60 0.00 - 1.80 %    Immature Granulocytes 0.20 0.00 - 0.90 %    Nucleated RBC 0.00 /100 WBC    Neutrophils (Absolute) 2.00 2.00 - 7.15 K/uL    Lymphs (Absolute) 1.46 1.00 - 4.80 K/uL    Monos (Absolute) 0.66 0.00 - 0.85 K/uL    Eos (Absolute) 0.05 0.00 - 0.51 K/uL    Baso (Absolute) 0.07 0.00 - 0.12 K/uL    Immature Granulocytes (abs) 0.01 0.00 - 0.11 K/uL    NRBC (Absolute) 0.00 K/uL   COMP METABOLIC PANEL   Result Value Ref Range    Sodium 146 (H) 135 - 145 mmol/L    Potassium 3.2 (L) 3.6 - 5.5 mmol/L    Chloride 99 96 - 112 mmol/L    Co2 31 20 - 33 mmol/L    Anion Gap 16.0 (H) 0.0 - 11.9    Glucose 109 (H) 65 - 99 mg/dL    Bun 9 8 - 22 mg/dL    Creatinine 0.77 0.50 - 1.40 mg/dL    Calcium 9.5 8.5 - 10.5 mg/dL    AST(SGOT) 239 (H) 12 - 45 U/L    ALT(SGPT) 107 (H) 2 - 50 U/L    Alkaline  Phosphatase 95 30 - 99 U/L    Total Bilirubin 1.1 0.1 - 1.5 mg/dL    Albumin 3.8 3.2 - 4.9 g/dL    Total Protein 7.4 6.0 - 8.2 g/dL    Globulin 3.6 (H) 1.9 - 3.5 g/dL    A-G Ratio 1.1 g/dL   ESTIMATED GFR   Result Value Ref Range    GFR If African American >60 >60 mL/min/1.73 m 2    GFR If Non African American >60 >60 mL/min/1.73 m 2   proBrain Natriuretic Peptide, NT   Result Value Ref Range    NT-proBNP 57 0 - 125 pg/mL   TROPONIN   Result Value Ref Range    Troponin T 17 6 - 19 ng/L   EKG   Result Value Ref Range    Report       [facility] Emergency Dept.    Test Date:  2020  Pt Name:    BETSY RAY                  Department: ER  MRN:        [id number]                      Room:  Gender:     Female                       Technician: [id number]  :        [id number]                   Requested By:ER TRIAGE PROTOCOL  Order #:    [id number]                    Reading MD:    Measurements  Intervals                                Axis  Rate:       77                           P:  PA:                                      QRS:        -24  QRSD:       104                          T:          20  QT:         425  QTc:        482    Interpretive Statements  Accelerated junctional rhythm  Inferior infarct, old  Compared to ECG 2014 15:28:17  Accelerated junctional rhythm now present  Sinus rhythm no longer present  Myocardial infarct finding still present        EKG interpretation by me rate 77, this appears to be sinus rhythm spite the electronic reading.  There is significant motion artifact likely obscuring P waves but the complexes narrow it is regular and I can consistently see some P waves.  This likely suggest against arrhythmia.  No acute ST segment elevation  COURSE & MEDICAL DECISION MAKING  Pertinent Labs & Imaging studies reviewed. (See chart for details)  Patient presents here with weight gain, leg swelling some abdominal distention chronic shortness of breath.  Her work appears  suggest against significant cardiopulmonary process such as heart attack acute heart failure.  She does have some leg swelling and moderate to severe liver disease on work-up on exam.  She is observed for several hours got progressively more tremulous.  I suspect she will go into moderate alcohol withdrawal while she is here and she will require diuretics for fluid overload.  She will be admitted to internal medicine  FINAL IMPRESSION  1.  Anasarca  2.  Acute volume overload  3.  Mild alcohol withdrawal  4.  Alcohol hepatitis    Admission         Electronically signed by: Chapito Vidal M.D., 1/22/2020 1:29 PM

## 2020-01-23 ENCOUNTER — APPOINTMENT (OUTPATIENT)
Dept: RADIOLOGY | Facility: MEDICAL CENTER | Age: 55
DRG: 897 | End: 2020-01-23
Attending: INTERNAL MEDICINE
Payer: MEDICAID

## 2020-01-23 LAB
ALBUMIN SERPL BCP-MCNC: 3.5 G/DL (ref 3.2–4.9)
ALBUMIN/GLOB SERPL: 1.1 G/DL
ALP SERPL-CCNC: 85 U/L (ref 30–99)
ALT SERPL-CCNC: 98 U/L (ref 2–50)
ANION GAP SERPL CALC-SCNC: 15 MMOL/L (ref 0–11.9)
AST SERPL-CCNC: 220 U/L (ref 12–45)
BILIRUB SERPL-MCNC: 1.4 MG/DL (ref 0.1–1.5)
BUN SERPL-MCNC: 9 MG/DL (ref 8–22)
CALCIUM SERPL-MCNC: 9 MG/DL (ref 8.5–10.5)
CHLORIDE SERPL-SCNC: 93 MMOL/L (ref 96–112)
CO2 SERPL-SCNC: 29 MMOL/L (ref 20–33)
CREAT SERPL-MCNC: 0.65 MG/DL (ref 0.5–1.4)
ERYTHROCYTE [DISTWIDTH] IN BLOOD BY AUTOMATED COUNT: 61.1 FL (ref 35.9–50)
GLOBULIN SER CALC-MCNC: 3.3 G/DL (ref 1.9–3.5)
GLUCOSE SERPL-MCNC: 112 MG/DL (ref 65–99)
HCT VFR BLD AUTO: 39.7 % (ref 37–47)
HGB BLD-MCNC: 13.4 G/DL (ref 12–16)
MCH RBC QN AUTO: 31.5 PG (ref 27–33)
MCHC RBC AUTO-ENTMCNC: 33.8 G/DL (ref 33.6–35)
MCV RBC AUTO: 93.2 FL (ref 81.4–97.8)
PLATELET # BLD AUTO: 95 K/UL (ref 164–446)
PMV BLD AUTO: 9.8 FL (ref 9–12.9)
POTASSIUM SERPL-SCNC: 3.5 MMOL/L (ref 3.6–5.5)
PROT SERPL-MCNC: 6.8 G/DL (ref 6–8.2)
RBC # BLD AUTO: 4.26 M/UL (ref 4.2–5.4)
SODIUM SERPL-SCNC: 137 MMOL/L (ref 135–145)
WBC # BLD AUTO: 5.1 K/UL (ref 4.8–10.8)

## 2020-01-23 PROCEDURE — 93970 EXTREMITY STUDY: CPT | Mod: 26 | Performed by: INTERNAL MEDICINE

## 2020-01-23 PROCEDURE — 85027 COMPLETE CBC AUTOMATED: CPT

## 2020-01-23 PROCEDURE — 700102 HCHG RX REV CODE 250 W/ 637 OVERRIDE(OP): Performed by: HOSPITALIST

## 2020-01-23 PROCEDURE — 700111 HCHG RX REV CODE 636 W/ 250 OVERRIDE (IP): Performed by: INTERNAL MEDICINE

## 2020-01-23 PROCEDURE — A9270 NON-COVERED ITEM OR SERVICE: HCPCS | Performed by: HOSPITALIST

## 2020-01-23 PROCEDURE — 99233 SBSQ HOSP IP/OBS HIGH 50: CPT | Performed by: HOSPITALIST

## 2020-01-23 PROCEDURE — 36415 COLL VENOUS BLD VENIPUNCTURE: CPT

## 2020-01-23 PROCEDURE — 770020 HCHG ROOM/CARE - TELE (206)

## 2020-01-23 PROCEDURE — A9270 NON-COVERED ITEM OR SERVICE: HCPCS | Performed by: INTERNAL MEDICINE

## 2020-01-23 PROCEDURE — 700111 HCHG RX REV CODE 636 W/ 250 OVERRIDE (IP): Performed by: HOSPITALIST

## 2020-01-23 PROCEDURE — 80053 COMPREHEN METABOLIC PANEL: CPT

## 2020-01-23 PROCEDURE — 700102 HCHG RX REV CODE 250 W/ 637 OVERRIDE(OP): Performed by: INTERNAL MEDICINE

## 2020-01-23 PROCEDURE — 700105 HCHG RX REV CODE 258: Performed by: INTERNAL MEDICINE

## 2020-01-23 PROCEDURE — 93970 EXTREMITY STUDY: CPT

## 2020-01-23 RX ORDER — POTASSIUM CHLORIDE 20 MEQ/1
40 TABLET, EXTENDED RELEASE ORAL ONCE
Status: COMPLETED | OUTPATIENT
Start: 2020-01-23 | End: 2020-01-23

## 2020-01-23 RX ORDER — CHLORDIAZEPOXIDE HYDROCHLORIDE 25 MG/1
25 CAPSULE, GELATIN COATED ORAL EVERY 6 HOURS
Status: DISCONTINUED | OUTPATIENT
Start: 2020-01-24 | End: 2020-01-25

## 2020-01-23 RX ORDER — LORAZEPAM 2 MG/ML
4 INJECTION INTRAMUSCULAR ONCE
Status: COMPLETED | OUTPATIENT
Start: 2020-01-23 | End: 2020-01-23

## 2020-01-23 RX ORDER — CHLORDIAZEPOXIDE HYDROCHLORIDE 25 MG/1
50 CAPSULE, GELATIN COATED ORAL EVERY 6 HOURS
Status: COMPLETED | OUTPATIENT
Start: 2020-01-23 | End: 2020-01-23

## 2020-01-23 RX ADMIN — LORAZEPAM 3 MG: 2 TABLET ORAL at 00:47

## 2020-01-23 RX ADMIN — CHLORDIAZEPOXIDE HYDROCHLORIDE 50 MG: 25 CAPSULE ORAL at 23:58

## 2020-01-23 RX ADMIN — LORAZEPAM 4 MG: 2 INJECTION INTRAMUSCULAR; INTRAVENOUS at 07:42

## 2020-01-23 RX ADMIN — ENOXAPARIN SODIUM 40 MG: 100 INJECTION SUBCUTANEOUS at 05:28

## 2020-01-23 RX ADMIN — SODIUM CHLORIDE: 9 INJECTION, SOLUTION INTRAVENOUS at 17:54

## 2020-01-23 RX ADMIN — LORAZEPAM 3 MG: 2 TABLET ORAL at 10:50

## 2020-01-23 RX ADMIN — LORAZEPAM 1 MG: 1 TABLET ORAL at 23:58

## 2020-01-23 RX ADMIN — POTASSIUM CHLORIDE 40 MEQ: 1500 TABLET, EXTENDED RELEASE ORAL at 15:32

## 2020-01-23 RX ADMIN — LORAZEPAM 3 MG: 2 TABLET ORAL at 01:58

## 2020-01-23 RX ADMIN — LORAZEPAM 1.5 MG: 2 INJECTION INTRAMUSCULAR; INTRAVENOUS at 04:01

## 2020-01-23 RX ADMIN — LORAZEPAM 2 MG: 2 INJECTION INTRAMUSCULAR; INTRAVENOUS at 06:42

## 2020-01-23 RX ADMIN — CHLORDIAZEPOXIDE HYDROCHLORIDE 50 MG: 25 CAPSULE ORAL at 08:51

## 2020-01-23 RX ADMIN — LORAZEPAM 3 MG: 2 TABLET ORAL at 09:33

## 2020-01-23 RX ADMIN — CHLORDIAZEPOXIDE HYDROCHLORIDE 50 MG: 25 CAPSULE ORAL at 12:03

## 2020-01-23 RX ADMIN — LORAZEPAM 1 MG: 2 INJECTION INTRAMUSCULAR; INTRAVENOUS at 17:50

## 2020-01-23 RX ADMIN — SODIUM CHLORIDE: 9 INJECTION, SOLUTION INTRAVENOUS at 04:06

## 2020-01-23 RX ADMIN — LORAZEPAM 2 MG: 2 INJECTION INTRAMUSCULAR; INTRAVENOUS at 06:29

## 2020-01-23 RX ADMIN — LORAZEPAM 1 MG: 1 TABLET ORAL at 19:27

## 2020-01-23 RX ADMIN — LORAZEPAM 1 MG: 2 INJECTION INTRAMUSCULAR; INTRAVENOUS at 12:03

## 2020-01-23 RX ADMIN — LORAZEPAM 3 MG: 2 TABLET ORAL at 03:09

## 2020-01-23 RX ADMIN — LORAZEPAM 1.5 MG: 2 INJECTION INTRAMUSCULAR; INTRAVENOUS at 05:26

## 2020-01-23 RX ADMIN — OMEPRAZOLE 20 MG: 20 CAPSULE, DELAYED RELEASE ORAL at 05:31

## 2020-01-23 RX ADMIN — LORAZEPAM 1 MG: 2 INJECTION INTRAMUSCULAR; INTRAVENOUS at 15:31

## 2020-01-23 RX ADMIN — CHLORDIAZEPOXIDE HYDROCHLORIDE 50 MG: 25 CAPSULE ORAL at 17:50

## 2020-01-23 RX ADMIN — LORAZEPAM 1 MG: 2 INJECTION INTRAMUSCULAR; INTRAVENOUS at 13:46

## 2020-01-23 RX ADMIN — Medication 100 MG: at 05:30

## 2020-01-23 ASSESSMENT — LIFESTYLE VARIABLES
PAROXYSMAL SWEATS: BARELY PERCEPTIBLE SWEATING. PALMS MOIST
AGITATION: SOMEWHAT MORE THAN NORMAL ACTIVITY
ANXIETY: NO ANXIETY (AT EASE)
NAUSEA AND VOMITING: *
HEADACHE, FULLNESS IN HEAD: NOT PRESENT
AUDITORY DISTURBANCES: MILD HARSHNESS OR ABILITY TO FRIGHTEN
AUDITORY DISTURBANCES: MILD HARSHNESS OR ABILITY TO FRIGHTEN
ORIENTATION AND CLOUDING OF SENSORIUM: ORIENTED AND CAN DO SERIAL ADDITIONS
AUDITORY DISTURBANCES: NOT PRESENT
TOTAL SCORE: 16
ORIENTATION AND CLOUDING OF SENSORIUM: CANNOT DO SERIAL ADDITIONS OR IS UNCERTAIN ABOUT DATE
ORIENTATION AND CLOUDING OF SENSORIUM: ORIENTED AND CAN DO SERIAL ADDITIONS
ANXIETY: MODERATELY ANXIOUS OR GUARDED, SO ANXIETY IS INFERRED
AGITATION: *
NAUSEA AND VOMITING: *
TOTAL SCORE: 15
AUDITORY DISTURBANCES: NOT PRESENT
PAROXYSMAL SWEATS: BARELY PERCEPTIBLE SWEATING. PALMS MOIST
AGITATION: *
TOTAL SCORE: 16
AUDITORY DISTURBANCES: NOT PRESENT
VISUAL DISTURBANCES: MODERATELY SEVERE HALLUCINATIONS
ANXIETY: MODERATELY ANXIOUS OR GUARDED, SO ANXIETY IS INFERRED
ORIENTATION AND CLOUDING OF SENSORIUM: CANNOT DO SERIAL ADDITIONS OR IS UNCERTAIN ABOUT DATE
NAUSEA AND VOMITING: *
ANXIETY: *
TOTAL SCORE: 30
HEADACHE, FULLNESS IN HEAD: VERY SEVERE
AUDITORY DISTURBANCES: NOT PRESENT
ANXIETY: *
AUDITORY DISTURBANCES: NOT PRESENT
AUDITORY DISTURBANCES: MILD HARSHNESS OR ABILITY TO FRIGHTEN
ORIENTATION AND CLOUDING OF SENSORIUM: CANNOT DO SERIAL ADDITIONS OR IS UNCERTAIN ABOUT DATE
HEADACHE, FULLNESS IN HEAD: VERY SEVERE
VISUAL DISTURBANCES: SEVERE HALLUCINATIONS
AGITATION: NORMAL ACTIVITY
ORIENTATION AND CLOUDING OF SENSORIUM: CANNOT DO SERIAL ADDITIONS OR IS UNCERTAIN ABOUT DATE
ANXIETY: MODERATELY ANXIOUS OR GUARDED, SO ANXIETY IS INFERRED
TREMOR: *
HEADACHE, FULLNESS IN HEAD: NOT PRESENT
AGITATION: MODERATELY FIDGETY AND RESTLESS
ANXIETY: *
HEADACHE, FULLNESS IN HEAD: VERY SEVERE
VISUAL DISTURBANCES: SEVERE HALLUCINATIONS
AGITATION: SOMEWHAT MORE THAN NORMAL ACTIVITY
AGITATION: NORMAL ACTIVITY
HEADACHE, FULLNESS IN HEAD: SEVERE
HEADACHE, FULLNESS IN HEAD: VERY MILD
NAUSEA AND VOMITING: *
PAROXYSMAL SWEATS: BARELY PERCEPTIBLE SWEATING. PALMS MOIST
PAROXYSMAL SWEATS: BARELY PERCEPTIBLE SWEATING. PALMS MOIST
ANXIETY: MODERATELY ANXIOUS OR GUARDED, SO ANXIETY IS INFERRED
TOTAL SCORE: 27
HEADACHE, FULLNESS IN HEAD: VERY SEVERE
VISUAL DISTURBANCES: MODERATELY SEVERE HALLUCINATIONS
ANXIETY: *
HEADACHE, FULLNESS IN HEAD: NOT PRESENT
NAUSEA AND VOMITING: NO NAUSEA AND NO VOMITING
PAROXYSMAL SWEATS: NO SWEAT VISIBLE
ORIENTATION AND CLOUDING OF SENSORIUM: ORIENTED AND CAN DO SERIAL ADDITIONS
NAUSEA AND VOMITING: NO NAUSEA AND NO VOMITING
NAUSEA AND VOMITING: *
TREMOR: *
HEADACHE, FULLNESS IN HEAD: MODERATE
AGITATION: NORMAL ACTIVITY
AUDITORY DISTURBANCES: NOT PRESENT
NAUSEA AND VOMITING: *
ORIENTATION AND CLOUDING OF SENSORIUM: ORIENTED AND CAN DO SERIAL ADDITIONS
TREMOR: *
HEADACHE, FULLNESS IN HEAD: SEVERE
ANXIETY: MILDLY ANXIOUS
VISUAL DISTURBANCES: MODERATELY SEVERE HALLUCINATIONS
AUDITORY DISTURBANCES: MILD HARSHNESS OR ABILITY TO FRIGHTEN
ORIENTATION AND CLOUDING OF SENSORIUM: CANNOT DO SERIAL ADDITIONS OR IS UNCERTAIN ABOUT DATE
ANXIETY: NO ANXIETY (AT EASE)
VISUAL DISTURBANCES: MODERATELY SEVERE HALLUCINATIONS
NAUSEA AND VOMITING: *
PAROXYSMAL SWEATS: *
VISUAL DISTURBANCES: MODERATE SENSITIVITY
ORIENTATION AND CLOUDING OF SENSORIUM: CANNOT DO SERIAL ADDITIONS OR IS UNCERTAIN ABOUT DATE
VISUAL DISTURBANCES: MODERATE SENSITIVITY
TREMOR: MODERATE TREMOR WITH ARMS EXTENDED
ORIENTATION AND CLOUDING OF SENSORIUM: CANNOT DO SERIAL ADDITIONS OR IS UNCERTAIN ABOUT DATE
TOTAL SCORE: 27
TREMOR: *
NAUSEA AND VOMITING: *
PAROXYSMAL SWEATS: *
VISUAL DISTURBANCES: SEVERE HALLUCINATIONS
TREMOR: *
VISUAL DISTURBANCES: SEVERE HALLUCINATIONS
AGITATION: *
PAROXYSMAL SWEATS: *
HEADACHE, FULLNESS IN HEAD: VERY SEVERE
TREMOR: *
AGITATION: SOMEWHAT MORE THAN NORMAL ACTIVITY
TREMOR: *
AUDITORY DISTURBANCES: NOT PRESENT
HEADACHE, FULLNESS IN HEAD: VERY SEVERE
AGITATION: *
NAUSEA AND VOMITING: *
VISUAL DISTURBANCES: NOT PRESENT
ANXIETY: MODERATELY ANXIOUS OR GUARDED, SO ANXIETY IS INFERRED
TOTAL SCORE: 26
VISUAL DISTURBANCES: MILD SENSITIVITY
HEADACHE, FULLNESS IN HEAD: MODERATELY SEVERE
ORIENTATION AND CLOUDING OF SENSORIUM: ORIENTED AND CAN DO SERIAL ADDITIONS
ANXIETY: MODERATELY ANXIOUS OR GUARDED, SO ANXIETY IS INFERRED
NAUSEA AND VOMITING: *
AUDITORY DISTURBANCES: VERY MILD HARSHNESS OR ABILITY TO FRIGHTEN
TREMOR: *
VISUAL DISTURBANCES: SEVERE HALLUCINATIONS
VISUAL DISTURBANCES: SEVERE HALLUCINATIONS
AGITATION: *
PAROXYSMAL SWEATS: *
PAROXYSMAL SWEATS: BARELY PERCEPTIBLE SWEATING. PALMS MOIST
PAROXYSMAL SWEATS: BARELY PERCEPTIBLE SWEATING. PALMS MOIST
ANXIETY: NO ANXIETY (AT EASE)
TREMOR: *
TREMOR: *
PAROXYSMAL SWEATS: BARELY PERCEPTIBLE SWEATING. PALMS MOIST
ORIENTATION AND CLOUDING OF SENSORIUM: ORIENTED AND CAN DO SERIAL ADDITIONS
TREMOR: *
ANXIETY: NO ANXIETY (AT EASE)
TOTAL SCORE: 13
PAROXYSMAL SWEATS: BARELY PERCEPTIBLE SWEATING. PALMS MOIST
AUDITORY DISTURBANCES: NOT PRESENT
AUDITORY DISTURBANCES: MILD HARSHNESS OR ABILITY TO FRIGHTEN
VISUAL DISTURBANCES: SEVERE HALLUCINATIONS
TOTAL SCORE: 13
PAROXYSMAL SWEATS: BARELY PERCEPTIBLE SWEATING. PALMS MOIST
NAUSEA AND VOMITING: *
PAROXYSMAL SWEATS: NO SWEAT VISIBLE
AGITATION: NORMAL ACTIVITY
AGITATION: NORMAL ACTIVITY
TREMOR: *
AGITATION: *
ORIENTATION AND CLOUDING OF SENSORIUM: DATE DISORIENTATION BY NO MORE THAN TWO CALENDAR DAYS
NAUSEA AND VOMITING: NO NAUSEA AND NO VOMITING
TOTAL SCORE: 13
AUDITORY DISTURBANCES: NOT PRESENT
HEADACHE, FULLNESS IN HEAD: VERY SEVERE
TREMOR: *
VISUAL DISTURBANCES: MODERATELY SEVERE HALLUCINATIONS
HEADACHE, FULLNESS IN HEAD: SEVERE
ANXIETY: MILDLY ANXIOUS
HEADACHE, FULLNESS IN HEAD: VERY SEVERE
VISUAL DISTURBANCES: MODERATE SENSITIVITY
VISUAL DISTURBANCES: SEVERE HALLUCINATIONS
PAROXYSMAL SWEATS: NO SWEAT VISIBLE
ORIENTATION AND CLOUDING OF SENSORIUM: CANNOT DO SERIAL ADDITIONS OR IS UNCERTAIN ABOUT DATE
AUDITORY DISTURBANCES: MILD HARSHNESS OR ABILITY TO FRIGHTEN
NAUSEA AND VOMITING: *
AUDITORY DISTURBANCES: MILD HARSHNESS OR ABILITY TO FRIGHTEN
NAUSEA AND VOMITING: NO NAUSEA AND NO VOMITING
TOTAL SCORE: 9
AGITATION: NORMAL ACTIVITY
PAROXYSMAL SWEATS: BARELY PERCEPTIBLE SWEATING. PALMS MOIST
TREMOR: *
HEADACHE, FULLNESS IN HEAD: MODERATE
PAROXYSMAL SWEATS: NO SWEAT VISIBLE
ANXIETY: MODERATELY ANXIOUS OR GUARDED, SO ANXIETY IS INFERRED
AUDITORY DISTURBANCES: MILD HARSHNESS OR ABILITY TO FRIGHTEN
NAUSEA AND VOMITING: *
AGITATION: NORMAL ACTIVITY
ORIENTATION AND CLOUDING OF SENSORIUM: CANNOT DO SERIAL ADDITIONS OR IS UNCERTAIN ABOUT DATE
TOTAL SCORE: 19
TOTAL SCORE: 24
ORIENTATION AND CLOUDING OF SENSORIUM: ORIENTED AND CAN DO SERIAL ADDITIONS
TOTAL SCORE: 24
TREMOR: *
TOTAL SCORE: 31
HEADACHE, FULLNESS IN HEAD: MILD
TREMOR: *
TOTAL SCORE: 16
TOTAL SCORE: 26
AUDITORY DISTURBANCES: MILD HARSHNESS OR ABILITY TO FRIGHTEN
TREMOR: *
PAROXYSMAL SWEATS: *
ANXIETY: MODERATELY ANXIOUS OR GUARDED, SO ANXIETY IS INFERRED
TOTAL SCORE: 14
AGITATION: NORMAL ACTIVITY
TOTAL SCORE: 23
ORIENTATION AND CLOUDING OF SENSORIUM: CANNOT DO SERIAL ADDITIONS OR IS UNCERTAIN ABOUT DATE
NAUSEA AND VOMITING: NO NAUSEA AND NO VOMITING
TREMOR: *
AGITATION: NORMAL ACTIVITY
VISUAL DISTURBANCES: MODERATELY SEVERE HALLUCINATIONS
ORIENTATION AND CLOUDING OF SENSORIUM: ORIENTED AND CAN DO SERIAL ADDITIONS
ANXIETY: NO ANXIETY (AT EASE)
NAUSEA AND VOMITING: NO NAUSEA AND NO VOMITING

## 2020-01-23 NOTE — ED NOTES
patient resting comfortably,bathroom assistance offered. all questions answered  Complaining of tremors

## 2020-01-23 NOTE — PROGRESS NOTES
Monitor Summary   Rhythm/Rate:SR/ST   Ectopy:non sustained tachy into the 160's   0.14/0.08/0.34

## 2020-01-23 NOTE — PROGRESS NOTES
Pt admitted to T813-2 at 1750. Pt oriented to room, unit, and plan of care. Pt attached to monitor. All questions answered at this time. Call light within reach, bed alarm on.

## 2020-01-23 NOTE — PROGRESS NOTES
Assumed care of patient and received bedside report from FLOR Jaimes. Patient currently withdrawing and in a lap belt, able to show release. Bed alarm is on. Patient is having increased hallucinations, tremors, and agitation. Overnight hospitalist paged and a one time dose of 4 mg IV ativan was ordered. Last CIWA was 31. Dr. Kip Pan notified by this RN.

## 2020-01-23 NOTE — PROGRESS NOTES
Hospital Medicine Daily Progress Note    Date of Service  1/23/2020    Chief Complaint  54 y.o. female admitted 1/22/2020 with edema and alcohol withdrawal    Hospital Course          Interval Problem Update    Received total of 21 mg of lorazepam overnight CIWA was 31 earlier 27 currently  Patient is confused with tremors  Denies pain  Complains of generalized edema    Consultants/Specialty  None    Code Status  Full code    Disposition  To be determined    Review of Systems  Review of Systems   Unable to perform ROS: Mental status change        Physical Exam  Temp:  [36.4 °C (97.5 °F)-37.2 °C (98.9 °F)] 36.7 °C (98 °F)  Pulse:  [] 95  Resp:  [16-20] 18  BP: (107-125)/(68-87) 125/80  SpO2:  [93 %-96 %] 95 %    Physical Exam  Vitals signs and nursing note reviewed.   Constitutional:       General: She is not in acute distress.     Appearance: She is diaphoretic.   HENT:      Head: Normocephalic and atraumatic.      Nose: Nose normal.      Mouth/Throat:      Pharynx: No oropharyngeal exudate or posterior oropharyngeal erythema.   Eyes:      General:         Right eye: No discharge.         Left eye: No discharge.   Neck:      Musculoskeletal: Neck supple.   Cardiovascular:      Rate and Rhythm: Regular rhythm. Tachycardia present.      Heart sounds: No murmur. No friction rub. No gallop.    Pulmonary:      Effort: Pulmonary effort is normal. No respiratory distress.      Breath sounds: No stridor. No rhonchi or rales.   Chest:      Chest wall: No tenderness.   Abdominal:      General: Bowel sounds are normal. There is no distension.      Palpations: Abdomen is soft. There is no mass.      Tenderness: There is no tenderness. There is no guarding.   Musculoskeletal:         General: Swelling present. No tenderness.   Skin:     General: Skin is warm.      Coloration: Skin is not cyanotic.      Nails: There is no clubbing.     Neurological:      General: No focal deficit present.      Mental Status: She is alert.       Cranial Nerves: No cranial nerve deficit.      Motor: No weakness.      Comments: Oriented x2   Psychiatric:         Cognition and Memory: She exhibits impaired recent memory.      Comments: Impulsive           Fluids  No intake or output data in the 24 hours ending 01/23/20 1457    Laboratory  Recent Labs     01/22/20  1231 01/23/20  0033   WBC 4.3* 5.1   RBC 4.49 4.26   HEMOGLOBIN 14.5 13.4   HEMATOCRIT 43.0 39.7   MCV 95.8 93.2   MCH 32.3 31.5   MCHC 33.7 33.8   RDW 62.4* 61.1*   PLATELETCT 108* 95*   MPV 9.6 9.8     Recent Labs     01/22/20  1231 01/23/20  0033   SODIUM 146* 137   POTASSIUM 3.2* 3.5*   CHLORIDE 99 93*   CO2 31 29   GLUCOSE 109* 112*   BUN 9 9   CREATININE 0.77 0.65   CALCIUM 9.5 9.0                   Imaging  CT-ABDOMEN-PELVIS WITH   Final Result      1.  Marked hepatic steatosis.   2.  Hepatomegaly.   3.  Status post cholecystectomy.   4.  Postsurgical changes of the distal esophagus.   5.  Small hiatal hernia.   6.  Colonic diverticulosis without evidence of diverticulitis.   7.  Status post hysterectomy.      DX-CHEST-PORTABLE (1 VIEW)   Final Result      No acute cardiopulmonary disease evident.      US-EXTREMITY VENOUS LOWER BILAT    (Results Pending)        Assessment/Plan   Alcohol withdrawal (HCC)  With alcohol dependence    Will start on tapering dose of Librium  Continue lorazepam per CIWA protocol  Thiamine and folate supplements  Aspiration precautions and fall precautions  Continue close clinical monitoring if worsening withdrawal symptoms despite those measures will transfer to ICU  Discussed with nursing staff      patient on alcohol cessation when mental status improved    Elevated liver enzymes  Likely secondary to alcoholic hepatitis and hepatic steatosis  Abdominal exam is benign  CT abdomen reviewed with no acute pathology    Hypernatremia  Improved with hydration monitor    Hypokalemia  Replete and monitor levels    Monitor magnesium and phosphorus    Leg  swelling  Likely secondary to dependent edema from her obesity  BNP is normal  Follow-up on lower extremity duplex          VTE prophylaxis: Lovenox

## 2020-01-23 NOTE — CARE PLAN
Problem: Communication  Goal: The ability to communicate needs accurately and effectively will improve  Outcome: PROGRESSING AS EXPECTED  Patient educated on use of call light. Patient verbalized understanding. Call light within reach. No other needs at this time. Hourly rounding in place, will continue to monitor.       Problem: Safety  Goal: Will remain free from falls  Outcome: PROGRESSING AS EXPECTED  Patient educated on level of risk.  Patient verbalized understanding. Bed is locked in lowest position. Bed alarm is on. Call light within reach. No other needs at this time. Will continue to monitor.

## 2020-01-23 NOTE — PROGRESS NOTES
Patient having increased hallucinations and agitation. Dr. Marilin teixeira. Received orders for 4mg IV ativan once PRN.

## 2020-01-23 NOTE — PROGRESS NOTES
2 RN Skin Check    2 RN skin check complete w/ Dotty RN.   Devices in place: peripheral IV and tele box.  Skin assessed under devices: yes.  Confirmed pressure ulcers found on: N/A.  New potential pressure ulcers noted on N/A. Wound consult placed No.  The following interventions in place Pillows, Lotion and patinet can turn from side to side.    Ears: intact  Elbows: pink, blanching  Breasts/pannus: intact  Sacrum: intact  Legs/ feet: swelling, dry

## 2020-01-23 NOTE — PROGRESS NOTES
Assumed care of patient, bedside report received from Amelie RAY. Updated on POC, call light within reach and fall precautions in place. Bed locked and in lowest position. Patient instructed to call for assistance before getting out of bed. All questions answered, no other needs at this time.

## 2020-01-23 NOTE — ED NOTES
Pharmacy Medication Reconciliation      Medication reconciliation updated and complete per pt at bedside  Allergies have been verified and updated   No oral ABX within the last 14 days  Pt home pharmacy:[facility]               no

## 2020-01-24 PROBLEM — E83.42 HYPOMAGNESEMIA: Status: ACTIVE | Noted: 2020-01-24

## 2020-01-24 PROBLEM — D69.6 THROMBOCYTOPENIA (HCC): Status: ACTIVE | Noted: 2020-01-24

## 2020-01-24 PROBLEM — E83.39 HYPOPHOSPHATEMIA: Status: ACTIVE | Noted: 2020-01-24

## 2020-01-24 LAB
ALBUMIN SERPL BCP-MCNC: 3.7 G/DL (ref 3.2–4.9)
ALBUMIN/GLOB SERPL: 1 G/DL
ALP SERPL-CCNC: 91 U/L (ref 30–99)
ALT SERPL-CCNC: 83 U/L (ref 2–50)
ANION GAP SERPL CALC-SCNC: 11 MMOL/L (ref 0–11.9)
AST SERPL-CCNC: 177 U/L (ref 12–45)
BASOPHILS # BLD AUTO: 1.1 % (ref 0–1.8)
BASOPHILS # BLD: 0.04 K/UL (ref 0–0.12)
BILIRUB SERPL-MCNC: 2.2 MG/DL (ref 0.1–1.5)
BUN SERPL-MCNC: 7 MG/DL (ref 8–22)
CALCIUM SERPL-MCNC: 9.2 MG/DL (ref 8.5–10.5)
CHLORIDE SERPL-SCNC: 104 MMOL/L (ref 96–112)
CO2 SERPL-SCNC: 21 MMOL/L (ref 20–33)
CREAT SERPL-MCNC: 0.68 MG/DL (ref 0.5–1.4)
EOSINOPHIL # BLD AUTO: 0.13 K/UL (ref 0–0.51)
EOSINOPHIL NFR BLD: 3.6 % (ref 0–6.9)
ERYTHROCYTE [DISTWIDTH] IN BLOOD BY AUTOMATED COUNT: 61.9 FL (ref 35.9–50)
GLOBULIN SER CALC-MCNC: 3.6 G/DL (ref 1.9–3.5)
GLUCOSE SERPL-MCNC: 107 MG/DL (ref 65–99)
HCT VFR BLD AUTO: 36.8 % (ref 37–47)
HGB BLD-MCNC: 12.6 G/DL (ref 12–16)
IMM GRANULOCYTES # BLD AUTO: 0.01 K/UL (ref 0–0.11)
IMM GRANULOCYTES NFR BLD AUTO: 0.3 % (ref 0–0.9)
LYMPHOCYTES # BLD AUTO: 0.98 K/UL (ref 1–4.8)
LYMPHOCYTES NFR BLD: 26.8 % (ref 22–41)
MAGNESIUM SERPL-MCNC: 1.4 MG/DL (ref 1.5–2.5)
MCH RBC QN AUTO: 32.8 PG (ref 27–33)
MCHC RBC AUTO-ENTMCNC: 34.2 G/DL (ref 33.6–35)
MCV RBC AUTO: 95.8 FL (ref 81.4–97.8)
MONOCYTES # BLD AUTO: 0.43 K/UL (ref 0–0.85)
MONOCYTES NFR BLD AUTO: 11.8 % (ref 0–13.4)
NEUTROPHILS # BLD AUTO: 2.06 K/UL (ref 2–7.15)
NEUTROPHILS NFR BLD: 56.4 % (ref 44–72)
NRBC # BLD AUTO: 0 K/UL
NRBC BLD-RTO: 0 /100 WBC
PHOSPHATE SERPL-MCNC: 2.3 MG/DL (ref 2.5–4.5)
PLATELET # BLD AUTO: 68 K/UL (ref 164–446)
PMV BLD AUTO: 10 FL (ref 9–12.9)
POTASSIUM SERPL-SCNC: 4.5 MMOL/L (ref 3.6–5.5)
PROT SERPL-MCNC: 7.3 G/DL (ref 6–8.2)
RBC # BLD AUTO: 3.84 M/UL (ref 4.2–5.4)
SODIUM SERPL-SCNC: 136 MMOL/L (ref 135–145)
WBC # BLD AUTO: 3.7 K/UL (ref 4.8–10.8)

## 2020-01-24 PROCEDURE — 700102 HCHG RX REV CODE 250 W/ 637 OVERRIDE(OP): Performed by: INTERNAL MEDICINE

## 2020-01-24 PROCEDURE — 770020 HCHG ROOM/CARE - TELE (206)

## 2020-01-24 PROCEDURE — A9270 NON-COVERED ITEM OR SERVICE: HCPCS | Performed by: INTERNAL MEDICINE

## 2020-01-24 PROCEDURE — 700111 HCHG RX REV CODE 636 W/ 250 OVERRIDE (IP): Performed by: HOSPITALIST

## 2020-01-24 PROCEDURE — 83735 ASSAY OF MAGNESIUM: CPT

## 2020-01-24 PROCEDURE — 80053 COMPREHEN METABOLIC PANEL: CPT

## 2020-01-24 PROCEDURE — 700111 HCHG RX REV CODE 636 W/ 250 OVERRIDE (IP): Performed by: INTERNAL MEDICINE

## 2020-01-24 PROCEDURE — 36415 COLL VENOUS BLD VENIPUNCTURE: CPT

## 2020-01-24 PROCEDURE — 85025 COMPLETE CBC W/AUTO DIFF WBC: CPT

## 2020-01-24 PROCEDURE — 84100 ASSAY OF PHOSPHORUS: CPT

## 2020-01-24 PROCEDURE — 700102 HCHG RX REV CODE 250 W/ 637 OVERRIDE(OP): Performed by: HOSPITALIST

## 2020-01-24 PROCEDURE — A9270 NON-COVERED ITEM OR SERVICE: HCPCS | Performed by: HOSPITALIST

## 2020-01-24 PROCEDURE — 700105 HCHG RX REV CODE 258: Performed by: INTERNAL MEDICINE

## 2020-01-24 PROCEDURE — 99233 SBSQ HOSP IP/OBS HIGH 50: CPT | Performed by: HOSPITALIST

## 2020-01-24 RX ORDER — MAGNESIUM SULFATE HEPTAHYDRATE 40 MG/ML
4 INJECTION, SOLUTION INTRAVENOUS ONCE
Status: COMPLETED | OUTPATIENT
Start: 2020-01-24 | End: 2020-01-24

## 2020-01-24 RX ADMIN — SODIUM CHLORIDE: 9 INJECTION, SOLUTION INTRAVENOUS at 04:56

## 2020-01-24 RX ADMIN — DIBASIC SODIUM PHOSPHATE, MONOBASIC POTASSIUM PHOSPHATE AND MONOBASIC SODIUM PHOSPHATE 2 TABLET: 852; 155; 130 TABLET ORAL at 11:57

## 2020-01-24 RX ADMIN — SENNOSIDES AND DOCUSATE SODIUM 2 TABLET: 8.6; 5 TABLET ORAL at 04:57

## 2020-01-24 RX ADMIN — Medication 100 MG: at 04:57

## 2020-01-24 RX ADMIN — LORAZEPAM 1 MG: 1 TABLET ORAL at 04:40

## 2020-01-24 RX ADMIN — DIBASIC SODIUM PHOSPHATE, MONOBASIC POTASSIUM PHOSPHATE AND MONOBASIC SODIUM PHOSPHATE 2 TABLET: 852; 155; 130 TABLET ORAL at 17:42

## 2020-01-24 RX ADMIN — MAGNESIUM SULFATE IN WATER 4 G: 40 INJECTION, SOLUTION INTRAVENOUS at 10:41

## 2020-01-24 RX ADMIN — LORAZEPAM 1 MG: 1 TABLET ORAL at 21:35

## 2020-01-24 RX ADMIN — LORAZEPAM 1 MG: 1 TABLET ORAL at 09:01

## 2020-01-24 RX ADMIN — ENOXAPARIN SODIUM 40 MG: 100 INJECTION SUBCUTANEOUS at 04:58

## 2020-01-24 RX ADMIN — FOLIC ACID 1 MG: 1 TABLET ORAL at 04:57

## 2020-01-24 RX ADMIN — CHLORDIAZEPOXIDE HYDROCHLORIDE 25 MG: 25 CAPSULE ORAL at 04:57

## 2020-01-24 RX ADMIN — OMEPRAZOLE 20 MG: 20 CAPSULE, DELAYED RELEASE ORAL at 04:57

## 2020-01-24 RX ADMIN — CHLORDIAZEPOXIDE HYDROCHLORIDE 25 MG: 25 CAPSULE ORAL at 17:41

## 2020-01-24 RX ADMIN — CHLORDIAZEPOXIDE HYDROCHLORIDE 25 MG: 25 CAPSULE ORAL at 11:57

## 2020-01-24 RX ADMIN — THERA TABS 1 TABLET: TAB at 04:57

## 2020-01-24 RX ADMIN — LORAZEPAM 1.5 MG: 2 INJECTION INTRAMUSCULAR; INTRAVENOUS at 08:01

## 2020-01-24 RX ADMIN — LORAZEPAM 1 MG: 1 TABLET ORAL at 15:38

## 2020-01-24 ASSESSMENT — LIFESTYLE VARIABLES
ORIENTATION AND CLOUDING OF SENSORIUM: CANNOT DO SERIAL ADDITIONS OR IS UNCERTAIN ABOUT DATE
TREMOR: TREMOR NOT VISIBLE BUT CAN BE FELT, FINGERTIP TO FINGERTIP
NAUSEA AND VOMITING: *
ORIENTATION AND CLOUDING OF SENSORIUM: DATE DISORIENTATION BY NO MORE THAN TWO CALENDAR DAYS
ORIENTATION AND CLOUDING OF SENSORIUM: DATE DISORIENTATION BY NO MORE THAN TWO CALENDAR DAYS
AUDITORY DISTURBANCES: MILD HARSHNESS OR ABILITY TO FRIGHTEN
NAUSEA AND VOMITING: MILD NAUSEA WITH NO VOMITING
NAUSEA AND VOMITING: *
PAROXYSMAL SWEATS: BEADS OF SWEAT OBVIOUS ON FOREHEAD
ANXIETY: MODERATELY ANXIOUS OR GUARDED, SO ANXIETY IS INFERRED
TREMOR: *
HEADACHE, FULLNESS IN HEAD: NOT PRESENT
NAUSEA AND VOMITING: NO NAUSEA AND NO VOMITING
ORIENTATION AND CLOUDING OF SENSORIUM: ORIENTED AND CAN DO SERIAL ADDITIONS
AUDITORY DISTURBANCES: NOT PRESENT
VISUAL DISTURBANCES: NOT PRESENT
HEADACHE, FULLNESS IN HEAD: VERY SEVERE
TOTAL SCORE: 8
VISUAL DISTURBANCES: NOT PRESENT
NAUSEA AND VOMITING: NO NAUSEA AND NO VOMITING
PAROXYSMAL SWEATS: NO SWEAT VISIBLE
TREMOR: SEVERE TREMOR, EVEN WITH ARMS NOT EXTENDED
ANXIETY: *
AGITATION: NORMAL ACTIVITY
PAROXYSMAL SWEATS: NO SWEAT VISIBLE
VISUAL DISTURBANCES: NOT PRESENT
AUDITORY DISTURBANCES: NOT PRESENT
AGITATION: NORMAL ACTIVITY
ORIENTATION AND CLOUDING OF SENSORIUM: ORIENTED AND CAN DO SERIAL ADDITIONS
TREMOR: *
HEADACHE, FULLNESS IN HEAD: NOT PRESENT
AUDITORY DISTURBANCES: NOT PRESENT
AGITATION: NORMAL ACTIVITY
AUDITORY DISTURBANCES: NOT PRESENT
TOTAL SCORE: 9
AGITATION: NORMAL ACTIVITY
AUDITORY DISTURBANCES: NOT PRESENT
PAROXYSMAL SWEATS: NO SWEAT VISIBLE
VISUAL DISTURBANCES: NOT PRESENT
ORIENTATION AND CLOUDING OF SENSORIUM: CANNOT DO SERIAL ADDITIONS OR IS UNCERTAIN ABOUT DATE
TOTAL SCORE: 8
ANXIETY: MILDLY ANXIOUS
TOTAL SCORE: 4
ANXIETY: MODERATELY ANXIOUS OR GUARDED, SO ANXIETY IS INFERRED
TREMOR: *
ORIENTATION AND CLOUDING OF SENSORIUM: ORIENTED AND CAN DO SERIAL ADDITIONS
TOTAL SCORE: 3
HEADACHE, FULLNESS IN HEAD: NOT PRESENT
TREMOR: TREMOR NOT VISIBLE BUT CAN BE FELT, FINGERTIP TO FINGERTIP
AGITATION: NORMAL ACTIVITY
TREMOR: TREMOR NOT VISIBLE BUT CAN BE FELT, FINGERTIP TO FINGERTIP
AGITATION: SOMEWHAT MORE THAN NORMAL ACTIVITY
ANXIETY: NO ANXIETY (AT EASE)
VISUAL DISTURBANCES: NOT PRESENT
TOTAL SCORE: 8
VISUAL DISTURBANCES: NOT PRESENT
ANXIETY: NO ANXIETY (AT EASE)
PAROXYSMAL SWEATS: NO SWEAT VISIBLE
HEADACHE, FULLNESS IN HEAD: MODERATE
HEADACHE, FULLNESS IN HEAD: MODERATELY SEVERE
NAUSEA AND VOMITING: NO NAUSEA AND NO VOMITING
AUDITORY DISTURBANCES: NOT PRESENT
VISUAL DISTURBANCES: NOT PRESENT
HEADACHE, FULLNESS IN HEAD: VERY MILD
PAROXYSMAL SWEATS: NO SWEAT VISIBLE
ANXIETY: *
AGITATION: NORMAL ACTIVITY
NAUSEA AND VOMITING: NO NAUSEA AND NO VOMITING
PAROXYSMAL SWEATS: NO SWEAT VISIBLE
TOTAL SCORE: 22

## 2020-01-24 NOTE — PROGRESS NOTES
Hospital Medicine Daily Progress Note    Date of Service  1/24/2020    Chief Complaint  54 y.o. female admitted 1/22/2020 with edema and alcohol withdrawal    Hospital Course          Interval Problem Update    CIWA 24 this morning  Denies pain  Anxious  Tolerating diet    Consultants/Specialty  None    Code Status  Full code    Disposition  To be determined    Review of Systems  Review of Systems   Unable to perform ROS: Medical condition        Physical Exam  Temp:  [36.8 °C (98.3 °F)-37.4 °C (99.4 °F)] 37.4 °C (99.4 °F)  Pulse:  [] 98  Resp:  [16-18] 16  BP: (111-135)/(70-96) 135/96  SpO2:  [94 %-96 %] 95 %    Physical Exam  Vitals signs and nursing note reviewed.   Constitutional:       General: She is not in acute distress.  HENT:      Head: Normocephalic and atraumatic.      Nose: Nose normal.      Mouth/Throat:      Pharynx: No oropharyngeal exudate or posterior oropharyngeal erythema.   Eyes:      General:         Right eye: No discharge.         Left eye: No discharge.   Neck:      Musculoskeletal: Neck supple.   Cardiovascular:      Rate and Rhythm: Regular rhythm. Tachycardia present.      Heart sounds: No murmur. No friction rub. No gallop.    Pulmonary:      Effort: Pulmonary effort is normal. No respiratory distress.      Breath sounds: No stridor. No wheezing or rales.   Chest:      Chest wall: No tenderness.   Abdominal:      General: Bowel sounds are normal. There is no distension.      Palpations: Abdomen is soft. There is no mass.      Tenderness: There is no tenderness.   Musculoskeletal:         General: Swelling present. No tenderness.   Skin:     General: Skin is warm and dry.      Coloration: Skin is not cyanotic.      Nails: There is no clubbing.     Neurological:      General: No focal deficit present.      Mental Status: She is alert and oriented to person, place, and time.      Cranial Nerves: No cranial nerve deficit.      Motor: No weakness.   Psychiatric:         Mood and Affect:  Mood is anxious.         Judgment: Judgment is impulsive.         Fluids    Intake/Output Summary (Last 24 hours) at 1/24/2020 1150  Last data filed at 1/24/2020 0815  Gross per 24 hour   Intake 240 ml   Output --   Net 240 ml       Laboratory  Recent Labs     01/22/20  1231 01/23/20  0033   WBC 4.3* 5.1   RBC 4.49 4.26   HEMOGLOBIN 14.5 13.4   HEMATOCRIT 43.0 39.7   MCV 95.8 93.2   MCH 32.3 31.5   MCHC 33.7 33.8   RDW 62.4* 61.1*   PLATELETCT 108* 95*   MPV 9.6 9.8     Recent Labs     01/22/20  1231 01/23/20  0033 01/24/20  0213   SODIUM 146* 137 136   POTASSIUM 3.2* 3.5* 4.5   CHLORIDE 99 93* 104   CO2 31 29 21   GLUCOSE 109* 112* 107*   BUN 9 9 7*   CREATININE 0.77 0.65 0.68   CALCIUM 9.5 9.0 9.2                   Imaging  US-EXTREMITY VENOUS LOWER BILAT   Final Result      CT-ABDOMEN-PELVIS WITH   Final Result      1.  Marked hepatic steatosis.   2.  Hepatomegaly.   3.  Status post cholecystectomy.   4.  Postsurgical changes of the distal esophagus.   5.  Small hiatal hernia.   6.  Colonic diverticulosis without evidence of diverticulitis.   7.  Status post hysterectomy.      DX-CHEST-PORTABLE (1 VIEW)   Final Result      No acute cardiopulmonary disease evident.           Assessment/Plan   Alcohol withdrawal (HCC)  With alcohol dependence    Continue tapering dose of Librium  Continue lorazepam per CIWA protocol  Continue close clinical monitoring and aspiration precautions  Thiamine and folate  Monitor and replete electrolytes  Counseling on cessation when mental status improved    Elevated liver enzymes  CT with hepatic steatosis  Elevated LFTs likely related to alcoholic hepatitis  Continue to monitor  Bilirubin trending up  Abdominal exam is benign    Hypernatremia  Resolved with hydration    Leg swelling  Likely dependent edema  BNP is normal  Lower extremity duplex reviewed negative for DVT    Hypomagnesemia  Replete with IV magnesium sulfate and monitor    Hypophosphatemia  Replete with K-Phos and  monitor    Thrombocytopenia (HCC)  Likely related to her alcohol withdrawal, toxicity from alcoholism  No signs of bleeding at this time monitor CBC          VTE prophylaxis: Lovenox

## 2020-01-24 NOTE — DISCHARGE PLANNING
Care Transition Team Assessment  LSW met with pt at bedside to complete assessment and discuss discharge plans/barriers. Pt was sleeping; significant other, Anthony at bedside and provided information for assessment. Significant other verified information on Facesheet and reported pt uses [facility] off of [address].    Pt is 54 year old  female who lives with her significant other of 3 years Anthony. He reported lately pt has not been getting out of bed or meeting her ADL/iADL needs due to excessive drinking. He reported pt has been drinking 1-3 pints of vodka a day and Familia's. He reported physically pt is independently able to complete all her ADL/iADL needs. He reported pt does not use any DME in the home. He reported pt is currently not working and is reliant on his income. He explained he is retired from the [employer] and receives about $5000/month from his pension but that he recently lost his job and is concerned about financiers and her medical bills. He is interested in determining if she will qualify for Medicaid as she does not have an income. LSW notified PFA and requested PFA to meet with pt.     Discussed substance abuse resources, significant other voiced he does not feel pt will want to follow up with treatment but was open to receiving the resources. LSW provided list of substance abuse treatment facilities and contact information for [facility]. He reported he would review with pt.     Information Source  Orientation : Disoriented to Person, Disoriented to Place  Information Given By: Significant Other  Informant's Name: Anthony  Who is responsible for making decisions for patient? : Patient    Readmission Evaluation  Is this a readmission?: No    Elopement Risk  Legal Hold: No  Ambulatory or Self Mobile in Wheelchair: Yes  Disoriented: No  Psychiatric Symptoms: None  History of Wandering: No  Elopement this Admit: No  Vocalizing Wanting to Leave: No  Displays Behaviors, Body Language Wanting  to Leave: No-Not at Risk for Elopement  Elopement Risk: Not at Risk for Elopement    Interdisciplinary Discharge Planning  Primary Care Physician: Dr. Frazier  Lives with - Patient's Self Care Capacity: Significant Other  Patient or legal guardian wants to designate a caregiver (see row info): No  Support Systems: Spouse / Significant Other  Housing / Facility: [address] [facility]  Patient Expects to be Discharged to:: Home   Durable Medical Equipment: Not Applicable    Discharge Preparedness  What is your plan after discharge?: Home with help  What are your discharge supports?: Other (comment)(Significant other )  Prior Functional Level: Ambulatory, Drives Self, Independent with Activities of Daily Living, Independent with Medication Management  Difficulity with ADLs: None  Difficulity with IADLs: None    Functional Assesment  Prior Functional Level: Ambulatory, Drives Self, Independent with Activities of Daily Living, Independent with Medication Management    Finances  Financial Barriers to Discharge: No  Prescription Coverage: Yes    Vision / Hearing Impairment  Vision Impairment : Yes  Right Eye Vision: Wears Glasses, Impaired  Left Eye Vision: Wears Glasses, Impaired  Hearing Impairment : No    Advance Directive  Advance Directive?: None    Domestic Abuse  Have you ever been the victim of abuse or violence?: No  Physical Abuse or Sexual Abuse: No  Verbal Abuse or Emotional Abuse: No  Possible Abuse Reported to:: Not Applicable    Psychological Assessment  History of Substance Abuse: Alcohol  Date Last Used - Alcohol: 01/22/2020  Substance Abuse Comments: 1-3 halley gomez daily  History of Psychiatric Problems: No    Discharge Risks or Barriers  Discharge risks or barriers?: Uninsured / underinsured, Substance abuse  Patient risk factors: Substance abuse, Uninsured or underinsured    Anticipated Discharge Information  Anticipated discharge disposition: Home  Discharge Address: [address] [place] [address] [place] [address]  Discharge  Contact Phone Number: [phone]

## 2020-01-24 NOTE — CARE PLAN
Problem: Safety  Goal: Will remain free from injury  Outcome: PROGRESSING AS EXPECTED  Lap belt in place. Patient demonstrates ability to release.     Problem: Knowledge Deficit  Goal: Knowledge of disease process/condition, treatment plan, diagnostic tests, and medications will improve  Outcome: PROGRESSING SLOWER THAN EXPECTED  Care plan updates need to be given to the boyfriend d/t patient detoxing and not retaining information.

## 2020-01-25 PROBLEM — E87.0 HYPERNATREMIA: Status: RESOLVED | Noted: 2020-01-22 | Resolved: 2020-01-25

## 2020-01-25 LAB
ALBUMIN SERPL BCP-MCNC: 3.1 G/DL (ref 3.2–4.9)
ALBUMIN/GLOB SERPL: 0.9 G/DL
ALP SERPL-CCNC: 83 U/L (ref 30–99)
ALT SERPL-CCNC: 63 U/L (ref 2–50)
ANION GAP SERPL CALC-SCNC: 13 MMOL/L (ref 0–11.9)
AST SERPL-CCNC: 119 U/L (ref 12–45)
BASOPHILS # BLD AUTO: 1.2 % (ref 0–1.8)
BASOPHILS # BLD: 0.05 K/UL (ref 0–0.12)
BILIRUB SERPL-MCNC: 1.8 MG/DL (ref 0.1–1.5)
BUN SERPL-MCNC: 7 MG/DL (ref 8–22)
CALCIUM SERPL-MCNC: 8.7 MG/DL (ref 8.5–10.5)
CHLORIDE SERPL-SCNC: 107 MMOL/L (ref 96–112)
CO2 SERPL-SCNC: 18 MMOL/L (ref 20–33)
CREAT SERPL-MCNC: 0.59 MG/DL (ref 0.5–1.4)
EOSINOPHIL # BLD AUTO: 0.15 K/UL (ref 0–0.51)
EOSINOPHIL NFR BLD: 3.5 % (ref 0–6.9)
ERYTHROCYTE [DISTWIDTH] IN BLOOD BY AUTOMATED COUNT: 61.5 FL (ref 35.9–50)
GLOBULIN SER CALC-MCNC: 3.3 G/DL (ref 1.9–3.5)
GLUCOSE SERPL-MCNC: 86 MG/DL (ref 65–99)
HCT VFR BLD AUTO: 39.5 % (ref 37–47)
HGB BLD-MCNC: 13.2 G/DL (ref 12–16)
IMM GRANULOCYTES # BLD AUTO: 0.02 K/UL (ref 0–0.11)
IMM GRANULOCYTES NFR BLD AUTO: 0.5 % (ref 0–0.9)
LYMPHOCYTES # BLD AUTO: 0.96 K/UL (ref 1–4.8)
LYMPHOCYTES NFR BLD: 22.2 % (ref 22–41)
MAGNESIUM SERPL-MCNC: 2 MG/DL (ref 1.5–2.5)
MCH RBC QN AUTO: 32 PG (ref 27–33)
MCHC RBC AUTO-ENTMCNC: 33.4 G/DL (ref 33.6–35)
MCV RBC AUTO: 95.6 FL (ref 81.4–97.8)
MONOCYTES # BLD AUTO: 0.51 K/UL (ref 0–0.85)
MONOCYTES NFR BLD AUTO: 11.8 % (ref 0–13.4)
NEUTROPHILS # BLD AUTO: 2.64 K/UL (ref 2–7.15)
NEUTROPHILS NFR BLD: 60.8 % (ref 44–72)
NRBC # BLD AUTO: 0 K/UL
NRBC BLD-RTO: 0 /100 WBC
PHOSPHATE SERPL-MCNC: 3.4 MG/DL (ref 2.5–4.5)
PLATELET # BLD AUTO: 85 K/UL (ref 164–446)
PMV BLD AUTO: 10.7 FL (ref 9–12.9)
POTASSIUM SERPL-SCNC: 3.7 MMOL/L (ref 3.6–5.5)
PROT SERPL-MCNC: 6.4 G/DL (ref 6–8.2)
RBC # BLD AUTO: 4.13 M/UL (ref 4.2–5.4)
SODIUM SERPL-SCNC: 138 MMOL/L (ref 135–145)
WBC # BLD AUTO: 4.3 K/UL (ref 4.8–10.8)

## 2020-01-25 PROCEDURE — 700102 HCHG RX REV CODE 250 W/ 637 OVERRIDE(OP): Performed by: HOSPITALIST

## 2020-01-25 PROCEDURE — A9270 NON-COVERED ITEM OR SERVICE: HCPCS | Performed by: INTERNAL MEDICINE

## 2020-01-25 PROCEDURE — 83735 ASSAY OF MAGNESIUM: CPT

## 2020-01-25 PROCEDURE — 700111 HCHG RX REV CODE 636 W/ 250 OVERRIDE (IP): Performed by: INTERNAL MEDICINE

## 2020-01-25 PROCEDURE — 99232 SBSQ HOSP IP/OBS MODERATE 35: CPT | Performed by: HOSPITALIST

## 2020-01-25 PROCEDURE — 97165 OT EVAL LOW COMPLEX 30 MIN: CPT

## 2020-01-25 PROCEDURE — 85025 COMPLETE CBC W/AUTO DIFF WBC: CPT

## 2020-01-25 PROCEDURE — 700105 HCHG RX REV CODE 258: Performed by: INTERNAL MEDICINE

## 2020-01-25 PROCEDURE — 36415 COLL VENOUS BLD VENIPUNCTURE: CPT

## 2020-01-25 PROCEDURE — 84100 ASSAY OF PHOSPHORUS: CPT

## 2020-01-25 PROCEDURE — 770020 HCHG ROOM/CARE - TELE (206)

## 2020-01-25 PROCEDURE — 700102 HCHG RX REV CODE 250 W/ 637 OVERRIDE(OP): Performed by: INTERNAL MEDICINE

## 2020-01-25 PROCEDURE — A9270 NON-COVERED ITEM OR SERVICE: HCPCS | Performed by: HOSPITALIST

## 2020-01-25 PROCEDURE — 80053 COMPREHEN METABOLIC PANEL: CPT

## 2020-01-25 RX ADMIN — CHLORDIAZEPOXIDE HYDROCHLORIDE 25 MG: 25 CAPSULE ORAL at 00:33

## 2020-01-25 RX ADMIN — DIBASIC SODIUM PHOSPHATE, MONOBASIC POTASSIUM PHOSPHATE AND MONOBASIC SODIUM PHOSPHATE 2 TABLET: 852; 155; 130 TABLET ORAL at 16:35

## 2020-01-25 RX ADMIN — Medication 100 MG: at 06:01

## 2020-01-25 RX ADMIN — LORAZEPAM 0.5 MG: 1 TABLET ORAL at 05:26

## 2020-01-25 RX ADMIN — ENOXAPARIN SODIUM 40 MG: 100 INJECTION SUBCUTANEOUS at 05:10

## 2020-01-25 RX ADMIN — DIBASIC SODIUM PHOSPHATE, MONOBASIC POTASSIUM PHOSPHATE AND MONOBASIC SODIUM PHOSPHATE 2 TABLET: 852; 155; 130 TABLET ORAL at 05:12

## 2020-01-25 RX ADMIN — FOLIC ACID 1 MG: 1 TABLET ORAL at 05:11

## 2020-01-25 RX ADMIN — THERA TABS 1 TABLET: TAB at 16:35

## 2020-01-25 RX ADMIN — THERA TABS 1 TABLET: TAB at 05:11

## 2020-01-25 RX ADMIN — CHLORDIAZEPOXIDE HYDROCHLORIDE 25 MG: 25 CAPSULE ORAL at 05:11

## 2020-01-25 RX ADMIN — OMEPRAZOLE 20 MG: 20 CAPSULE, DELAYED RELEASE ORAL at 05:11

## 2020-01-25 RX ADMIN — SODIUM CHLORIDE: 9 INJECTION, SOLUTION INTRAVENOUS at 00:35

## 2020-01-25 ASSESSMENT — ACTIVITIES OF DAILY LIVING (ADL): TOILETING: INDEPENDENT

## 2020-01-25 ASSESSMENT — LIFESTYLE VARIABLES
VISUAL DISTURBANCES: NOT PRESENT
AUDITORY DISTURBANCES: NOT PRESENT
ANXIETY: NO ANXIETY (AT EASE)
TOTAL SCORE: 3
TOTAL SCORE: 7
SUBSTANCE_ABUSE: 1
AGITATION: NORMAL ACTIVITY
ORIENTATION AND CLOUDING OF SENSORIUM: DATE DISORIENTATION BY NO MORE THAN TWO CALENDAR DAYS
AGITATION: NORMAL ACTIVITY
TREMOR: TREMOR NOT VISIBLE BUT CAN BE FELT, FINGERTIP TO FINGERTIP
AUDITORY DISTURBANCES: NOT PRESENT
HEADACHE, FULLNESS IN HEAD: NOT PRESENT
PAROXYSMAL SWEATS: NO SWEAT VISIBLE
NAUSEA AND VOMITING: NO NAUSEA AND NO VOMITING
TOTAL SCORE: 5
AUDITORY DISTURBANCES: NOT PRESENT
PAROXYSMAL SWEATS: NO SWEAT VISIBLE
NAUSEA AND VOMITING: NO NAUSEA AND NO VOMITING
ANXIETY: NO ANXIETY (AT EASE)
AUDITORY DISTURBANCES: NOT PRESENT
ANXIETY: NO ANXIETY (AT EASE)
HEADACHE, FULLNESS IN HEAD: NOT PRESENT
VISUAL DISTURBANCES: NOT PRESENT
ORIENTATION AND CLOUDING OF SENSORIUM: CANNOT DO SERIAL ADDITIONS OR IS UNCERTAIN ABOUT DATE
PAROXYSMAL SWEATS: NO SWEAT VISIBLE
TREMOR: TREMOR NOT VISIBLE BUT CAN BE FELT, FINGERTIP TO FINGERTIP
ORIENTATION AND CLOUDING OF SENSORIUM: DATE DISORIENTATION BY MORE THAN TWO CALENDAR DAYS
PAROXYSMAL SWEATS: NO SWEAT VISIBLE
TREMOR: *
ORIENTATION AND CLOUDING OF SENSORIUM: DATE DISORIENTATION BY NO MORE THAN TWO CALENDAR DAYS
VISUAL DISTURBANCES: NOT PRESENT
TREMOR: TREMOR NOT VISIBLE BUT CAN BE FELT, FINGERTIP TO FINGERTIP
ORIENTATION AND CLOUDING OF SENSORIUM: DATE DISORIENTATION BY NO MORE THAN TWO CALENDAR DAYS
HEADACHE, FULLNESS IN HEAD: MILD
PAROXYSMAL SWEATS: NO SWEAT VISIBLE
TOTAL SCORE: 4
AGITATION: NORMAL ACTIVITY
AGITATION: NORMAL ACTIVITY
AUDITORY DISTURBANCES: NOT PRESENT
ANXIETY: NO ANXIETY (AT EASE)
ANXIETY: NO ANXIETY (AT EASE)
HEADACHE, FULLNESS IN HEAD: NOT PRESENT
NAUSEA AND VOMITING: NO NAUSEA AND NO VOMITING
TREMOR: *
TOTAL SCORE: 2
AGITATION: NORMAL ACTIVITY
TOTAL SCORE: 6
PAROXYSMAL SWEATS: NO SWEAT VISIBLE
NAUSEA AND VOMITING: MILD NAUSEA WITH NO VOMITING
AGITATION: NORMAL ACTIVITY
HEADACHE, FULLNESS IN HEAD: NOT PRESENT
VISUAL DISTURBANCES: NOT PRESENT
NAUSEA AND VOMITING: NO NAUSEA AND NO VOMITING
TREMOR: TREMOR NOT VISIBLE BUT CAN BE FELT, FINGERTIP TO FINGERTIP
VISUAL DISTURBANCES: NOT PRESENT
HEADACHE, FULLNESS IN HEAD: NOT PRESENT
ANXIETY: *
VISUAL DISTURBANCES: NOT PRESENT
NAUSEA AND VOMITING: MILD NAUSEA WITH NO VOMITING
ORIENTATION AND CLOUDING OF SENSORIUM: CANNOT DO SERIAL ADDITIONS OR IS UNCERTAIN ABOUT DATE
AUDITORY DISTURBANCES: NOT PRESENT

## 2020-01-25 ASSESSMENT — COGNITIVE AND FUNCTIONAL STATUS - GENERAL
DRESSING REGULAR LOWER BODY CLOTHING: A LOT
DRESSING REGULAR UPPER BODY CLOTHING: A LITTLE
EATING MEALS: A LITTLE
DAILY ACTIVITIY SCORE: 15
PERSONAL GROOMING: A LITTLE
TOILETING: A LOT
HELP NEEDED FOR BATHING: A LOT
SUGGESTED CMS G CODE MODIFIER DAILY ACTIVITY: CK

## 2020-01-25 ASSESSMENT — ENCOUNTER SYMPTOMS
NERVOUS/ANXIOUS: 1
VOMITING: 0
CHILLS: 0
ABDOMINAL PAIN: 0
FEVER: 0
PALPITATIONS: 0
NAUSEA: 0

## 2020-01-25 NOTE — THERAPY
"Occupational Therapy Evaluation completed.   Functional Status: Pt is a 55 y/o female admitted with SOB found to be in etoh withdrawal. She was pleasant and cooperative, however with impaired insight into deficits. Mariluz Bed mobility. Mariluz sit<>stand. Min-modA functional mobility w/ HHA. ModA LB dressing. Mariluz toileting/toilet txf. Mariluz grooming in stance at the sink. Pt with frequent LOB posteriorly in the bathroom requiring steadying assist from therapist. She is limited by weakness, fatigue, impaired balance, and impaired cognition which impacts independence in self care and functional mobility.  Plan of Care: Will benefit from Occupational Therapy 3 times per week  Discharge Recommendations:  Equipment: Will Continue to Assess for Equipment Needs. At this time, Recommend post-acute placement for additional occupational therapy services prior to discharge home. Patient can tolerate post-acute therapies at a 5x/week frequency.      See \"Rehab Therapy-Acute\" Patient Summary Report for complete documentation.    "

## 2020-01-25 NOTE — PROGRESS NOTES
Report received from day shift RN. Patient sleeping in bed and in no apparent distress. [facility] protocol in place. Will continue to monitor patient.

## 2020-01-26 LAB
ANION GAP SERPL CALC-SCNC: 11 MMOL/L (ref 0–11.9)
BASOPHILS # BLD AUTO: 0.7 % (ref 0–1.8)
BASOPHILS # BLD: 0.03 K/UL (ref 0–0.12)
BUN SERPL-MCNC: 6 MG/DL (ref 8–22)
CALCIUM SERPL-MCNC: 9 MG/DL (ref 8.5–10.5)
CHLORIDE SERPL-SCNC: 105 MMOL/L (ref 96–112)
CO2 SERPL-SCNC: 24 MMOL/L (ref 20–33)
CREAT SERPL-MCNC: 0.68 MG/DL (ref 0.5–1.4)
EOSINOPHIL # BLD AUTO: 0.14 K/UL (ref 0–0.51)
EOSINOPHIL NFR BLD: 3.1 % (ref 0–6.9)
ERYTHROCYTE [DISTWIDTH] IN BLOOD BY AUTOMATED COUNT: 61.8 FL (ref 35.9–50)
GLUCOSE SERPL-MCNC: 128 MG/DL (ref 65–99)
HCT VFR BLD AUTO: 37.5 % (ref 37–47)
HGB BLD-MCNC: 12.5 G/DL (ref 12–16)
IMM GRANULOCYTES # BLD AUTO: 0.02 K/UL (ref 0–0.11)
IMM GRANULOCYTES NFR BLD AUTO: 0.4 % (ref 0–0.9)
LYMPHOCYTES # BLD AUTO: 0.72 K/UL (ref 1–4.8)
LYMPHOCYTES NFR BLD: 16.1 % (ref 22–41)
MCH RBC QN AUTO: 32.4 PG (ref 27–33)
MCHC RBC AUTO-ENTMCNC: 33.3 G/DL (ref 33.6–35)
MCV RBC AUTO: 97.2 FL (ref 81.4–97.8)
MONOCYTES # BLD AUTO: 0.57 K/UL (ref 0–0.85)
MONOCYTES NFR BLD AUTO: 12.8 % (ref 0–13.4)
NEUTROPHILS # BLD AUTO: 2.98 K/UL (ref 2–7.15)
NEUTROPHILS NFR BLD: 66.9 % (ref 44–72)
NRBC # BLD AUTO: 0 K/UL
NRBC BLD-RTO: 0 /100 WBC
PLATELET # BLD AUTO: 93 K/UL (ref 164–446)
PMV BLD AUTO: 9.7 FL (ref 9–12.9)
POTASSIUM SERPL-SCNC: 3.1 MMOL/L (ref 3.6–5.5)
RBC # BLD AUTO: 3.86 M/UL (ref 4.2–5.4)
SODIUM SERPL-SCNC: 140 MMOL/L (ref 135–145)
WBC # BLD AUTO: 4.5 K/UL (ref 4.8–10.8)

## 2020-01-26 PROCEDURE — 85025 COMPLETE CBC W/AUTO DIFF WBC: CPT

## 2020-01-26 PROCEDURE — A9270 NON-COVERED ITEM OR SERVICE: HCPCS | Performed by: HOSPITALIST

## 2020-01-26 PROCEDURE — A9270 NON-COVERED ITEM OR SERVICE: HCPCS | Performed by: INTERNAL MEDICINE

## 2020-01-26 PROCEDURE — 770020 HCHG ROOM/CARE - TELE (206)

## 2020-01-26 PROCEDURE — 700102 HCHG RX REV CODE 250 W/ 637 OVERRIDE(OP): Performed by: HOSPITALIST

## 2020-01-26 PROCEDURE — 80048 BASIC METABOLIC PNL TOTAL CA: CPT

## 2020-01-26 PROCEDURE — 700111 HCHG RX REV CODE 636 W/ 250 OVERRIDE (IP): Performed by: INTERNAL MEDICINE

## 2020-01-26 PROCEDURE — 700102 HCHG RX REV CODE 250 W/ 637 OVERRIDE(OP): Performed by: INTERNAL MEDICINE

## 2020-01-26 PROCEDURE — 97161 PT EVAL LOW COMPLEX 20 MIN: CPT

## 2020-01-26 PROCEDURE — 36415 COLL VENOUS BLD VENIPUNCTURE: CPT

## 2020-01-26 PROCEDURE — 99232 SBSQ HOSP IP/OBS MODERATE 35: CPT | Performed by: HOSPITALIST

## 2020-01-26 RX ORDER — POTASSIUM CHLORIDE 20 MEQ/1
40 TABLET, EXTENDED RELEASE ORAL 3 TIMES DAILY
Status: COMPLETED | OUTPATIENT
Start: 2020-01-26 | End: 2020-01-27

## 2020-01-26 RX ADMIN — POTASSIUM CHLORIDE 40 MEQ: 1500 TABLET, EXTENDED RELEASE ORAL at 12:36

## 2020-01-26 RX ADMIN — ENOXAPARIN SODIUM 40 MG: 100 INJECTION SUBCUTANEOUS at 04:43

## 2020-01-26 RX ADMIN — LORAZEPAM 0.5 MG: 1 TABLET ORAL at 18:17

## 2020-01-26 RX ADMIN — POTASSIUM CHLORIDE 40 MEQ: 1500 TABLET, EXTENDED RELEASE ORAL at 18:07

## 2020-01-26 RX ADMIN — THERA TABS 1 TABLET: TAB at 04:43

## 2020-01-26 RX ADMIN — OMEPRAZOLE 20 MG: 20 CAPSULE, DELAYED RELEASE ORAL at 04:43

## 2020-01-26 ASSESSMENT — LIFESTYLE VARIABLES
TREMOR: TREMOR NOT VISIBLE BUT CAN BE FELT, FINGERTIP TO FINGERTIP
ORIENTATION AND CLOUDING OF SENSORIUM: ORIENTED AND CAN DO SERIAL ADDITIONS
VISUAL DISTURBANCES: NOT PRESENT
NAUSEA AND VOMITING: NO NAUSEA AND NO VOMITING
AGITATION: NORMAL ACTIVITY
TOTAL SCORE: 3
ORIENTATION AND CLOUDING OF SENSORIUM: ORIENTED AND CAN DO SERIAL ADDITIONS
TOTAL SCORE: 3
HEADACHE, FULLNESS IN HEAD: NOT PRESENT
HEADACHE, FULLNESS IN HEAD: NOT PRESENT
AUDITORY DISTURBANCES: NOT PRESENT
HEADACHE, FULLNESS IN HEAD: NOT PRESENT
TOTAL SCORE: 6
ANXIETY: NO ANXIETY (AT EASE)
ANXIETY: MILDLY ANXIOUS
HEADACHE, FULLNESS IN HEAD: VERY MILD
PAROXYSMAL SWEATS: NO SWEAT VISIBLE
ORIENTATION AND CLOUDING OF SENSORIUM: ORIENTED AND CAN DO SERIAL ADDITIONS
AUDITORY DISTURBANCES: NOT PRESENT
AUDITORY DISTURBANCES: NOT PRESENT
NAUSEA AND VOMITING: NO NAUSEA AND NO VOMITING
AGITATION: NORMAL ACTIVITY
VISUAL DISTURBANCES: NOT PRESENT
NAUSEA AND VOMITING: NO NAUSEA AND NO VOMITING
TREMOR: TREMOR NOT VISIBLE BUT CAN BE FELT, FINGERTIP TO FINGERTIP
TOTAL SCORE: 3
AGITATION: SOMEWHAT MORE THAN NORMAL ACTIVITY
ORIENTATION AND CLOUDING OF SENSORIUM: CANNOT DO SERIAL ADDITIONS OR IS UNCERTAIN ABOUT DATE
AUDITORY DISTURBANCES: NOT PRESENT
TOTAL SCORE: 3
VISUAL DISTURBANCES: NOT PRESENT
AGITATION: SOMEWHAT MORE THAN NORMAL ACTIVITY
NAUSEA AND VOMITING: MILD NAUSEA WITH NO VOMITING
NAUSEA AND VOMITING: NO NAUSEA AND NO VOMITING
TREMOR: TREMOR NOT VISIBLE BUT CAN BE FELT, FINGERTIP TO FINGERTIP
PAROXYSMAL SWEATS: NO SWEAT VISIBLE
TREMOR: TREMOR NOT VISIBLE BUT CAN BE FELT, FINGERTIP TO FINGERTIP
HEADACHE, FULLNESS IN HEAD: NOT PRESENT
ANXIETY: *
PAROXYSMAL SWEATS: NO SWEAT VISIBLE
ANXIETY: MILDLY ANXIOUS
TOTAL SCORE: 2
VISUAL DISTURBANCES: NOT PRESENT
ORIENTATION AND CLOUDING OF SENSORIUM: CANNOT DO SERIAL ADDITIONS OR IS UNCERTAIN ABOUT DATE
VISUAL DISTURBANCES: NOT PRESENT
PAROXYSMAL SWEATS: NO SWEAT VISIBLE
AGITATION: NORMAL ACTIVITY
HEADACHE, FULLNESS IN HEAD: NOT PRESENT
PAROXYSMAL SWEATS: NO SWEAT VISIBLE
ANXIETY: MILDLY ANXIOUS
AGITATION: SOMEWHAT MORE THAN NORMAL ACTIVITY
AUDITORY DISTURBANCES: NOT PRESENT
ORIENTATION AND CLOUDING OF SENSORIUM: CANNOT DO SERIAL ADDITIONS OR IS UNCERTAIN ABOUT DATE
SUBSTANCE_ABUSE: 1
ANXIETY: MILDLY ANXIOUS
PAROXYSMAL SWEATS: NO SWEAT VISIBLE
NAUSEA AND VOMITING: NO NAUSEA AND NO VOMITING
VISUAL DISTURBANCES: NOT PRESENT
AUDITORY DISTURBANCES: NOT PRESENT

## 2020-01-26 ASSESSMENT — COGNITIVE AND FUNCTIONAL STATUS - GENERAL
SUGGESTED CMS G CODE MODIFIER MOBILITY: CH
MOBILITY SCORE: 24

## 2020-01-26 ASSESSMENT — ENCOUNTER SYMPTOMS
FEVER: 0
NERVOUS/ANXIOUS: 1
PALPITATIONS: 0
CHILLS: 0

## 2020-01-26 ASSESSMENT — GAIT ASSESSMENTS
DISTANCE (FEET): 250
GAIT LEVEL OF ASSIST: SUPERVISED

## 2020-01-26 NOTE — PROGRESS NOTES
[facility] Medicine Daily Progress Note    Date of Service  1/25/2020    Chief Complaint  54 y.o. female admitted 1/22/2020 with edema and alcohol withdrawal    Hospital Course          Interval Problem Update    Generalized weakness  Impulsive at times  CIWA 3-6  Tolerating p.o. intake however reports poor appetite      Consultants/Specialty  None    Code Status  Full code    Disposition  To be determined    Review of Systems  Review of Systems   Constitutional: Negative for chills and fever.   Cardiovascular: Negative for chest pain and palpitations.   Gastrointestinal: Negative for abdominal pain, nausea and vomiting.   Psychiatric/Behavioral: Positive for substance abuse. The patient is nervous/anxious.    All other systems reviewed and are negative.       Physical Exam  Temp:  [36 °C (96.8 °F)-36.8 °C (98.3 °F)] 36 °C (96.8 °F)  Pulse:  [] 89  Resp:  [14-16] 14  BP: (104-146)/(68-98) 104/68  SpO2:  [93 %-98 %] 98 %    Physical Exam  Vitals signs and nursing note reviewed.   Constitutional:       General: She is not in acute distress.  HENT:      Head: Normocephalic and atraumatic.      Nose: Nose normal. No rhinorrhea.      Mouth/Throat:      Pharynx: No oropharyngeal exudate or posterior oropharyngeal erythema.   Eyes:      General: No scleral icterus.        Right eye: No discharge.         Left eye: No discharge.   Neck:      Musculoskeletal: Neck supple. No neck rigidity.   Cardiovascular:      Rate and Rhythm: Normal rate and regular rhythm.      Heart sounds: Normal heart sounds. No murmur. No friction rub. No gallop.    Pulmonary:      Effort: Pulmonary effort is normal. No respiratory distress.      Breath sounds: Normal breath sounds. No stridor. No wheezing, rhonchi or rales.   Chest:      Chest wall: No tenderness.   Abdominal:      General: Bowel sounds are normal. There is no distension.      Palpations: Abdomen is soft. There is no mass.      Tenderness: There is no tenderness. There is no  rebound.   Musculoskeletal:         General: Swelling present. No tenderness.   Skin:     General: Skin is warm and dry.      Coloration: Skin is not cyanotic or jaundiced.      Nails: There is no clubbing.     Neurological:      General: No focal deficit present.      Mental Status: She is alert and oriented to person, place, and time.      Cranial Nerves: No cranial nerve deficit.      Motor: No weakness.   Psychiatric:         Mood and Affect: Mood is anxious.         Judgment: Judgment is impulsive.         Fluids    Intake/Output Summary (Last 24 hours) at 1/25/2020 1607  Last data filed at 1/25/2020 0800  Gross per 24 hour   Intake 110 ml   Output 500 ml   Net -390 ml       Laboratory  Recent Labs     01/23/20  0033 01/24/20  1141 01/25/20  0928   WBC 5.1 3.7* 4.3*   RBC 4.26 3.84* 4.13*   HEMOGLOBIN 13.4 12.6 13.2   HEMATOCRIT 39.7 36.8* 39.5   MCV 93.2 95.8 95.6   MCH 31.5 32.8 32.0   MCHC 33.8 34.2 33.4*   RDW 61.1* 61.9* 61.5*   PLATELETCT 95* 68* 85*   MPV 9.8 10.0 10.7     Recent Labs     01/23/20  0033 01/24/20  0213 01/25/20  0344   SODIUM 137 136 138   POTASSIUM 3.5* 4.5 3.7   CHLORIDE 93* 104 107   CO2 29 21 18*   GLUCOSE 112* 107* 86   BUN 9 7* 7*   CREATININE 0.65 0.68 0.59   CALCIUM 9.0 9.2 8.7                   Imaging  US-EXTREMITY VENOUS LOWER BILAT   Final Result      CT-ABDOMEN-PELVIS WITH   Final Result      1.  Marked hepatic steatosis.   2.  Hepatomegaly.   3.  Status post cholecystectomy.   4.  Postsurgical changes of the distal esophagus.   5.  Small hiatal hernia.   6.  Colonic diverticulosis without evidence of diverticulitis.   7.  Status post hysterectomy.      DX-CHEST-PORTABLE (1 VIEW)   Final Result      No acute cardiopulmonary disease evident.           Assessment/Plan   Alcohol withdrawal (HCC)  With alcohol dependence    Improved alcohol withdrawal symptoms will discontinue Librium  Continue PRN lorazepam per CIWA protocol  Continue thiamine and folate  Patient counseled on  cessation    Elevated liver enzymes  CT with hepatic steatosis    LFTs improved reinforced importance of abstaining from alcohol    Hypernatremia  Resolved with hydration    Leg swelling  Likely dependent edema  BNP was normal  Lower extremity duplex negative for DVT    Hypophosphatemia  Repleted    Thrombocytopenia (HCC)  Likely related to bone marrow toxicity from alcoholism    Platelet counts improving continue to monitor    Hypokalemia  Replete and monitor      PT OT eval's  Plan of care reviewed with patient and discussed with nursing staff and pharmacist    VTE prophylaxis: Lovenox

## 2020-01-26 NOTE — DISCHARGE PLANNING
[facility] Rehabilitation Transitional Care Coordination     Referral from:  Dr. Kip Naranjo   Facesheet indicates: Access to health care   Potential Rehab Diagnosis: Edema and CIWA         Physiatry consultation Denied per protocol.      Current documentation does not support therapy /medical need for IRF level of care. Anticipate home with outpatient versus skilled nursing when medically cleared.       Thank you for the referral.

## 2020-01-26 NOTE — PROGRESS NOTES
Received bedside report from FLOR Elizabeth. POC discussed. First assessment completed, call light within reach. Fall precautions within place. Patient resting in bed, significant other bedside. Will continue to monitor.

## 2020-01-26 NOTE — DISCHARGE INSTRUCTIONS
Discharge Instructions    Discharged to home by car with relative. Discharged via wheelchair, hospital escort: Yes.  Special equipment needed: Not Applicable    Be sure to schedule a follow-up appointment with your primary care doctor or any specialists as instructed.     Discharge Plan:   Diet Plan: Discussed  Activity Level: Discussed  Confirmed Follow up Appointment: Appointment Scheduled  Confirmed Symptoms Management: Discussed  Medication Reconciliation Updated: Yes  Influenza Vaccine Indication: Not indicated: Previously immunized this influenza season and > 8 years of age    I understand that a diet low in cholesterol, fat, and sodium is recommended for good health. Unless I have been given specific instructions below for another diet, I accept this instruction as my diet prescription.       Special Instructions: None    · Is patient discharged on Warfarin / Coumadin?   No     Depression / Suicide Risk    As you are discharged from this [facility] facility, it is important to learn how to keep safe from harming yourself.    Recognize the warning signs:  · Abrupt changes in personality, positive or negative- including increase in energy   · Giving away possessions  · Change in eating patterns- significant weight changes-  positive or negative  · Change in sleeping patterns- unable to sleep or sleeping all the time   · Unwillingness or inability to communicate  · Depression  · Unusual sadness, discouragement and loneliness  · Talk of wanting to die  · Neglect of personal appearance   · Rebelliousness- reckless behavior  · Withdrawal from people/activities they love  · Confusion- inability to concentrate     If you or a loved one observes any of these behaviors or has concerns about self-harm, here's what you can do:  · Talk about it- your feelings and reasons for harming yourself  · Remove any means that you might use to hurt yourself (examples: pills, rope, extension cords, firearm)  · Get professional  help from the community (Mental Health, Substance Abuse, psychological counseling)  · Do not be alone:Call your Safe Contact- someone whom you trust who will be there for you.  · Call your local CRISIS HOTLINE 492-2196 or 701-091-3677  · Call your local Children's Mobile Crisis Response Team [place] (112) 717-9855 or www.Publicfast  · Call the toll free National Suicide Prevention Hotlines   · National Suicide Prevention Lifeline 583-915-PHTB (6600)  · National Hope Line Network 800-SUICIDE (027-8048)    Alcohol Withdrawal  Alcohol withdrawal is a group of symptoms that can develop when a person who drinks heavily and regularly stops drinking or drinks less.  What are the causes?  Heavy and regular drinking can cause chemicals that send signals from the brain to the body (neurotransmitters) to deactivate. Alcohol withdrawal develops when deactivated neurotransmitters reactivate because a person stops drinking or drinks less.  What increases the risk?  The more a person drinks and the longer he or she drinks, the greater the risk of alcohol withdrawal. Severe withdrawal is more likely to develop in someone who:  · Had severe alcohol withdrawal in the past.  · Had a seizure during a previous episode of alcohol withdrawal.  · Is elderly.  · Is pregnant.  · Has been abusing drugs.  · Has other medical problems, including:  ¨ Infection.  ¨ Heart, lung, or liver disease.  ¨ Seizures.  ¨ Mental health problems.  What are the signs or symptoms?  Symptoms of this condition can be mild to moderate, or they can be severe.  Mild to moderate symptoms may include:  · Fatigue.  · Nightmares.  · Trouble sleeping.  · Depression.  · Anxiety.  · Inability to think clearly.  · Mood swings.  · Irritability.  · Loss of appetite.  · Nausea or vomiting.  · Clammy skin.  · Extreme sweating.  · Rapid heartbeat.  · Shakiness.  · Uncontrollable shaking (tremor).  Severe symptoms may  include:  · Fever.  · Seizures.  · Severe confusion.  · Feeling or seeing things that are not there (hallucinations).  Symptoms usually begin within eight hours after a person stops drinking or drinks less. They can last for weeks.  How is this diagnosed?  Alcohol withdrawal is diagnosed with a medical history and physical exam. Sometimes, urine and blood tests are also done.  How is this treated?  Treatment may involve:  · Monitoring blood pressure, pulse, and breathing.  · Getting fluids through an IV tube.  · Medicine to reduce anxiety.  · Medicine to prevent or control seizures.  · Multivitamins and B vitamins.  · Having a health care provider check on you daily.  If symptoms are moderate to severe or if there is a risk of severe withdrawal, treatment may be done at a hospital or treatment center.  Follow these instructions at home:  · Take medicines and vitamin supplements only as directed by your health care provider.  · Do not drink alcohol.  · Have someone stay with you or be available if you need help.  · Drink enough fluid to keep your urine clear or pale yellow.  · Consider joining a 12-step program or another alcohol support group.  Contact a health care provider if:  · Your symptoms get worse or do not go away.  · You cannot keep food or water in your stomach.  · You are struggling with not drinking alcohol.  · You cannot stop drinking alcohol.  Get help right away if:  · You have an irregular heartbeat.  · You have chest pain.  · You have trouble breathing.  · You have symptoms of severe withdrawal, such as:  ¨ A fever.  ¨ Seizures.  ¨ Severe confusion.  ¨ Hallucinations.  This information is not intended to replace advice given to you by your health care provider. Make sure you discuss any questions you have with your health care provider.  Document Released: 09/27/2006 Document Revised: 04/26/2017 Document Reviewed: 10/06/2015  [employer] Interactive Patient Education © 2017 [employer]

## 2020-01-26 NOTE — THERAPY
"Pt w/ hx of ETOH.  Admitted w/ LE edema and recent weight gain.  She was indep PTA.  Lives w/ her sig. other in a two story house.  She avoids inside stairs.  Today, she is able to move in/out of bed, stand and ambulate w/o AD and w/o loss of balance.  Also able to perform stairs w/o assist.  No acute PT needs.  REcommend oob/amb prn w/ spv and nsg OK.  No acute PT needs.    Physical Therapy Evaluation completed.   Bed Mobility:  Supine to Sit: Supervised  Transfers: Sit to Stand: Supervised  Gait: Level Of Assist: Supervised with No Equipment Needed       Plan of Care: Patient with no further skilled PT needs in the acute care setting at this time  Discharge Recommendations: Equipment: No Equipment Needed. Post-acute therapy   Anticipate that the patient will have no further physical therapy needs after discharge from the hospital.  See \"Rehab Therapy-Acute\" Patient Summary Report for complete documentation.     "

## 2020-01-26 NOTE — CARE PLAN
Problem: Infection  Goal: Will remain free from infection  Outcome: PROGRESSING AS EXPECTED     Problem: Communication  Goal: The ability to communicate needs accurately and effectively will improve  Outcome: PROGRESSING SLOWER THAN EXPECTED     Problem: Venous Thromboembolism (VTW)/Deep Vein Thrombosis (DVT) Prevention:  Goal: Patient will participate in Venous Thrombosis (VTE)/Deep Vein Thrombosis (DVT)Prevention Measures  Outcome: PROGRESSING SLOWER THAN EXPECTED     Problem: Bowel/Gastric:  Goal: Normal bowel function is maintained or improved  Outcome: PROGRESSING SLOWER THAN EXPECTED

## 2020-01-26 NOTE — CARE PLAN
Problem: Communication  Goal: The ability to communicate needs accurately and effectively will improve  Outcome: PROGRESSING AS EXPECTED   Patient requires frequent reminders to call before getting OOB.   Problem: Infection  Goal: Will remain free from infection  Outcome: PROGRESSING AS EXPECTED   Patient shows no signs of infection at this time.  Problem: Safety  Goal: Will remain free from injury  Outcome: PROGRESSING AS EXPECTED   Bed alarm in place, call light within reach. Frequent reminders for patient to call before getting up.

## 2020-01-27 VITALS
WEIGHT: 205.03 LBS | OXYGEN SATURATION: 96 % | RESPIRATION RATE: 18 BRPM | HEIGHT: 66 IN | TEMPERATURE: 98.3 F | SYSTOLIC BLOOD PRESSURE: 128 MMHG | HEART RATE: 94 BPM | BODY MASS INDEX: 32.95 KG/M2 | DIASTOLIC BLOOD PRESSURE: 91 MMHG

## 2020-01-27 PROBLEM — E83.42 HYPOMAGNESEMIA: Status: RESOLVED | Noted: 2020-01-24 | Resolved: 2020-01-27

## 2020-01-27 PROBLEM — E83.39 HYPOPHOSPHATEMIA: Status: RESOLVED | Noted: 2020-01-24 | Resolved: 2020-01-27

## 2020-01-27 PROBLEM — M79.89 LEG SWELLING: Status: RESOLVED | Noted: 2020-01-22 | Resolved: 2020-01-27

## 2020-01-27 PROBLEM — E87.6 HYPOKALEMIA: Status: RESOLVED | Noted: 2019-09-17 | Resolved: 2020-01-27

## 2020-01-27 PROBLEM — F10.939 ALCOHOL WITHDRAWAL (HCC): Status: RESOLVED | Noted: 2020-01-22 | Resolved: 2020-01-27

## 2020-01-27 LAB
ANION GAP SERPL CALC-SCNC: 6 MMOL/L (ref 0–11.9)
BUN SERPL-MCNC: 6 MG/DL (ref 8–22)
CALCIUM SERPL-MCNC: 8.9 MG/DL (ref 8.5–10.5)
CHLORIDE SERPL-SCNC: 107 MMOL/L (ref 96–112)
CO2 SERPL-SCNC: 24 MMOL/L (ref 20–33)
CREAT SERPL-MCNC: 0.55 MG/DL (ref 0.5–1.4)
GLUCOSE SERPL-MCNC: 102 MG/DL (ref 65–99)
MAGNESIUM SERPL-MCNC: 1.7 MG/DL (ref 1.5–2.5)
POTASSIUM SERPL-SCNC: 3.8 MMOL/L (ref 3.6–5.5)
SODIUM SERPL-SCNC: 137 MMOL/L (ref 135–145)

## 2020-01-27 PROCEDURE — 80048 BASIC METABOLIC PNL TOTAL CA: CPT

## 2020-01-27 PROCEDURE — A9270 NON-COVERED ITEM OR SERVICE: HCPCS | Performed by: INTERNAL MEDICINE

## 2020-01-27 PROCEDURE — 700111 HCHG RX REV CODE 636 W/ 250 OVERRIDE (IP): Performed by: INTERNAL MEDICINE

## 2020-01-27 PROCEDURE — 36415 COLL VENOUS BLD VENIPUNCTURE: CPT

## 2020-01-27 PROCEDURE — A9270 NON-COVERED ITEM OR SERVICE: HCPCS | Performed by: HOSPITALIST

## 2020-01-27 PROCEDURE — 700102 HCHG RX REV CODE 250 W/ 637 OVERRIDE(OP): Performed by: HOSPITALIST

## 2020-01-27 PROCEDURE — 99239 HOSP IP/OBS DSCHRG MGMT >30: CPT | Performed by: HOSPITALIST

## 2020-01-27 PROCEDURE — 83735 ASSAY OF MAGNESIUM: CPT

## 2020-01-27 PROCEDURE — 700102 HCHG RX REV CODE 250 W/ 637 OVERRIDE(OP): Performed by: INTERNAL MEDICINE

## 2020-01-27 RX ORDER — POTASSIUM CHLORIDE 20 MEQ/1
20 TABLET, EXTENDED RELEASE ORAL ONCE
Status: COMPLETED | OUTPATIENT
Start: 2020-01-27 | End: 2020-01-27

## 2020-01-27 RX ADMIN — ENOXAPARIN SODIUM 40 MG: 100 INJECTION SUBCUTANEOUS at 04:42

## 2020-01-27 RX ADMIN — MAGNESIUM 64 MG (MAGNESIUM CHLORIDE) TABLET,DELAYED RELEASE 128 MG: at 08:23

## 2020-01-27 RX ADMIN — OMEPRAZOLE 20 MG: 20 CAPSULE, DELAYED RELEASE ORAL at 04:41

## 2020-01-27 RX ADMIN — POTASSIUM CHLORIDE 40 MEQ: 1500 TABLET, EXTENDED RELEASE ORAL at 04:41

## 2020-01-27 RX ADMIN — POTASSIUM CHLORIDE 20 MEQ: 1500 TABLET, EXTENDED RELEASE ORAL at 08:23

## 2020-01-27 RX ADMIN — THERA TABS 1 TABLET: TAB at 04:42

## 2020-01-27 ASSESSMENT — LIFESTYLE VARIABLES
AGITATION: SOMEWHAT MORE THAN NORMAL ACTIVITY
ORIENTATION AND CLOUDING OF SENSORIUM: ORIENTED AND CAN DO SERIAL ADDITIONS
NAUSEA AND VOMITING: NO NAUSEA AND NO VOMITING
AUDITORY DISTURBANCES: NOT PRESENT
TREMOR: TREMOR NOT VISIBLE BUT CAN BE FELT, FINGERTIP TO FINGERTIP
VISUAL DISTURBANCES: NOT PRESENT
TOTAL SCORE: 3
NAUSEA AND VOMITING: NO NAUSEA AND NO VOMITING
ANXIETY: MILDLY ANXIOUS
HEADACHE, FULLNESS IN HEAD: NOT PRESENT
PAROXYSMAL SWEATS: NO SWEAT VISIBLE
TOTAL SCORE: 3
TREMOR: TREMOR NOT VISIBLE BUT CAN BE FELT, FINGERTIP TO FINGERTIP
ORIENTATION AND CLOUDING OF SENSORIUM: ORIENTED AND CAN DO SERIAL ADDITIONS
AUDITORY DISTURBANCES: NOT PRESENT
ANXIETY: MILDLY ANXIOUS
HEADACHE, FULLNESS IN HEAD: NOT PRESENT
VISUAL DISTURBANCES: NOT PRESENT
PAROXYSMAL SWEATS: NO SWEAT VISIBLE
AGITATION: SOMEWHAT MORE THAN NORMAL ACTIVITY

## 2020-01-27 NOTE — PROGRESS NOTES
[facility] Medicine Daily Progress Note    Date of Service  1/26/2020    Chief Complaint  54 y.o. female admitted 1/22/2020 with edema and alcohol withdrawal    Hospital Course          Interval Problem Update    Overall improved  Tolerated diet  Did well with physical therapy however felt dizzy with mobilization after PT evaluation        Consultants/Specialty  None    Code Status  Full code    Disposition  To be determined    Review of Systems  Review of Systems   Constitutional: Negative for chills and fever.   Cardiovascular: Negative for chest pain and palpitations.   Psychiatric/Behavioral: Positive for substance abuse. The patient is nervous/anxious.    All other systems reviewed and are negative.       Physical Exam  Temp:  [36.2 °C (97.1 °F)-36.5 °C (97.7 °F)] 36.3 °C (97.4 °F)  Pulse:  [] 104  Resp:  [16] 16  BP: (109-140)/(68-88) 115/68  SpO2:  [93 %-97 %] 93 %    Physical Exam  Vitals signs and nursing note reviewed.   Constitutional:       General: She is not in acute distress.  HENT:      Head: Normocephalic and atraumatic.      Nose: Nose normal.      Mouth/Throat:      Pharynx: No oropharyngeal exudate or posterior oropharyngeal erythema.   Eyes:      General:         Right eye: No discharge.         Left eye: No discharge.   Neck:      Musculoskeletal: Neck supple.   Cardiovascular:      Rate and Rhythm: Normal rate and regular rhythm.      Heart sounds: No murmur. No friction rub. No gallop.    Pulmonary:      Effort: Pulmonary effort is normal. No respiratory distress.      Breath sounds: No stridor. No rhonchi or rales.   Chest:      Chest wall: No tenderness.   Abdominal:      General: Bowel sounds are normal. There is no distension.      Palpations: Abdomen is soft. There is no mass.      Tenderness: There is no tenderness. There is no guarding.   Musculoskeletal:         General: Swelling present. No tenderness.   Skin:     General: Skin is warm and dry.      Coloration: Skin is not  cyanotic.      Nails: There is no clubbing.     Neurological:      General: No focal deficit present.      Mental Status: She is alert and oriented to person, place, and time.      Cranial Nerves: No cranial nerve deficit.      Motor: No weakness.   Psychiatric:         Mood and Affect: Mood normal.      Comments: Anxious         Fluids    Intake/Output Summary (Last 24 hours) at 1/26/2020 1617  Last data filed at 1/26/2020 0900  Gross per 24 hour   Intake 1120 ml   Output --   Net 1120 ml       Laboratory  Recent Labs     01/24/20  1141 01/25/20  0928 01/26/20  0233   WBC 3.7* 4.3* 4.5*   RBC 3.84* 4.13* 3.86*   HEMOGLOBIN 12.6 13.2 12.5   HEMATOCRIT 36.8* 39.5 37.5   MCV 95.8 95.6 97.2   MCH 32.8 32.0 32.4   MCHC 34.2 33.4* 33.3*   RDW 61.9* 61.5* 61.8*   PLATELETCT 68* 85* 93*   MPV 10.0 10.7 9.7     Recent Labs     01/24/20  0213 01/25/20  0344 01/26/20  0233   SODIUM 136 138 140   POTASSIUM 4.5 3.7 3.1*   CHLORIDE 104 107 105   CO2 21 18* 24   GLUCOSE 107* 86 128*   BUN 7* 7* 6*   CREATININE 0.68 0.59 0.68   CALCIUM 9.2 8.7 9.0                   Imaging  US-EXTREMITY VENOUS LOWER BILAT   Final Result      CT-ABDOMEN-PELVIS WITH   Final Result      1.  Marked hepatic steatosis.   2.  Hepatomegaly.   3.  Status post cholecystectomy.   4.  Postsurgical changes of the distal esophagus.   5.  Small hiatal hernia.   6.  Colonic diverticulosis without evidence of diverticulitis.   7.  Status post hysterectomy.      DX-CHEST-PORTABLE (1 VIEW)   Final Result      No acute cardiopulmonary disease evident.           Assessment/Plan   Alcohol withdrawal (HCC)  With alcohol dependence    Improved  Long discussion about importance of cessation  Lorazepam as needed per CIWA    Elevated liver enzymes  CT with hepatic steatosis    LFTs improved abdominal exam is benign    Hypernatremia  Resolved with hydration    Leg swelling  Dependent edema    Duplex negative for DVT    Thrombocytopenia (HCC)  Likely related to bone marrow  toxicity from alcoholism    Platelet counts 93,000 improved    Hypokalemia  Replete potassium and recheck levels in a.m.      Encourage mobilization if continues to improve likely home in a.m.  Discussed with nursing staff pharmacist    VTE prophylaxis: Lovenox

## 2020-01-27 NOTE — DISCHARGE SUMMARY
Discharge Summary    CHIEF COMPLAINT ON ADMISSION  Chief Complaint   Patient presents with   • Leg Swelling     Pt reports symptoms for about 1 mo. Pt states at [facility] today and sent for further reval. pt states recent Gallbladder surgery in Sept.    • Shortness of Breath   • Facial Swelling   • Fatigue   • Cramping     in abd       Reason for Admission  Sent By MD; Leg Swelling     Admission Date  1/22/2020    CODE STATUS  Prior    HPI & HOSPITAL COURSE  This is a 54 y.o. female here with lower extremity edema and alcohol withdrawal.  She was admitted and started on Librium tapering dose and Lorazepam per [facility] protocol.  She had lower extremity duplex which was negative for DVT and she had a normal BNP.  Her alcohol withdrawal symptoms resolved.  Her electrolytes were repleted.  On exam today she feels back to her baseline.  She is tolerating her diet.  I had a long discussion with her about the importance of abstaining from alcohol and ask our  to provide her with available resources to help her quit.  I strongly encouraged her to follow-up with her PCP for reevaluation and recheck labs      Therefore, she is discharged in good and stable condition to home with close outpatient follow-up.    The patient met 2-midnight criteria for an inpatient stay at the time of discharge.    Discharge Date  1/27/2020    FOLLOW UP ITEMS POST DISCHARGE  Follow-up with PCP with repeat CBC and chemistry panel    DISCHARGE DIAGNOSES  Active Problems:    Elevated liver enzymes POA: Yes    Thrombocytopenia (HCC) POA: Unknown  Resolved Problems:    Alcohol withdrawal (HCC) POA: Yes    Hypernatremia POA: Yes    Leg swelling POA: Yes    Hypomagnesemia POA: Unknown    Hypophosphatemia POA: Unknown    Hypokalemia POA: Yes      FOLLOW UP  Future Appointments   Date Time Provider Department Center   1/29/2020 11:25 AM LORIN Calero [facility]     No follow-up provider specified.    MEDICATIONS ON DISCHARGE      Medication List      CONTINUE taking these medications      Instructions   BIOTIN PO   Take 1 Tab by mouth every day.  Dose:  1 Tab     CALCIUM PO   Take 1 Tab by mouth every day.  Dose:  1 Tab     omeprazole 20 MG delayed-release capsule  Commonly known as:  PRILOSEC   Take 20 mg by mouth 2 Times a Day.  Dose:  20 mg     ONE-A-DAY WOMENS PO   Take 1 Tab by mouth every day.  Dose:  1 Tab        STOP taking these medications    sucralfate 1 GM Tabs  Commonly known as:  CARAFATE            Allergies  No Known Allergies    DIET  No orders of the defined types were placed in this encounter.      ACTIVITY  As tolerated.  Weight bearing as tolerated    CONSULTATIONS  None    PROCEDURES  None    LABORATORY  Lab Results   Component Value Date    SODIUM 137 01/27/2020    POTASSIUM 3.8 01/27/2020    CHLORIDE 107 01/27/2020    CO2 24 01/27/2020    GLUCOSE 102 (H) 01/27/2020    BUN 6 (L) 01/27/2020    CREATININE 0.55 01/27/2020    CREATININE 0.7 01/26/2008        Lab Results   Component Value Date    WBC 4.5 (L) 01/26/2020    HEMOGLOBIN 12.5 01/26/2020    HEMATOCRIT 37.5 01/26/2020    PLATELETCT 93 (L) 01/26/2020        Total time of the discharge process exceeds 35 minutes.

## 2020-01-27 NOTE — PROGRESS NOTES
Assumed care of patient, bedside report received from FLOR Ramos. Providing care with Lorna PHELPS RN. Updated on POC, call light within reach and fall precautions in place. Bed locked and in lowest position. Patient instructed to call for assistance before getting out of bed. All questions answered, no other needs at this time.

## 2020-01-27 NOTE — PROGRESS NOTES
Assumed care of patient, bedside report received from FLOR Ramos. Providing care with Kasandra carlos RN. Updated on POC, call light within reach and fall precautions in place. Bed locked and in lowest position. Patient instructed to call for assistance before getting out of bed. All questions answered, no other needs at this time.

## 2020-02-11 ENCOUNTER — TELEPHONE (OUTPATIENT)
Dept: PHYSICAL MEDICINE AND REHAB | Facility: MEDICAL CENTER | Age: 55
End: 2020-02-11

## 2020-02-11 NOTE — TELEPHONE ENCOUNTER
Angélica said she was denied referral for physiatry. Looks like it was a referral for alcohol withdrawal syndrome. I let her know we don't do that here. I gave her number for referral dept.     She said she is fine and no having problems with withdrawing from alcohol. She said if she has problems, she will go to AA.

## 2020-02-19 ENCOUNTER — OFFICE VISIT (OUTPATIENT)
Dept: MEDICAL GROUP | Facility: MEDICAL CENTER | Age: 55
End: 2020-02-19
Attending: INTERNAL MEDICINE
Payer: MEDICAID

## 2020-02-19 VITALS
HEART RATE: 92 BPM | TEMPERATURE: 97.1 F | DIASTOLIC BLOOD PRESSURE: 82 MMHG | SYSTOLIC BLOOD PRESSURE: 122 MMHG | BODY MASS INDEX: 31.82 KG/M2 | WEIGHT: 198 LBS | HEIGHT: 66 IN | OXYGEN SATURATION: 96 % | RESPIRATION RATE: 16 BRPM

## 2020-02-19 DIAGNOSIS — F10.21 ALCOHOLISM IN REMISSION (HCC): ICD-10-CM

## 2020-02-19 DIAGNOSIS — I83.893 VARICOSE VEINS OF BOTH LEGS WITH EDEMA: ICD-10-CM

## 2020-02-19 DIAGNOSIS — D69.6 THROMBOCYTOPENIA (HCC): ICD-10-CM

## 2020-02-19 DIAGNOSIS — L29.9 ITCHING: ICD-10-CM

## 2020-02-19 DIAGNOSIS — E78.5 HYPERLIPIDEMIA, UNSPECIFIED HYPERLIPIDEMIA TYPE: ICD-10-CM

## 2020-02-19 DIAGNOSIS — K21.00 GASTROESOPHAGEAL REFLUX DISEASE WITH ESOPHAGITIS: ICD-10-CM

## 2020-02-19 DIAGNOSIS — Z13.1 SCREENING FOR DIABETES MELLITUS: ICD-10-CM

## 2020-02-19 DIAGNOSIS — Z12.11 SCREEN FOR COLON CANCER: ICD-10-CM

## 2020-02-19 DIAGNOSIS — R74.8 ELEVATED LIVER ENZYMES: ICD-10-CM

## 2020-02-19 PROBLEM — Z98.84 S/P LAPAROSCOPIC SLEEVE GASTRECTOMY: Status: ACTIVE | Noted: 2020-02-19

## 2020-02-19 PROBLEM — K92.2 UGIB (UPPER GASTROINTESTINAL BLEED): Status: RESOLVED | Noted: 2019-09-17 | Resolved: 2020-02-19

## 2020-02-19 PROBLEM — K70.10 ALCOHOLIC HEPATITIS WITHOUT ASCITES: Status: RESOLVED | Noted: 2019-09-17 | Resolved: 2020-02-19

## 2020-02-19 PROBLEM — R60.0 BILATERAL LOWER EXTREMITY EDEMA: Status: RESOLVED | Noted: 2020-02-19 | Resolved: 2020-02-19

## 2020-02-19 PROBLEM — R60.0 BILATERAL LOWER EXTREMITY EDEMA: Status: ACTIVE | Noted: 2020-02-19

## 2020-02-19 PROCEDURE — 99204 OFFICE O/P NEW MOD 45 MIN: CPT | Performed by: INTERNAL MEDICINE

## 2020-02-19 PROCEDURE — 99212 OFFICE O/P EST SF 10 MIN: CPT | Performed by: INTERNAL MEDICINE

## 2020-02-19 SDOH — HEALTH STABILITY: MENTAL HEALTH: HOW MANY STANDARD DRINKS CONTAINING ALCOHOL DO YOU HAVE ON A TYPICAL DAY?: 3 OR 4

## 2020-02-19 SDOH — HEALTH STABILITY: MENTAL HEALTH
STRESS IS WHEN SOMEONE FEELS TENSE, NERVOUS, ANXIOUS, OR CAN'T SLEEP AT NIGHT BECAUSE THEIR MIND IS TROUBLED. HOW STRESSED ARE YOU?: VERY MUCH

## 2020-02-19 SDOH — HEALTH STABILITY: MENTAL HEALTH: HOW OFTEN DO YOU HAVE 6 OR MORE DRINKS ON ONE OCCASION?: WEEKLY

## 2020-02-19 SDOH — HEALTH STABILITY: PHYSICAL HEALTH: ON AVERAGE, HOW MANY DAYS PER WEEK DO YOU ENGAGE IN MODERATE TO STRENUOUS EXERCISE (LIKE A BRISK WALK)?: 3 DAYS

## 2020-02-19 ASSESSMENT — PAIN SCALES - GENERAL: PAINLEVEL: NO PAIN

## 2020-02-19 ASSESSMENT — PATIENT HEALTH QUESTIONNAIRE - PHQ9: CLINICAL INTERPRETATION OF PHQ2 SCORE: 0

## 2020-02-19 NOTE — ASSESSMENT & PLAN NOTE
She has had persistent thrombocytopenia dating back to September 2019.  Prior to that had normal platelet count.  She does have a history of alcohol abuse for the past year and a half but has been sober for about a month and a half.  She denies easy bleeding or bruising.

## 2020-02-19 NOTE — ASSESSMENT & PLAN NOTE
She has a long history of significant bilateral varicose veins, right worse than left.  She has worked as a  for most of her career.  She reports the swelling in her legs is worse at the end of the day and gets better when her legs are elevated.  When she does a lot of walking or standing she sees a prominent bump on the medial side of her right calf develop that is nontender.  States in the past she has seen Dr. Mckinnon of vascular surgery and they were planning to intervene on the varicose veins however [place] before full work-up could be complete.  Last visit at their office she believes was in July of last year.  She is interested in reestablishing care with Dr. Mckinnon for further follow-up.  She denies chest pain, shortness of breath, significant weight fluctuation.

## 2020-02-19 NOTE — ASSESSMENT & PLAN NOTE
She has a history of heavy alcohol use for the past year and a half.  She was consuming at least 3 alcoholic beverages daily but states she has been sober for the past month and a half.  She was hospitalized in January for alcohol withdrawal and detox.

## 2020-02-19 NOTE — ASSESSMENT & PLAN NOTE
R>L, more on feet sees bump on medial right calf.  Did prelim work up on varicose veins (US through [facility])

## 2020-02-19 NOTE — ASSESSMENT & PLAN NOTE
She reports some itching at night.  She is needing to take Benadryl and Tylenol PM in order to sleep.  When she was last hospitalized her bilirubin was mildly elevated at 1.8.  She denies any skin rashes or lesions.

## 2020-02-19 NOTE — PROGRESS NOTES
Angélica Beck is a 54 y.o. female here for hospital discharge follow-up, leg swelling, chronic medical problems below, establish care  HPI: Previous PCP was Dr. Frazier  Alcoholism in remission ([facility])  She has a history of heavy alcohol use for the past year and a half.  She was consuming at least 3 alcoholic beverages daily but states she has been sober for the past month and a half.  She was hospitalized in January for alcohol withdrawal and detox.      Itching  She reports some itching at night.  She is needing to take Benadryl and Tylenol PM in order to sleep.  When she was last hospitalized her bilirubin was mildly elevated at 1.8.  She denies any skin rashes or lesions.      Varicose veins of both legs with edema  She has a long history of significant bilateral varicose veins, right worse than left.  She has worked as a  for most of her career.  She reports the swelling in her legs is worse at the end of the day and gets better when her legs are elevated.  When she does a lot of walking or standing she sees a prominent bump on the medial side of her right calf develop that is nontender.  States in the past she has seen Dr. Mckinnon of vascular surgery and they were planning to intervene on the varicose veins however she moved out of state before full work-up could be complete.  Last visit at their office she believes was in July of last year.  She is interested in reestablishing care with Dr. Mckinnon for further follow-up.  She denies chest pain, shortness of breath, significant weight fluctuation.    Gastroesophageal reflux disease with esophagitis  She has a history of significant GERD as well as gastric ulcer.  During most recent hospital admission, she was started on omeprazole twice daily and her Carafate was discontinued.  She states on this regimen she has been feeling good and has not had any stomach pain with eating.  She denies nausea, vomiting, melena, hematochezia.  She does follow with GI  but believes she needs a new referral as she was only seen in the hospital.     Thrombocytopenia (HCC)  She has had persistent thrombocytopenia dating back to September 2019.  Prior to that had normal platelet count.  She does have a history of alcohol abuse for the past year and a half but has been sober for about a month and a half.  She denies easy bleeding or bruising.    Elevated liver enzymes  She did have mildly elevated liver enzymes while hospitalized last, thought secondary to alcoholic hepatitis.  She has stopped drinking subsequently.  She has never been tested for hepatitis however she denies any history of IV drug use, risky sexual behavior, unprofessional tattoos, or blood transfusions.    Current medicines (including changes today)  Current Outpatient Medications   Medication Sig Dispense Refill   • omeprazole (PRILOSEC) 20 MG delayed-release capsule Take 20 mg by mouth 2 Times a Day.     • CALCIUM PO Take 1 Tab by mouth every day.     • BIOTIN PO Take 1 Tab by mouth every day.     • Multiple Vitamins-Calcium (ONE-A-DAY WOMENS PO) Take 1 Tab by mouth every day.       No current facility-administered medications for this visit.      She  has a past medical history of Cancer (HCC), GERD (gastroesophageal reflux disease), Substance abuse (HCC), and Unspecified urinary incontinence.  She  has a past surgical history that includes other orthopedic surgery; lumpectomy; other; bladder sling female (12/3/2014); other abdominal surgery (02/2018); tubal coagulation laparoscopic bilateral; hysterectomy laparoscopy (2000); yvonne by laparoscopy (8/20/2019); and gastroscopy-endo (N/A, 9/17/2019).  Social History     Tobacco Use   • Smoking status: Never Smoker   • Smokeless tobacco: Never Used   Substance Use Topics   • Alcohol use: Not Currently     Alcohol/week: 1.8 oz     Types: 3 Shots of liquor per week     Frequency: 2-3 times a week     Drinks per session: 3 or 4     Binge frequency: Weekly     Comment: few  times a week   • Drug use: No     Social History     Social History Narrative   • Not on file     Family History   Problem Relation Age of Onset   • Diabetes Father    • Other Father         parkinsons   • Cancer Father         renal    • Cancer Other         ovarian   • Cancer Maternal Grandmother         ovarian   • Hypertension Mother          ROS  As above in HPI  All other systems reviewed and are negative     Objective:     Vitals:    02/19/20 0819   BP: 122/82   Pulse: 92   Resp: 16   Temp: 36.2 °C (97.1 °F)   SpO2: 96%     Body mass index is 31.97 kg/m².  Physical Exam:    Constitutional: Alert, no distress.  Skin: Warm, dry, good turgor, no rashes in visible areas.  Eye: Equal, round and reactive, conjunctiva clear, lids normal.  ENMT: Lips without lesions, good dentition, oropharynx clear, TM's clear bilaterally.  Neck: Trachea midline, no masses, no thyromegaly. No cervical or supraclavicular lymphadenopathy.  Respiratory: Unlabored respiratory effort, lungs clear to auscultation, no wheezes, no ronchi.  Cardiovascular: Regular rate and rhythm, no murmurs appreciated  Abdomen: Soft, obese, non-tender, no masses, no hepatosplenomegaly.  Psych: Alert and oriented x3, normal affect and mood.  Extrem: No pitting edema however significant varicose veins over bilateral calves, right worse than left, no tenderness to palpation      Assessment and Plan:   The following treatment plan was discussed    1. Alcoholism in remission (HCC)  We discussed in detail today that she should refrain from drinking in the future.  She is very committed to this.  We will continue to monitor    2. Screen for colon cancer  Has never had a colonoscopy  - REFERRAL TO GASTROENTEROLOGY    3. Itching  Could be related to hyperbilirubinemia.  We will obtain an updated CMP.  For now she can continue with the oral Benadryl which has been helpful.  - Comp Metabolic Panel; Future  -Continue Benadryl as needed    4. Screening for diabetes  mellitus  - HEMOGLOBIN A1C; Future    5. Hyperlipidemia, unspecified hyperlipidemia type  - Lipid Profile; Future    6. Elevated liver enzymes  Most likely was secondary to alcoholic hepatitis and suspect resolution.  We will obtain updated labs.  - Comp Metabolic Panel; Future    7. Varicose veins of both legs with edema  Her lower extremity edema appears mostly related to venous insufficiency with severe varicose veins.  I have referred her back to Dr. Mckinnon to discuss treatment options.  - REFERRAL TO VASCULAR SURGERY    8. Gastroesophageal reflux disease with esophagitis  Stable, controlled with current meds.  We will have her see GI for surveillance EGD when appropriate  -Continue omeprazole 20 mg twice daily  - REFERRAL TO GASTROENTEROLOGY    9. Thrombocytopenia (HCC)  Suspect related to potential early cirrhosis from alcohol use however onset was fairly sudden and her alcohol history is not extreme.  We will obtain updated labs to see if thrombocytopenia persists.  Spleen was not visualized on abdominal ultrasound from 6/13/19 but could consider abdominal ultrasound to evaluate for splenomegaly if persistent  - CBC WITH DIFFERENTIAL; Future            Followup: Return in about 1 year (around 2/19/2021), or if symptoms worsen or fail to improve, for annual.

## 2020-02-19 NOTE — ASSESSMENT & PLAN NOTE
She did have mildly elevated liver enzymes while hospitalized last, thought secondary to alcoholic hepatitis.  She has stopped drinking subsequently.  She has never been tested for hepatitis however she denies any history of IV drug use, risky sexual behavior, unprofessional tattoos, or blood transfusions.

## 2020-02-19 NOTE — ASSESSMENT & PLAN NOTE
She has a history of significant GERD as well as gastric ulcer.  During most recent hospital admission, she was started on omeprazole twice daily and her Carafate was discontinued.  She states on this regimen she has been feeling good and has not had any stomach pain with eating.  She denies nausea, vomiting, melena, hematochezia.  She does follow with GI but believes she needs a new referral as she was only seen in the hospital.

## 2020-02-20 ENCOUNTER — TELEPHONE (OUTPATIENT)
Dept: MEDICAL GROUP | Facility: MEDICAL CENTER | Age: 55
End: 2020-02-20

## 2020-02-21 ENCOUNTER — HOSPITAL ENCOUNTER (OUTPATIENT)
Dept: LAB | Facility: MEDICAL CENTER | Age: 55
End: 2020-02-21
Attending: INTERNAL MEDICINE
Payer: MEDICAID

## 2020-02-21 DIAGNOSIS — R74.8 ELEVATED LIVER ENZYMES: ICD-10-CM

## 2020-02-21 DIAGNOSIS — E78.5 HYPERLIPIDEMIA, UNSPECIFIED HYPERLIPIDEMIA TYPE: ICD-10-CM

## 2020-02-21 DIAGNOSIS — Z13.1 SCREENING FOR DIABETES MELLITUS: ICD-10-CM

## 2020-02-21 DIAGNOSIS — D69.6 THROMBOCYTOPENIA (HCC): ICD-10-CM

## 2020-02-21 LAB
ALBUMIN SERPL BCP-MCNC: 3.9 G/DL (ref 3.2–4.9)
ALBUMIN/GLOB SERPL: 1.3 G/DL
ALP SERPL-CCNC: 63 U/L (ref 30–99)
ALT SERPL-CCNC: 34 U/L (ref 2–50)
ANION GAP SERPL CALC-SCNC: 6 MMOL/L (ref 0–11.9)
AST SERPL-CCNC: 49 U/L (ref 12–45)
BASOPHILS # BLD AUTO: 0.6 % (ref 0–1.8)
BASOPHILS # BLD: 0.04 K/UL (ref 0–0.12)
BILIRUB SERPL-MCNC: 0.8 MG/DL (ref 0.1–1.5)
BUN SERPL-MCNC: 11 MG/DL (ref 8–22)
CALCIUM SERPL-MCNC: 9.7 MG/DL (ref 8.5–10.5)
CHLORIDE SERPL-SCNC: 104 MMOL/L (ref 96–112)
CHOLEST SERPL-MCNC: 254 MG/DL (ref 100–199)
CO2 SERPL-SCNC: 27 MMOL/L (ref 20–33)
CREAT SERPL-MCNC: 0.87 MG/DL (ref 0.5–1.4)
EOSINOPHIL # BLD AUTO: 0.25 K/UL (ref 0–0.51)
EOSINOPHIL NFR BLD: 3.9 % (ref 0–6.9)
ERYTHROCYTE [DISTWIDTH] IN BLOOD BY AUTOMATED COUNT: 52.2 FL (ref 35.9–50)
EST. AVERAGE GLUCOSE BLD GHB EST-MCNC: 108 MG/DL
FASTING STATUS PATIENT QL REPORTED: NORMAL
GLOBULIN SER CALC-MCNC: 3.1 G/DL (ref 1.9–3.5)
GLUCOSE SERPL-MCNC: 98 MG/DL (ref 65–99)
HBA1C MFR BLD: 5.4 % (ref 0–5.6)
HCT VFR BLD AUTO: 41.7 % (ref 37–47)
HDLC SERPL-MCNC: 53 MG/DL
HGB BLD-MCNC: 14.2 G/DL (ref 12–16)
IMM GRANULOCYTES # BLD AUTO: 0.01 K/UL (ref 0–0.11)
IMM GRANULOCYTES NFR BLD AUTO: 0.2 % (ref 0–0.9)
LDLC SERPL CALC-MCNC: 183 MG/DL
LYMPHOCYTES # BLD AUTO: 1.53 K/UL (ref 1–4.8)
LYMPHOCYTES NFR BLD: 23.8 % (ref 22–41)
MCH RBC QN AUTO: 34.5 PG (ref 27–33)
MCHC RBC AUTO-ENTMCNC: 34.1 G/DL (ref 33.6–35)
MCV RBC AUTO: 101.2 FL (ref 81.4–97.8)
MONOCYTES # BLD AUTO: 0.59 K/UL (ref 0–0.85)
MONOCYTES NFR BLD AUTO: 9.2 % (ref 0–13.4)
NEUTROPHILS # BLD AUTO: 4.02 K/UL (ref 2–7.15)
NEUTROPHILS NFR BLD: 62.3 % (ref 44–72)
NRBC # BLD AUTO: 0 K/UL
NRBC BLD-RTO: 0 /100 WBC
PLATELET # BLD AUTO: 262 K/UL (ref 164–446)
PMV BLD AUTO: 11.1 FL (ref 9–12.9)
POTASSIUM SERPL-SCNC: 4.1 MMOL/L (ref 3.6–5.5)
PROT SERPL-MCNC: 7 G/DL (ref 6–8.2)
RBC # BLD AUTO: 4.12 M/UL (ref 4.2–5.4)
SODIUM SERPL-SCNC: 137 MMOL/L (ref 135–145)
TRIGL SERPL-MCNC: 89 MG/DL (ref 0–149)
WBC # BLD AUTO: 6.4 K/UL (ref 4.8–10.8)

## 2020-02-21 PROCEDURE — 85025 COMPLETE CBC W/AUTO DIFF WBC: CPT

## 2020-02-21 PROCEDURE — 80061 LIPID PANEL: CPT

## 2020-02-21 PROCEDURE — 83036 HEMOGLOBIN GLYCOSYLATED A1C: CPT

## 2020-02-21 PROCEDURE — 36415 COLL VENOUS BLD VENIPUNCTURE: CPT

## 2020-02-21 PROCEDURE — 80053 COMPREHEN METABOLIC PANEL: CPT

## 2020-02-21 NOTE — TELEPHONE ENCOUNTER
1. Caller Name: ever                         Call Back Number: [phone] (home)       How would the patient prefer to be contacted with a response: Phone call OK to leave a detailed message    Pt is asking if it is ok to take tylenol or advil with her current liver isue as well as her gastric sleeve.

## 2020-02-21 NOTE — TELEPHONE ENCOUNTER
Okay to take Tylenol up to 2000 mg daily.  She should avoid NSAIDs after her gastric bypass.    Dinora Barrera M.D.

## 2020-02-25 DIAGNOSIS — Z98.84 S/P LAPAROSCOPIC SLEEVE GASTRECTOMY: ICD-10-CM

## 2020-02-25 DIAGNOSIS — D75.89 MACROCYTOSIS WITHOUT ANEMIA: ICD-10-CM

## 2020-02-25 PROBLEM — R74.8 ELEVATED LIVER ENZYMES: Status: RESOLVED | Noted: 2020-01-22 | Resolved: 2020-02-25

## 2020-02-25 PROBLEM — D69.6 THROMBOCYTOPENIA (HCC): Status: RESOLVED | Noted: 2020-01-24 | Resolved: 2020-02-25

## 2020-02-25 PROBLEM — E78.5 HYPERLIPIDEMIA: Status: ACTIVE | Noted: 2020-02-25

## 2020-03-10 ENCOUNTER — PATIENT MESSAGE (OUTPATIENT)
Dept: MEDICAL GROUP | Facility: MEDICAL CENTER | Age: 55
End: 2020-03-10

## 2020-03-13 RX ORDER — HYDROXYZINE HYDROCHLORIDE 25 MG/1
25 TABLET, FILM COATED ORAL NIGHTLY PRN
Qty: 30 TAB | Refills: 0 | Status: SHIPPED | OUTPATIENT
Start: 2020-03-13 | End: 2020-05-19 | Stop reason: SDUPTHER

## 2020-05-04 ENCOUNTER — HOSPITAL ENCOUNTER (OUTPATIENT)
Dept: RADIOLOGY | Facility: MEDICAL CENTER | Age: 55
End: 2020-05-04
Attending: SURGERY
Payer: MEDICAID

## 2020-05-04 DIAGNOSIS — I83.813 VARICOSE VEINS OF BOTH LOWER EXTREMITIES WITH PAIN: ICD-10-CM

## 2020-05-04 PROCEDURE — 93970 EXTREMITY STUDY: CPT

## 2020-05-05 PROCEDURE — 93970 EXTREMITY STUDY: CPT | Mod: 26 | Performed by: INTERNAL MEDICINE

## 2020-05-07 ENCOUNTER — HOSPITAL ENCOUNTER (OUTPATIENT)
Dept: RADIOLOGY | Facility: MEDICAL CENTER | Age: 55
End: 2020-05-07
Payer: MEDICAID

## 2020-05-18 DIAGNOSIS — L29.9 ITCHING: ICD-10-CM

## 2020-05-19 RX ORDER — HYDROXYZINE HYDROCHLORIDE 25 MG/1
25 TABLET, FILM COATED ORAL NIGHTLY PRN
Qty: 30 TAB | Refills: 0 | Status: SHIPPED | OUTPATIENT
Start: 2020-05-19 | End: 2021-06-18 | Stop reason: SDUPTHER

## 2020-06-10 ENCOUNTER — APPOINTMENT (OUTPATIENT)
Dept: RADIOLOGY | Facility: MEDICAL CENTER | Age: 55
End: 2020-06-10
Attending: FAMILY MEDICINE
Payer: MEDICAID

## 2020-07-09 ENCOUNTER — APPOINTMENT (OUTPATIENT)
Dept: RADIOLOGY | Facility: MEDICAL CENTER | Age: 55
End: 2020-07-09
Attending: FAMILY MEDICINE
Payer: MEDICAID

## 2020-09-14 ENCOUNTER — PATIENT MESSAGE (OUTPATIENT)
Dept: MEDICAL GROUP | Facility: MEDICAL CENTER | Age: 55
End: 2020-09-14

## 2020-09-14 DIAGNOSIS — F10.21 ALCOHOLISM IN REMISSION (HCC): ICD-10-CM

## 2020-09-15 RX ORDER — NALTREXONE HYDROCHLORIDE 50 MG/1
50 TABLET, FILM COATED ORAL DAILY
Qty: 30 TAB | Refills: 5 | Status: SHIPPED | OUTPATIENT
Start: 2020-09-15 | End: 2021-06-18 | Stop reason: SDUPTHER

## 2020-11-07 ENCOUNTER — HOSPITAL ENCOUNTER (EMERGENCY)
Facility: MEDICAL CENTER | Age: 55
End: 2020-11-07
Payer: MEDICAID

## 2020-11-07 VITALS
HEIGHT: 66 IN | OXYGEN SATURATION: 92 % | DIASTOLIC BLOOD PRESSURE: 96 MMHG | RESPIRATION RATE: 16 BRPM | WEIGHT: 188.49 LBS | TEMPERATURE: 97.6 F | HEART RATE: 110 BPM | SYSTOLIC BLOOD PRESSURE: 150 MMHG | BODY MASS INDEX: 30.29 KG/M2

## 2020-11-07 PROCEDURE — 302449 STATCHG TRIAGE ONLY (STATISTIC)

## 2020-11-07 SDOH — HEALTH STABILITY: MENTAL HEALTH: HOW OFTEN DO YOU HAVE 6 OR MORE DRINKS ON ONE OCCASION?: DAILY OR ALMOST DAILY

## 2020-11-07 ASSESSMENT — FIBROSIS 4 INDEX: FIB4 SCORE: 1.83

## 2020-11-08 NOTE — ED TRIAGE NOTES
54 y/o female ambulatory to triage with c/o abd pain increasing in severity over the past several days. Pt states she drinks daily and increases her alcohol when the pain increases. Pt has hx of gastric ulcers as well as a gastric sleeve. Pt is anxious, coached on slowing her breathing.

## 2021-01-19 PROBLEM — K57.30 DIVERTICULOSIS OF COLON: Status: ACTIVE | Noted: 2021-01-19

## 2021-01-20 DIAGNOSIS — Z12.31 BREAST CANCER SCREENING BY MAMMOGRAM: ICD-10-CM

## 2021-03-09 ENCOUNTER — APPOINTMENT (OUTPATIENT)
Dept: RADIOLOGY | Facility: MEDICAL CENTER | Age: 56
End: 2021-03-09
Attending: ADVANCED PRACTICE MIDWIFE
Payer: MEDICAID

## 2021-03-15 DIAGNOSIS — Z23 NEED FOR VACCINATION: ICD-10-CM

## 2021-03-29 ENCOUNTER — HOSPITAL ENCOUNTER (EMERGENCY)
Facility: MEDICAL CENTER | Age: 56
End: 2021-03-29
Attending: EMERGENCY MEDICINE
Payer: MEDICAID

## 2021-03-29 VITALS
RESPIRATION RATE: 19 BRPM | HEIGHT: 66 IN | OXYGEN SATURATION: 90 % | WEIGHT: 165 LBS | SYSTOLIC BLOOD PRESSURE: 137 MMHG | DIASTOLIC BLOOD PRESSURE: 66 MMHG | BODY MASS INDEX: 26.52 KG/M2 | TEMPERATURE: 97.3 F | HEART RATE: 98 BPM

## 2021-03-29 DIAGNOSIS — K29.21 ACUTE ALCOHOLIC GASTRITIS WITH HEMORRHAGE: ICD-10-CM

## 2021-03-29 DIAGNOSIS — F10.920 ALCOHOLIC INTOXICATION WITHOUT COMPLICATION (HCC): ICD-10-CM

## 2021-03-29 LAB
ABO GROUP BLD: ABNORMAL
ALBUMIN SERPL BCP-MCNC: 3.7 G/DL (ref 3.2–4.9)
ALBUMIN/GLOB SERPL: 1.2 G/DL
ALP SERPL-CCNC: 84 U/L (ref 30–99)
ALT SERPL-CCNC: 37 U/L (ref 2–50)
ANION GAP SERPL CALC-SCNC: 17 MMOL/L (ref 7–16)
APTT PPP: 25.8 SEC (ref 24.7–36)
AST SERPL-CCNC: 64 U/L (ref 12–45)
BASOPHILS # BLD AUTO: 0.6 % (ref 0–1.8)
BASOPHILS # BLD: 0.03 K/UL (ref 0–0.12)
BILIRUB SERPL-MCNC: 0.5 MG/DL (ref 0.1–1.5)
BLD GP AB INVEST PLASRBC-IMP: ABNORMAL
BLD GP AB SCN SERPL QL: ABNORMAL
BUN SERPL-MCNC: 6 MG/DL (ref 8–22)
CALCIUM SERPL-MCNC: 8.7 MG/DL (ref 8.5–10.5)
CHLORIDE SERPL-SCNC: 91 MMOL/L (ref 96–112)
CO2 SERPL-SCNC: 25 MMOL/L (ref 20–33)
CREAT SERPL-MCNC: 0.62 MG/DL (ref 0.5–1.4)
EOSINOPHIL # BLD AUTO: 0.05 K/UL (ref 0–0.51)
EOSINOPHIL NFR BLD: 1 % (ref 0–6.9)
ERYTHROCYTE [DISTWIDTH] IN BLOOD BY AUTOMATED COUNT: 52.5 FL (ref 35.9–50)
ETHANOL BLD-MCNC: 284 MG/DL (ref 0–10)
GLOBULIN SER CALC-MCNC: 3.1 G/DL (ref 1.9–3.5)
GLUCOSE SERPL-MCNC: 102 MG/DL (ref 65–99)
HCT VFR BLD AUTO: 39.7 % (ref 37–47)
HGB BLD-MCNC: 13.6 G/DL (ref 12–16)
IMM GRANULOCYTES # BLD AUTO: 0.01 K/UL (ref 0–0.11)
IMM GRANULOCYTES NFR BLD AUTO: 0.2 % (ref 0–0.9)
INR PPP: 1.05 (ref 0.87–1.13)
LIPASE SERPL-CCNC: 84 U/L (ref 11–82)
LYMPHOCYTES # BLD AUTO: 1.7 K/UL (ref 1–4.8)
LYMPHOCYTES NFR BLD: 33.7 % (ref 22–41)
MCH RBC QN AUTO: 32.2 PG (ref 27–33)
MCHC RBC AUTO-ENTMCNC: 34.3 G/DL (ref 33.6–35)
MCV RBC AUTO: 94.1 FL (ref 81.4–97.8)
MONOCYTES # BLD AUTO: 0.61 K/UL (ref 0–0.85)
MONOCYTES NFR BLD AUTO: 12.1 % (ref 0–13.4)
NEUTROPHILS # BLD AUTO: 2.64 K/UL (ref 2–7.15)
NEUTROPHILS NFR BLD: 52.4 % (ref 44–72)
NRBC # BLD AUTO: 0 K/UL
NRBC BLD-RTO: 0 /100 WBC
PLATELET # BLD AUTO: 164 K/UL (ref 164–446)
PMV BLD AUTO: 9.4 FL (ref 9–12.9)
POTASSIUM SERPL-SCNC: 2.9 MMOL/L (ref 3.6–5.5)
PROT SERPL-MCNC: 6.8 G/DL (ref 6–8.2)
PROTHROMBIN TIME: 14 SEC (ref 12–14.6)
RBC # BLD AUTO: 4.22 M/UL (ref 4.2–5.4)
RH BLD: ABNORMAL
SODIUM SERPL-SCNC: 133 MMOL/L (ref 135–145)
WBC # BLD AUTO: 5 K/UL (ref 4.8–10.8)

## 2021-03-29 PROCEDURE — 86870 RBC ANTIBODY IDENTIFICATION: CPT

## 2021-03-29 PROCEDURE — 700111 HCHG RX REV CODE 636 W/ 250 OVERRIDE (IP): Performed by: EMERGENCY MEDICINE

## 2021-03-29 PROCEDURE — A9270 NON-COVERED ITEM OR SERVICE: HCPCS | Performed by: EMERGENCY MEDICINE

## 2021-03-29 PROCEDURE — 86901 BLOOD TYPING SEROLOGIC RH(D): CPT

## 2021-03-29 PROCEDURE — 700105 HCHG RX REV CODE 258: Performed by: EMERGENCY MEDICINE

## 2021-03-29 PROCEDURE — 83690 ASSAY OF LIPASE: CPT

## 2021-03-29 PROCEDURE — 85730 THROMBOPLASTIN TIME PARTIAL: CPT

## 2021-03-29 PROCEDURE — 99285 EMERGENCY DEPT VISIT HI MDM: CPT

## 2021-03-29 PROCEDURE — 86900 BLOOD TYPING SEROLOGIC ABO: CPT

## 2021-03-29 PROCEDURE — C9113 INJ PANTOPRAZOLE SODIUM, VIA: HCPCS | Performed by: EMERGENCY MEDICINE

## 2021-03-29 PROCEDURE — 86850 RBC ANTIBODY SCREEN: CPT

## 2021-03-29 PROCEDURE — 700102 HCHG RX REV CODE 250 W/ 637 OVERRIDE(OP): Performed by: EMERGENCY MEDICINE

## 2021-03-29 PROCEDURE — 82077 ASSAY SPEC XCP UR&BREATH IA: CPT

## 2021-03-29 PROCEDURE — 85610 PROTHROMBIN TIME: CPT

## 2021-03-29 PROCEDURE — 96375 TX/PRO/DX INJ NEW DRUG ADDON: CPT

## 2021-03-29 PROCEDURE — 85025 COMPLETE CBC W/AUTO DIFF WBC: CPT

## 2021-03-29 PROCEDURE — 80053 COMPREHEN METABOLIC PANEL: CPT

## 2021-03-29 PROCEDURE — 96374 THER/PROPH/DIAG INJ IV PUSH: CPT

## 2021-03-29 RX ORDER — CHLORDIAZEPOXIDE HYDROCHLORIDE 25 MG/1
25 CAPSULE, GELATIN COATED ORAL ONCE
Status: COMPLETED | OUTPATIENT
Start: 2021-03-29 | End: 2021-03-29

## 2021-03-29 RX ORDER — LORAZEPAM 2 MG/ML
0.5 INJECTION INTRAMUSCULAR ONCE
Status: COMPLETED | OUTPATIENT
Start: 2021-03-29 | End: 2021-03-29

## 2021-03-29 RX ORDER — POTASSIUM CHLORIDE 20 MEQ/1
60 TABLET, EXTENDED RELEASE ORAL ONCE
Status: COMPLETED | OUTPATIENT
Start: 2021-03-29 | End: 2021-03-29

## 2021-03-29 RX ORDER — OMEPRAZOLE 20 MG/1
20 CAPSULE, DELAYED RELEASE ORAL 2 TIMES DAILY
Qty: 60 CAPSULE | Refills: 0 | Status: SHIPPED | OUTPATIENT
Start: 2021-03-29 | End: 2021-04-28

## 2021-03-29 RX ORDER — SODIUM CHLORIDE 9 MG/ML
1000 INJECTION, SOLUTION INTRAVENOUS ONCE
Status: COMPLETED | OUTPATIENT
Start: 2021-03-29 | End: 2021-03-29

## 2021-03-29 RX ORDER — ONDANSETRON 2 MG/ML
4 INJECTION INTRAMUSCULAR; INTRAVENOUS ONCE
Status: COMPLETED | OUTPATIENT
Start: 2021-03-29 | End: 2021-03-29

## 2021-03-29 RX ORDER — ONDANSETRON 4 MG/1
4 TABLET, ORALLY DISINTEGRATING ORAL EVERY 8 HOURS PRN
Qty: 10 TABLET | Refills: 0 | Status: SHIPPED | OUTPATIENT
Start: 2021-03-29 | End: 2021-06-18

## 2021-03-29 RX ADMIN — ONDANSETRON 4 MG: 2 INJECTION INTRAMUSCULAR; INTRAVENOUS at 22:02

## 2021-03-29 RX ADMIN — SODIUM CHLORIDE 1000 ML: 9 INJECTION, SOLUTION INTRAVENOUS at 20:01

## 2021-03-29 RX ADMIN — CHLORDIAZEPOXIDE HYDROCHLORIDE 25 MG: 25 CAPSULE ORAL at 23:34

## 2021-03-29 RX ADMIN — POTASSIUM CHLORIDE 60 MEQ: 1500 TABLET, EXTENDED RELEASE ORAL at 21:36

## 2021-03-29 RX ADMIN — LORAZEPAM 0.5 MG: 2 INJECTION INTRAMUSCULAR; INTRAVENOUS at 22:39

## 2021-03-29 RX ADMIN — SODIUM CHLORIDE 80 MG: 9 INJECTION, SOLUTION INTRAVENOUS at 20:01

## 2021-03-29 ASSESSMENT — FIBROSIS 4 INDEX: FIB4 SCORE: 1.83

## 2021-03-30 NOTE — ED TRIAGE NOTES
BIB [facility] to rm 39, pt went to [facility] to detox from alcohol, pt said she's been vomiting blood and having black stools for the last 4 days, was seen at [facility] for this 2 days ago and discharged. Immediately started drinking again.   Given IVF and zofran PTA.  Chart up for ERP.

## 2021-03-30 NOTE — DISCHARGE PLANNING
Alert Team:    Provided taxi voucher (#[id number]) upon discharge to return to [facility] to continue alcohol detox.

## 2021-03-30 NOTE — DISCHARGE INSTRUCTIONS
Please stop drinking alcohol.  Please get help for your alcoholism.  Avoid taking ibuprofen, naproxen, and aspirin.    Medically cleared for alcohol detox

## 2021-03-30 NOTE — ED NOTES
Patient medicated for nausea, no vomiting noted. States that she is starting seeing things, fine tremors noted.

## 2021-03-30 NOTE — ED PROVIDER NOTES
ED Provider Note    Scribed for Abhijeet Pappas M.D. by Sukumar Negron. 3/29/2021, 6:49 PM.    Primary care provider: Dinora Barrera M.D.  Means of arrival: EMS  History obtained from: Patient  History limited by: None    CHIEF COMPLAINT  Chief Complaint   Patient presents with    Detox     ETOH detox       HPI  Angélica Beck is a 55 y.o. female who presents to the Emergency Department via EMS for evaluation of blood in vomit onset three days prior to arrival. She reports that she has been having both bright red blood and coffee ground emesis. Two days ago she was seen at [facility][1] and had a scope performed by GI consultants, and was diagnosed with a GI bleed and gastritis. She takes Omeprazole twice daily, but has not filled her prescriptions for [facility][1]. She also notes that she has been having blood in her stools, both melena and bright red blood, in addition to abdominal pain and lightheadedness. Tonight she went to [facility] and they sent her here due to her symptoms. Additionally she states that she has been drinking too much, and that is causing her symptoms. She has been drinking half a bottle of vodka daily for about six weeks. No vaginal bleeding or chest pain. No use of antiinflammatories recently. She has a history of cholecystectomy and gastric sleeve. She has a history of tremors due to withdrawal and questionable history of seizures.      REVIEW OF SYSTEMS  Pertinent positives include blood in vomit, blood in stool, abdominal pain, and lightheadedness. Pertinent negatives include no vaginal bleeding or chest pain. All other systems negative.    PAST MEDICAL HISTORY   has a past medical history of Cancer (HCC), GERD (gastroesophageal reflux disease), Substance abuse (HCC), and Unspecified urinary incontinence.    SURGICAL HISTORY   has a past surgical history that includes other orthopedic surgery; lumpectomy; other; bladder sling female (12/3/2014); other abdominal  "surgery (02/2018); tubal coagulation laparoscopic bilateral; hysterectomy laparoscopy (2000); yvonne by laparoscopy (8/20/2019); and gastroscopy-endo (N/A, 9/17/2019).    SOCIAL HISTORY  Social History     Tobacco Use    Smoking status: Never Smoker    Smokeless tobacco: Never Used   Substance Use Topics    Alcohol use: Yes     Comment: 1/2 bottle vodka daily    Drug use: No      Social History     Substance and Sexual Activity   Drug Use No       FAMILY HISTORY  Family History   Problem Relation Age of Onset    Diabetes Father     Other Father         parkinsons    Cancer Father         renal     Cancer Other         ovarian    Cancer Maternal Grandmother         ovarian    Hypertension Mother        CURRENT MEDICATIONS  Current Outpatient Medications   Medication Instructions    BIOTIN PO 1 tablet, Oral, DAILY    CALCIUM PO 1 tablet, Oral, DAILY    hydrOXYzine HCl (ATARAX) 25 mg, Oral, NIGHTLY PRN    Multiple Vitamins-Calcium (ONE-A-DAY WOMENS PO) 1 tablet, Oral, DAILY    naltrexone (DEPADE) 50 mg, Oral, DAILY    omeprazole (PRILOSEC) 20 mg, Oral, 2 TIMES DAILY       ALLERGIES  No Known Allergies    PHYSICAL EXAM  VITAL SIGNS: /66   Pulse 88   Temp 36.3 °C (97.3 °F) (Temporal)   Resp 20   Ht 1.676 m (5' 6\")   Wt 74.8 kg (165 lb)   SpO2 98%   BMI 26.63 kg/m²     Constitutional: Well developed, Well nourished, mild distress.   HENT: Normocephalic, Atraumatic. Mask in place.  Eyes: Conjunctiva normal, No discharge.   Cardiovascular: Normal heart rate, Normal rhythm, No murmurs, equal pulses.   Pulmonary: Normal breath sounds, No respiratory distress, No wheezing, No rales, No rhonchi.  Chest: No chest wall tenderness or deformity.   Abdomen: Soft, Mild epigastric tenderness, No masses, no rebound, no guarding.   Rectal: Brown stool, guaiac negative, okay controls  Back: No CVA tenderness.   Musculoskeletal: No major deformities noted, No tenderness. Bruising to right upper thigh and posterior ribs.  Skin: " Warm, Dry, No erythema, No rash.   Neurologic: Alert & oriented x 3, Normal motor function,  No focal deficits noted.   Psychiatric: Affect normal, Judgment normal, Mood normal.       LABS  Results for orders placed or performed during the hospital encounter of 03/29/21   CBC WITH DIFFERENTIAL   Result Value Ref Range    WBC 5.0 4.8 - 10.8 K/uL    RBC 4.22 4.20 - 5.40 M/uL    Hemoglobin 13.6 12.0 - 16.0 g/dL    Hematocrit 39.7 37.0 - 47.0 %    MCV 94.1 81.4 - 97.8 fL    MCH 32.2 27.0 - 33.0 pg    MCHC 34.3 33.6 - 35.0 g/dL    RDW 52.5 (H) 35.9 - 50.0 fL    Platelet Count 164 164 - 446 K/uL    MPV 9.4 9.0 - 12.9 fL    Neutrophils-Polys 52.40 44.00 - 72.00 %    Lymphocytes 33.70 22.00 - 41.00 %    Monocytes 12.10 0.00 - 13.40 %    Eosinophils 1.00 0.00 - 6.90 %    Basophils 0.60 0.00 - 1.80 %    Immature Granulocytes 0.20 0.00 - 0.90 %    Nucleated RBC 0.00 /100 WBC    Neutrophils (Absolute) 2.64 2.00 - 7.15 K/uL    Lymphs (Absolute) 1.70 1.00 - 4.80 K/uL    Monos (Absolute) 0.61 0.00 - 0.85 K/uL    Eos (Absolute) 0.05 0.00 - 0.51 K/uL    Baso (Absolute) 0.03 0.00 - 0.12 K/uL    Immature Granulocytes (abs) 0.01 0.00 - 0.11 K/uL    NRBC (Absolute) 0.00 K/uL   COMP METABOLIC PANEL   Result Value Ref Range    Sodium 133 (L) 135 - 145 mmol/L    Potassium 2.9 (L) 3.6 - 5.5 mmol/L    Chloride 91 (L) 96 - 112 mmol/L    Co2 25 20 - 33 mmol/L    Anion Gap 17.0 (H) 7.0 - 16.0    Glucose 102 (H) 65 - 99 mg/dL    Bun 6 (L) 8 - 22 mg/dL    Creatinine 0.62 0.50 - 1.40 mg/dL    Calcium 8.7 8.5 - 10.5 mg/dL    AST(SGOT) 64 (H) 12 - 45 U/L    ALT(SGPT) 37 2 - 50 U/L    Alkaline Phosphatase 84 30 - 99 U/L    Total Bilirubin 0.5 0.1 - 1.5 mg/dL    Albumin 3.7 3.2 - 4.9 g/dL    Total Protein 6.8 6.0 - 8.2 g/dL    Globulin 3.1 1.9 - 3.5 g/dL    A-G Ratio 1.2 g/dL   LIPASE   Result Value Ref Range    Lipase 84 (H) 11 - 82 U/L   DIAGNOSTIC ALCOHOL   Result Value Ref Range    Diagnostic Alcohol 284.0 (H) 0.0 - 10.0 mg/dL   APTT   Result  Value Ref Range    APTT 25.8 24.7 - 36.0 sec   PROTHROMBIN TIME (INR)   Result Value Ref Range    PT 14.0 12.0 - 14.6 sec    INR 1.05 0.87 - 1.13   COD (ADULT)   Result Value Ref Range    ABO Grouping Only O     Rh Grouping Only POS     Antibody Screen-Cod POS (A)    ESTIMATED GFR   Result Value Ref Range    GFR If African American >60 >60 mL/min/1.73 m 2    GFR If Non African American >60 >60 mL/min/1.73 m 2   ANTIBODY IDENTIFICATION   Result Value Ref Range    Antibody Id POS, anti-M (A)      All labs reviewed by me.    Reviewed labs from 3/26:  Occult blood positive  Sodium 140  Potassium 2.9  Chloride 99  CO2 27  Glucose level 133  BUN 6  Creatinine 0.8  T bili 05  Alk phos 90  AST 47  ALT 36  PT 11.1  PTT 24  WBC 5.9  Hgb 13.9  Hct 40  Plt 196    RADIOLOGY  Reviewed CT from 3/26 which indicated that there is diverticulosis noted in the colon. There is no evidence of diverticulitis. There is mild left sided hydronephrosis however the left ureter is not dilated and there are no obstructing stones appreciated. This could be due to a recently passed stone or possibly a UPJ obstruction. There is a thick walled appearance to the distal esophagus and esophagitis is not excluded.    COURSE & MEDICAL DECISION MAKING  Pertinent Labs & Imaging studies reviewed. (See chart for details)    Reviewed records which indicated that patient was seen here in Janurary 2020. At that time she was admitted for leg swelling and alcohol withdrawal. She was seen in September 2019, taken for EGD with Dr. Ochoa, found to have esophagitis with esophageal ulcer.     PPE Note: I verified that the patient was wearing a mask and I was wearing appropriate PPE every time I entered the room. The patient's mask was on the patient at all times during my encounter except for a brief view of the oropharynx.     Scribe remained outside the patient's room and did not have any contact with the patient for the duration of patient encounter.     6:49 PM -  Patient seen and examined at bedside. Performed rectal exam with female chaperone present. Patient will be treated with Protonix. The patient will receive IV fluids for alcohol intoxication. Ordered CBC with differential, CMP, Lipase, Diagnostic Alcohol, APTT, PT/INR, and COD to evaluate her symptoms. The differential diagnoses include but are not limited to: Esophageal ulcer, gastritis, alcoholic gastritis, alcohol withdrawal thrombocytopenia, anemia, pancreatiitis     9:11 PM - Patient treated with Kdur.    9:47 PM - Patient treated with Zofran.    10:30 PM - Patient was reevaluated at bedside. Discussed lab results with the patient and informed them that her labs have not significantly changed. Patient treated with Ativan.    10:35 PM - I discussed the patient's case and the above findings with Dr. Ochoa (GI) who states that patient had a scope in January. She had gastritis but no esophageal varices. If she is hemodynamically stable she can be discharged on a PPI.    11:19 PM - Patient may return to [facility]. On reexam she has a slight tremor to her hands but not her tongue. Discussed discharge instructions and return precautions with the patient and they were cleared for discharge. Patient was given the opportunity to ask any further questions. She is comfortable with discharge at this time.      There was a positive response to IV fluids after patient reevaluation.    Medical Decision Making: Patient was seen here and evaluated for stating that she is vomiting blood.  Her initial guaiac was negative.  Her hemoglobin is stable at 13.6.  Records obtained from ER visit at outlying facility 3 days ago showed also hemoglobin 13.  Patient does not showed any active vomiting of blood.  She was quite intoxicated and started to get a slight tremor therefore she was given a dose of Ativan and some Librium.  At this point time she can be discharged to follow back up with me on behavioral health for treatment for  her alcoholism.  As far as her vomiting blood I think this is likely secondary to gastritis.  Patient has had an EGD by GI within the last year.  Patient is hemodynamically stable here has not actually vomited any blood that I can see.  I therefore think she can be discharged home on a PPI and follow-up with GI consultants.      The patient will return for new or worsening symptoms and is stable at the time of discharge.    The patient is referred to a primary physician for blood pressure management, diabetic screening, and for all other preventative health concerns.    DISPOSITION:  Patient will be discharged home in stable condition.    FOLLOW UP:  Xavi Ochoa M.D.  [address] [facility] [address]  [phone]    Schedule an appointment as soon as possible for a visit       [facility] [address] [place] [address] [phone]  Today        OUTPATIENT MEDICATIONS:  New Prescriptions    OMEPRAZOLE (PRILOSEC) 20 MG DELAYED-RELEASE CAPSULE    Take 1 capsule by mouth 2 times a day for 30 days.    ONDANSETRON (ZOFRAN ODT) 4 MG TABLET DISPERSIBLE    Take 1 tablet by mouth every 8 hours as needed.        FINAL IMPRESSION  1. Alcoholic intoxication without complication (HCC)    2. Acute alcoholic gastritis with hemorrhage          ISukumar (Raheel), am scribing for, and in the presence of, Abhijeet Pappas M.D.    Electronically signed by: Sukumar Negron (Raheel), 3/29/2021    IAbhijeet M.D. personally performed the services described in this documentation, as scribed by Sukumar Negron in my presence, and it is both accurate and complete.    The note accurately reflects work and decisions made by me.  Abhijeet Pappas M.D.  3/30/2021  1:57 AM

## 2021-03-30 NOTE — ED NOTES
Discharge education provided. Discharge paperwork signed by pt. Prescription x 2 given to pt. All questions answered. All belongings with pt. Pt ambulated to lobby unassisted. Cab voucher given to pt, pt going to [facility].

## 2021-06-15 NOTE — TELEPHONE ENCOUNTER
Received request via: Patient    Was the patient seen in the last year in this department? Yes    Does the patient have an active prescription (recently filled or refills available) for medication(s) requested? No   No appt scheduled at this time

## 2021-06-16 RX ORDER — OMEPRAZOLE 20 MG/1
20 CAPSULE, DELAYED RELEASE ORAL 2 TIMES DAILY
Qty: 30 CAPSULE | Refills: 0 | OUTPATIENT
Start: 2021-06-16

## 2021-06-16 NOTE — TELEPHONE ENCOUNTER
Patient called, stated she has left messages requesting a refill on her Omeprazole but has not heard from anyone. I notified patient that medication was refused, and she needs an appointment. I notified patient it has been over a year that she has been seen. Appointment scheduled with patient for Friday 6/18/2021

## 2021-06-18 ENCOUNTER — OFFICE VISIT (OUTPATIENT)
Dept: MEDICAL GROUP | Facility: MEDICAL CENTER | Age: 56
End: 2021-06-18
Attending: INTERNAL MEDICINE
Payer: MEDICAID

## 2021-06-18 VITALS
DIASTOLIC BLOOD PRESSURE: 82 MMHG | SYSTOLIC BLOOD PRESSURE: 128 MMHG | RESPIRATION RATE: 16 BRPM | TEMPERATURE: 97.7 F | HEIGHT: 66 IN | BODY MASS INDEX: 28.28 KG/M2 | WEIGHT: 176 LBS | HEART RATE: 98 BPM | OXYGEN SATURATION: 98 %

## 2021-06-18 DIAGNOSIS — Z98.84 S/P LAPAROSCOPIC SLEEVE GASTRECTOMY: ICD-10-CM

## 2021-06-18 DIAGNOSIS — Z11.59 SCREENING FOR VIRAL DISEASE: ICD-10-CM

## 2021-06-18 DIAGNOSIS — E78.5 HYPERLIPIDEMIA, UNSPECIFIED HYPERLIPIDEMIA TYPE: ICD-10-CM

## 2021-06-18 DIAGNOSIS — Z78.0 POSTMENOPAUSAL: ICD-10-CM

## 2021-06-18 DIAGNOSIS — K21.00 GASTROESOPHAGEAL REFLUX DISEASE WITH ESOPHAGITIS WITHOUT HEMORRHAGE: ICD-10-CM

## 2021-06-18 DIAGNOSIS — F10.21 ALCOHOLISM IN REMISSION (HCC): ICD-10-CM

## 2021-06-18 DIAGNOSIS — Z13.1 SCREENING FOR DIABETES MELLITUS: ICD-10-CM

## 2021-06-18 DIAGNOSIS — F41.0 PANIC ATTACK: ICD-10-CM

## 2021-06-18 PROBLEM — L29.9 ITCHING: Status: RESOLVED | Noted: 2020-02-19 | Resolved: 2021-06-18

## 2021-06-18 PROCEDURE — 99214 OFFICE O/P EST MOD 30 MIN: CPT | Performed by: INTERNAL MEDICINE

## 2021-06-18 PROCEDURE — 99212 OFFICE O/P EST SF 10 MIN: CPT | Performed by: INTERNAL MEDICINE

## 2021-06-18 RX ORDER — OMEPRAZOLE 20 MG/1
TABLET, DELAYED RELEASE ORAL 2 TIMES DAILY
COMMUNITY
Start: 2021-04-03 | End: 2021-06-18

## 2021-06-18 RX ORDER — BUSPIRONE HYDROCHLORIDE 10 MG/1
10 TABLET ORAL
COMMUNITY
Start: 2021-05-02 | End: 2021-06-18

## 2021-06-18 RX ORDER — OMEPRAZOLE 20 MG/1
20 CAPSULE, DELAYED RELEASE ORAL DAILY
Qty: 30 CAPSULE | Refills: 11 | Status: SHIPPED | OUTPATIENT
Start: 2021-06-18

## 2021-06-18 RX ORDER — HYDROXYZINE PAMOATE 50 MG/1
50 CAPSULE ORAL EVERY 8 HOURS PRN
COMMUNITY
Start: 2021-03-31 | End: 2021-06-18

## 2021-06-18 RX ORDER — HYDROXYZINE HYDROCHLORIDE 25 MG/1
25 TABLET, FILM COATED ORAL NIGHTLY PRN
Qty: 30 TABLET | Refills: 3 | Status: SHIPPED | OUTPATIENT
Start: 2021-06-18 | End: 2022-01-11

## 2021-06-18 RX ORDER — NALTREXONE HYDROCHLORIDE 50 MG/1
50 TABLET, FILM COATED ORAL DAILY
Qty: 30 TABLET | Refills: 5 | Status: SHIPPED | OUTPATIENT
Start: 2021-06-18 | End: 2021-09-15

## 2021-06-18 RX ORDER — PROPRANOLOL HYDROCHLORIDE 10 MG/1
10 TABLET ORAL 3 TIMES DAILY PRN
Qty: 30 TABLET | Refills: 0 | Status: SHIPPED | OUTPATIENT
Start: 2021-06-18 | End: 2021-09-15

## 2021-06-18 ASSESSMENT — FIBROSIS 4 INDEX: FIB4 SCORE: 3.66

## 2021-06-18 NOTE — ASSESSMENT & PLAN NOTE
She reports some anxiety with panic attacks lately.  It has mostly been around a stressful situation with an ex partner.  She is resolving the situation and states that the panic attacks are less frequent but she has had some occurring recently.  She would like an as needed medication to take for anxiety when she feels the panic attacks come on.  She was given a prescription for hydroxyzine but states that it makes her too drowsy.  She was also given BuSpar but she has not taken it because she does not want to be on a daily medication.

## 2021-06-18 NOTE — ASSESSMENT & PLAN NOTE
The 10-year ASCVD risk score (Elena LLAMAS Jr., et al., 2013) is: 3.1%  She is not currently on any lipid-lowering medication.  She is due for updated labs.

## 2021-06-19 NOTE — ASSESSMENT & PLAN NOTE
She has a history of grade D esophagitis and antral ulcers back in December 2020 and recurrent GI bleeding.  She is taking Prilosec 20 mg daily.  She was previously on 20 mg twice daily but she has been able to wean herself down.

## 2021-06-19 NOTE — ASSESSMENT & PLAN NOTE
She did have a short relapse with her drinking but has been sober for 80 days.  She is part of an IOP through [facility].  She would like a refill on her naltrexone although she has not taking it regularly just in case she gets cravings.

## 2021-06-19 NOTE — PROGRESS NOTES
Subjective:   Angélica Beck is a 55 y.o. female here today for anxiety, med refills    Panic attack  She reports some anxiety with panic attacks lately.  It has mostly been around a stressful situation with an ex partner.  She is resolving the situation and states that the panic attacks are less frequent but she has had some occurring recently.  She would like an as needed medication to take for anxiety when she feels the panic attacks come on.  She was given a prescription for hydroxyzine but states that it makes her too drowsy.  She was also given BuSpar but she has not taken it because she does not want to be on a daily medication.    Hyperlipidemia  The 10-year ASCVD risk score (Elena DC Jr., et al., 2013) is: 3.1%  She is not currently on any lipid-lowering medication.  She is due for updated labs.    Alcoholism in remission (HCC)  She did have a short relapse with her drinking but has been sober for 80 days.  She is part of an IOP through [place] [facility].  She would like a refill on her naltrexone although she has not taking it regularly just in case she gets cravings.    Gastroesophageal reflux disease with esophagitis  She has a history of grade D esophagitis and antral ulcers back in December 2020 and recurrent GI bleeding.  She is taking Prilosec 20 mg daily.  She was previously on 20 mg twice daily but she has been able to wean herself down.       Current medicines (including changes today)  Current Outpatient Medications   Medication Sig Dispense Refill   • naltrexone (DEPADE) 50 MG Tab Take 1 tablet by mouth every day. 30 tablet 5   • omeprazole (PRILOSEC) 20 MG delayed-release capsule Take 1 capsule by mouth every day. 30 capsule 11   • propranolol (INDERAL) 10 MG Tab Take 1 tablet by mouth 3 times a day as needed (anxiety). 30 tablet 0   • hydrOXYzine HCl (ATARAX) 25 MG Tab Take 1 tablet by mouth at bedtime as needed (sleep). 30 tablet 3   • CALCIUM PO Take 1 Tab by mouth every day.     •  BIOTIN PO Take 1 Tab by mouth every day.     • Multiple Vitamins-Calcium (ONE-A-DAY WOMENS PO) Take 1 Tab by mouth every day.       No current facility-administered medications for this visit.     She  has a past medical history of Cancer (HCC), GERD (gastroesophageal reflux disease), Substance abuse (HCC), and Unspecified urinary incontinence.    ROS   Denies chest pain, shortness of breath  As above in HPI     Objective:     Vitals:    06/18/21 1533   BP: 128/82   Pulse: 98   Resp: 16   Temp: 36.5 °C (97.7 °F)   SpO2: 98%     Body mass index is 28.41 kg/m².   Physical Exam:  Constitutional: Alert, no distress.  Skin: Warm, dry, good turgor, no rashes in visible areas.  Eye: Equal, round and reactive, conjunctiva clear, lids normal.  ENMT: Lips without lesions, fair dentition, oropharynx clear, TM's clear bilaterally  Neck: Trachea midline, no masses, no thyromegaly. No cervical or supraclavicular lymphadenopathy  Respiratory: Unlabored respiratory effort, lungs clear to auscultation, no wheezes, no ronchi.  Cardiovascular: Regular rate and rhythm, no murmurs appreciated, 2+ non pitting edema with varicosities to knees b/l  Abdomen: Soft, non-tender, no masses, no hepatosplenomegaly.  Psych: Alert and oriented x3, normal affect and mood.      Assessment and Plan:   The following treatment plan was discussed    1. Alcoholism in remission (HCC)  Stable, she is part of an IOP and she is off of naltrexone regularly but would like a prescription just in case she starts to have cravings.  -cont IOP follow up  - naltrexone (DEPADE) 50 MG Tab; Take 1 tablet by mouth every day.  Dispense: 30 tablet; Refill: 5    2. Hyperlipidemia, unspecified hyperlipidemia type  - Lipid Profile; Future    3. Panic attack  Hydroxyzine is too sedating for her.  Her panic attacks are very situational and predictable when she has to interact with a certain person.  We discussed using propranolol prior to stressful event or as needed for panic  attack.  - propranolol (INDERAL) 10 MG Tab; Take 1 tablet by mouth 3 times a day as needed (anxiety).  Dispense: 30 tablet; Refill: 0    4. Gastroesophageal reflux disease with esophagitis without hemorrhage  Stable, no further episodes of GI bleeds.  She recently followed up with GI and was told everything looked good.  We will continue Prilosec but she can wean off if tolerated  - omeprazole (PRILOSEC) 20 MG delayed-release capsule; Take 1 capsule by mouth every day.  Dispense: 30 capsule; Refill: 11    5. Screening for diabetes mellitus  - HEMOGLOBIN A1C; Future    6. Postmenopausal  - VITAMIN D,25 HYDROXY; Future    7. Screening for viral diseas  - HEP C VIRUS ANTIBODY; Future    8. S/P laparoscopic sleeve gastrectomy  - VITAMIN B12; Future  - FERRITIN; Future  - FOLATE; Future  - VITAMIN B1; Future  - VITAMIN B6; Future  - ZINC SERUM; Future  - VITAMIN B2 (RIBOFLAVIN); Future        Followup: Return in about 1 year (around 6/18/2022), or if symptoms worsen or fail to improve, for annual.

## 2021-09-03 ENCOUNTER — HOSPITAL ENCOUNTER (OUTPATIENT)
Dept: LAB | Facility: MEDICAL CENTER | Age: 56
End: 2021-09-03
Attending: INTERNAL MEDICINE
Payer: MEDICAID

## 2021-09-03 DIAGNOSIS — Z78.0 POSTMENOPAUSAL: ICD-10-CM

## 2021-09-03 DIAGNOSIS — Z11.59 SCREENING FOR VIRAL DISEASE: ICD-10-CM

## 2021-09-03 DIAGNOSIS — Z13.1 SCREENING FOR DIABETES MELLITUS: ICD-10-CM

## 2021-09-03 DIAGNOSIS — E78.5 HYPERLIPIDEMIA, UNSPECIFIED HYPERLIPIDEMIA TYPE: ICD-10-CM

## 2021-09-03 LAB
25(OH)D3 SERPL-MCNC: 59 NG/ML (ref 30–100)
CHOLEST SERPL-MCNC: 227 MG/DL (ref 100–199)
FASTING STATUS PATIENT QL REPORTED: NORMAL
HCV AB SER QL: NORMAL
HDLC SERPL-MCNC: 81 MG/DL
LDLC SERPL CALC-MCNC: 133 MG/DL
TRIGL SERPL-MCNC: 67 MG/DL (ref 0–149)

## 2021-09-03 PROCEDURE — 83036 HEMOGLOBIN GLYCOSYLATED A1C: CPT

## 2021-09-03 PROCEDURE — 36415 COLL VENOUS BLD VENIPUNCTURE: CPT

## 2021-09-03 PROCEDURE — 82306 VITAMIN D 25 HYDROXY: CPT

## 2021-09-03 PROCEDURE — 86803 HEPATITIS C AB TEST: CPT

## 2021-09-03 PROCEDURE — 80061 LIPID PANEL: CPT

## 2021-09-04 LAB
EST. AVERAGE GLUCOSE BLD GHB EST-MCNC: 120 MG/DL
HBA1C MFR BLD: 5.8 % (ref 4–5.6)

## 2021-09-08 PROBLEM — R73.03 PREDIABETES: Status: ACTIVE | Noted: 2021-09-08

## 2021-09-15 ENCOUNTER — OFFICE VISIT (OUTPATIENT)
Dept: MEDICAL GROUP | Facility: MEDICAL CENTER | Age: 56
End: 2021-09-15
Attending: INTERNAL MEDICINE
Payer: MEDICAID

## 2021-09-15 VITALS
BODY MASS INDEX: 29.89 KG/M2 | SYSTOLIC BLOOD PRESSURE: 122 MMHG | HEIGHT: 66 IN | TEMPERATURE: 97.9 F | RESPIRATION RATE: 16 BRPM | OXYGEN SATURATION: 98 % | WEIGHT: 186 LBS | DIASTOLIC BLOOD PRESSURE: 82 MMHG | HEART RATE: 86 BPM

## 2021-09-15 DIAGNOSIS — F41.9 ANXIETY: ICD-10-CM

## 2021-09-15 DIAGNOSIS — L98.9 SKIN LESION: ICD-10-CM

## 2021-09-15 DIAGNOSIS — Z85.828 HISTORY OF BASAL CELL CARCINOMA: ICD-10-CM

## 2021-09-15 DIAGNOSIS — E66.9 OBESITY (BMI 30.0-34.9): ICD-10-CM

## 2021-09-15 PROCEDURE — 99213 OFFICE O/P EST LOW 20 MIN: CPT | Performed by: INTERNAL MEDICINE

## 2021-09-15 PROCEDURE — 99214 OFFICE O/P EST MOD 30 MIN: CPT | Performed by: INTERNAL MEDICINE

## 2021-09-15 RX ORDER — ESCITALOPRAM OXALATE 10 MG/1
10 TABLET ORAL DAILY
Qty: 30 TABLET | Refills: 1 | Status: SHIPPED | OUTPATIENT
Start: 2021-09-15 | End: 2021-11-16 | Stop reason: SDUPTHER

## 2021-09-15 RX ORDER — PHENTERMINE HYDROCHLORIDE 15 MG/1
15 CAPSULE ORAL EVERY MORNING
Qty: 30 CAPSULE | Refills: 0 | Status: SHIPPED | OUTPATIENT
Start: 2021-09-15 | End: 2021-10-15

## 2021-09-15 ASSESSMENT — FIBROSIS 4 INDEX: FIB4 SCORE: 3.66

## 2021-09-15 NOTE — ASSESSMENT & PLAN NOTE
She has noticed a raised hypopigmented papule over her left upper lip that has been present for about 9 months.  She states that it has gotten larger in size.  She thinks it started out with a little bit of pigmentation but has now become skin colored.  It is also rough.  She has a history of basal cell carcinoma on her chest which was excised by dermatologist about 15 years ago and she has not had any follow-up since then.  She denies pain in the area, itching, or bleeding.

## 2021-09-15 NOTE — PROGRESS NOTES
Subjective:   Angélica Beck is a 55 y.o. female here today for mole check, anxiety, weight loss    Obesity (BMI 30.0-34.9)  She reports eating a lot of sugary foods since she has become sober approximately 7 months ago.  States she is having difficulty controlling her cravings.  She used to drink alcohol.  She would like to try a months worth of low-dose weight loss medication to see if she can reset.    Skin lesion  She has noticed a raised hypopigmented papule over her left upper lip that has been present for about 9 months.  She states that it has gotten larger in size.  She thinks it started out with a little bit of pigmentation but has now become skin colored.  It is also rough.  She has a history of basal cell carcinoma on her chest which was excised by dermatologist about 15 years ago and she has not had any follow-up since then.  She denies pain in the area, itching, or bleeding.    Anxiety  She continues to report anxiety.  At this point, she would like to try Lexapro as she has friends taking this who has benefited.  In the past, she has only tried as needed medications including hydroxyzine, propranolol, BuSpar, and amitriptyline.  She is not currently taking any of these outside of the hydroxyzine which she will take as needed for sleep.  She is seeing a therapist on a weekly basis.       Current medicines (including changes today)  Current Outpatient Medications   Medication Sig Dispense Refill   • escitalopram (LEXAPRO) 10 MG Tab Take 1 Tablet by mouth every day. 30 Tablet 1   • phentermine 15 MG capsule Take 1 Capsule by mouth every morning for 30 days. 30 Capsule 0   • omeprazole (PRILOSEC) 20 MG delayed-release capsule Take 1 capsule by mouth every day. 30 capsule 11   • hydrOXYzine HCl (ATARAX) 25 MG Tab Take 1 tablet by mouth at bedtime as needed (sleep). 30 tablet 3   • CALCIUM PO Take 1 Tab by mouth every day.     • BIOTIN PO Take 1 Tab by mouth every day.     • Multiple Vitamins-Calcium  (ONE-A-DAY WOMENS PO) Take 1 Tab by mouth every day.       No current facility-administered medications for this visit.     She  has a past medical history of Cancer (HCC), GERD (gastroesophageal reflux disease), Substance abuse (HCC), and Unspecified urinary incontinence.    ROS   Denies chest pain, shortness of breath  As above in HPI     Objective:     Vitals:    09/15/21 0932   BP: 122/82   Pulse: 86   Resp: 16   Temp: 36.6 °C (97.9 °F)   SpO2: 98%     Body mass index is 30.04 kg/m².   Physical Exam:  Constitutional: Alert, no distress.  Skin: Warm, dry, good turgor, no rashes in visible areas, approximately 4 mm raised papule over left upper lip that is skin colored and rough but not verrocous  Eye: Equal, round and reactive, conjunctiva clear, lids normal.  Psych: Alert and oriented x3, normal affect and mood.      Assessment and Plan:   The following treatment plan was discussed    1. History of basal cell carcinoma  Given her history of basal cell carcinoma, somewhat atypical appearance of this lesion as well as its growth, will refer her to dermatology.  Discussed possibility of biopsy but because it is on her face I will defer to Derm.  - REFERRAL TO DERMATOLOGY    2. Anxiety  Uncontrolled.  We will start Lexapro as below with follow-up in 6 weeks  - escitalopram (LEXAPRO) 10 MG Tab; Take 1 Tablet by mouth every day.  Dispense: 30 Tablet; Refill: 1    3. Skin lesion  - REFERRAL TO DERMATOLOGY    4. Obesity (BMI 30.0-34.9)  She would like to try low-dose phentermine for 1 month to try to jumpstart her weight loss.  Side effects were discussed.  - phentermine 15 MG capsule; Take 1 Capsule by mouth every morning for 30 days.  Dispense: 30 Capsule; Refill: 0        Followup: Return in about 6 weeks (around 10/27/2021), or if symptoms worsen or fail to improve.

## 2021-09-15 NOTE — ASSESSMENT & PLAN NOTE
She continues to report anxiety.  At this point, she would like to try Lexapro as she has friends taking this who has benefited.  In the past, she has only tried as needed medications including hydroxyzine, propranolol, BuSpar, and amitriptyline.  She is not currently taking any of these outside of the hydroxyzine which she will take as needed for sleep.  She is seeing a therapist on a weekly basis.

## 2021-09-15 NOTE — ASSESSMENT & PLAN NOTE
She reports eating a lot of sugary foods since she has become sober approximately 7 months ago.  States she is having difficulty controlling her cravings.  She used to drink alcohol.  She would like to try a months worth of low-dose weight loss medication to see if she can reset.

## 2021-11-16 DIAGNOSIS — F41.9 ANXIETY: ICD-10-CM

## 2021-11-16 RX ORDER — ESCITALOPRAM OXALATE 10 MG/1
10 TABLET ORAL DAILY
Qty: 30 TABLET | Refills: 1 | Status: SHIPPED | OUTPATIENT
Start: 2021-11-16 | End: 2022-01-18 | Stop reason: SDUPTHER

## 2021-11-16 NOTE — TELEPHONE ENCOUNTER
Received request via: Patient    Was the patient seen in the last year in this department? Yes    Does the patient have an active prescription (recently filled or refills available) for medication(s) requested? No   Future Appointments       Provider Department [place]    11/24/2021 9:00 AM Dinora Barrera M.D. [facility]

## 2021-12-07 ENCOUNTER — PATIENT MESSAGE (OUTPATIENT)
Dept: MEDICAL GROUP | Facility: MEDICAL CENTER | Age: 56
End: 2021-12-07

## 2021-12-07 DIAGNOSIS — G47.00 INSOMNIA, UNSPECIFIED TYPE: ICD-10-CM

## 2021-12-21 RX ORDER — DOXEPIN HYDROCHLORIDE 10 MG/1
10 CAPSULE ORAL NIGHTLY PRN
Qty: 30 CAPSULE | Refills: 0 | Status: SHIPPED | OUTPATIENT
Start: 2021-12-21 | End: 2022-01-11

## 2022-01-10 ENCOUNTER — PATIENT MESSAGE (OUTPATIENT)
Dept: MEDICAL GROUP | Facility: MEDICAL CENTER | Age: 57
End: 2022-01-10

## 2022-01-10 DIAGNOSIS — G47.00 INSOMNIA, UNSPECIFIED TYPE: ICD-10-CM

## 2022-01-11 PROBLEM — G47.00 INSOMNIA: Status: ACTIVE | Noted: 2022-01-11

## 2022-01-11 RX ORDER — QUETIAPINE FUMARATE 25 MG/1
25-50 TABLET, FILM COATED ORAL NIGHTLY PRN
Qty: 10 TABLET | Refills: 0 | Status: SHIPPED | OUTPATIENT
Start: 2022-01-11 | End: 2022-08-18

## 2022-01-14 ENCOUNTER — PATIENT MESSAGE (OUTPATIENT)
Dept: MEDICAL GROUP | Facility: MEDICAL CENTER | Age: 57
End: 2022-01-14

## 2022-01-17 ENCOUNTER — TELEPHONE (OUTPATIENT)
Dept: MEDICAL GROUP | Facility: MEDICAL CENTER | Age: 57
End: 2022-01-17

## 2022-01-17 DIAGNOSIS — F41.9 ANXIETY: ICD-10-CM

## 2022-01-17 RX ORDER — ESCITALOPRAM OXALATE 10 MG/1
10 TABLET ORAL DAILY
Qty: 30 TABLET | Refills: 1 | OUTPATIENT
Start: 2022-01-17

## 2022-01-18 DIAGNOSIS — F41.9 ANXIETY: ICD-10-CM

## 2022-01-18 RX ORDER — ESCITALOPRAM OXALATE 10 MG/1
10 TABLET ORAL DAILY
Qty: 30 TABLET | Refills: 1 | Status: SHIPPED | OUTPATIENT
Start: 2022-01-18 | End: 2022-01-25 | Stop reason: SDUPTHER

## 2022-01-18 NOTE — TELEPHONE ENCOUNTER
Patient called to follow up on refill. She stated that this a prescription she is not supposed to stop taking and she is completely out. Patient would like a phone call as soon as possible to know if anyone else in office is able to do the refill.

## 2022-01-25 RX ORDER — ESCITALOPRAM OXALATE 10 MG/1
10 TABLET ORAL DAILY
Qty: 30 TABLET | Refills: 11 | Status: SHIPPED | OUTPATIENT
Start: 2022-01-25 | End: 2022-08-18 | Stop reason: DRUGHIGH

## 2022-08-18 ENCOUNTER — OFFICE VISIT (OUTPATIENT)
Dept: MEDICAL GROUP | Facility: MEDICAL CENTER | Age: 57
End: 2022-08-18
Attending: INTERNAL MEDICINE
Payer: MEDICAID

## 2022-08-18 VITALS
HEIGHT: 66 IN | OXYGEN SATURATION: 92 % | BODY MASS INDEX: 29.05 KG/M2 | SYSTOLIC BLOOD PRESSURE: 120 MMHG | TEMPERATURE: 97.5 F | RESPIRATION RATE: 16 BRPM | DIASTOLIC BLOOD PRESSURE: 82 MMHG | HEART RATE: 90 BPM

## 2022-08-18 DIAGNOSIS — G47.00 INSOMNIA, UNSPECIFIED TYPE: ICD-10-CM

## 2022-08-18 DIAGNOSIS — F41.9 ANXIETY: ICD-10-CM

## 2022-08-18 PROCEDURE — 99213 OFFICE O/P EST LOW 20 MIN: CPT | Performed by: INTERNAL MEDICINE

## 2022-08-18 PROCEDURE — 99214 OFFICE O/P EST MOD 30 MIN: CPT | Performed by: INTERNAL MEDICINE

## 2022-08-18 RX ORDER — ZOLPIDEM TARTRATE 5 MG/1
5 TABLET ORAL NIGHTLY PRN
Qty: 30 TABLET | Refills: 0 | Status: SHIPPED | OUTPATIENT
Start: 2022-08-18 | End: 2022-09-09 | Stop reason: DRUGHIGH

## 2022-08-18 RX ORDER — ESCITALOPRAM OXALATE 10 MG/1
15 TABLET ORAL DAILY
Qty: 45 TABLET | Refills: 3 | Status: SHIPPED | OUTPATIENT
Start: 2022-08-18 | End: 2023-04-07

## 2022-08-18 RX ORDER — ESCITALOPRAM OXALATE 20 MG/1
20 TABLET ORAL DAILY
Qty: 30 TABLET | Refills: 5 | Status: SHIPPED | OUTPATIENT
Start: 2022-08-18 | End: 2022-08-18

## 2022-08-18 NOTE — ASSESSMENT & PLAN NOTE
She continues to suffer from severe insomnia.  We have been through many medications, none of which have been very helpful for her.  We have tried trazodone, amitriptyline, doxepin, hydroxyzine, over-the-counter antihistamines and melatonin.  Offered Seroquel which she declined.  Unfortunately, she has started drinking alcohol again to help her fall asleep which she desperately wants to avoid given her history of alcoholism.  She states that she is waking up at 2 AM with racing thoughts and cannot fall back to sleep.  In the past, she has used Ambien and remembers it being helpful.  She would like to restart this.

## 2022-08-18 NOTE — ASSESSMENT & PLAN NOTE
We started her on Lexapro about a year ago.  She states that up until a few months ago it was working great but she started to feel more anxious lately.  She is been taking 10 mg daily.  She is interested in trying an increased dose.

## 2022-08-18 NOTE — PROGRESS NOTES
Subjective:   Angélica Beck is a 56 y.o. female here today for insomnia, anxiety    Insomnia  She continues to suffer from severe insomnia.  We have been through many medications, none of which have been very helpful for her.  We have tried trazodone, amitriptyline, doxepin, hydroxyzine, over-the-counter antihistamines and melatonin.  Offered Seroquel which she declined.  Unfortunately, she has started drinking alcohol again to help her fall asleep which she desperately wants to avoid given her history of alcoholism.  She states that she is waking up at 2 AM with racing thoughts and cannot fall back to sleep.  In the past, she has used Ambien and remembers it being helpful.  She would like to restart this.    Anxiety  We started her on Lexapro about a year ago.  She states that up until a few months ago it was working great but she started to feel more anxious lately.  She is been taking 10 mg daily.  She is interested in trying an increased dose.       Current medicines (including changes today)  Current Outpatient Medications   Medication Sig Dispense Refill    escitalopram (LEXAPRO) 10 MG Tab Take 1.5 Tablets by mouth every day. 45 Tablet 3    zolpidem (AMBIEN) 5 MG Tab Take 1 Tablet by mouth at bedtime as needed for Sleep for up to 30 days. 30 Tablet 0    omeprazole (PRILOSEC) 20 MG delayed-release capsule Take 1 capsule by mouth every day. 30 capsule 11    CALCIUM PO Take 1 Tab by mouth every day.      BIOTIN PO Take 1 Tab by mouth every day.      Multiple Vitamins-Calcium (ONE-A-DAY WOMENS PO) Take 1 Tab by mouth every day.       No current facility-administered medications for this visit.     She  has a past medical history of Cancer ([facility]), GERD (gastroesophageal reflux disease), Substance abuse ([facility]), and Unspecified urinary incontinence.         Objective:     Vitals:    08/18/22 1535   BP: 120/82   Pulse: 90   Resp: 16   Temp: 36.4 °C (97.5 °F)   SpO2: 92%     Body mass index is 29.05 kg/m².   Physical  Exam:  Constitutional: Alert, no distress.  Skin: Warm, dry, good turgor, no rashes in visible areas.  Eye: Equal, round and reactive, conjunctiva clear, lids normal.  Psych: Alert and oriented x3, normal affect and mood.      Assessment and Plan:   The following treatment plan was discussed    1. Anxiety  We will increase her Lexapro to 15 mg.  She will let me know via [employer] how she is doing over the next 4 to 6 weeks.  Could consider further dose increase to 20 mg if needed  - escitalopram (LEXAPRO) 10 MG Tab; Take 1.5 Tablets by mouth every day.  Dispense: 45 Tablet; Refill: 3    2. Insomnia, unspecified type  Uncontrolled.  We will start Ambien.  She will follow-up with me in the next week via [employer] regarding efficacy.  - zolpidem (AMBIEN) 5 MG Tab; Take 1 Tablet by mouth at bedtime as needed for Sleep for up to 30 days.  Dispense: 30 Tablet; Refill: 0        Followup: Return if symptoms worsen or fail to improve.

## 2022-08-22 ENCOUNTER — TELEPHONE (OUTPATIENT)
Dept: MEDICAL GROUP | Facility: MEDICAL CENTER | Age: 57
End: 2022-08-22
Payer: MEDICAID

## 2022-08-22 NOTE — TELEPHONE ENCOUNTER
DOCUMENTATION OF PAR STATUS:    1. Name of Medication & Dose:  Zolpidem 5mg      2. Name of Prescription Coverage Company & phone #: [employer]    3. Date Prior Auth Submitted: 8/22/2022    4. What information was given to obtain insurance decision? ICD 10    5. Prior Auth Status? Pending    6. Patient Notified:N\A

## 2022-08-24 ENCOUNTER — PATIENT MESSAGE (OUTPATIENT)
Dept: MEDICAL GROUP | Facility: MEDICAL CENTER | Age: 57
End: 2022-08-24
Payer: MEDICAID

## 2022-09-09 DIAGNOSIS — G47.00 INSOMNIA, UNSPECIFIED TYPE: ICD-10-CM

## 2022-09-09 RX ORDER — ZOLPIDEM TARTRATE 5 MG/1
5 TABLET ORAL NIGHTLY PRN
Qty: 30 TABLET | Refills: 0 | Status: CANCELLED | OUTPATIENT
Start: 2022-09-09 | End: 2022-10-09

## 2022-09-09 RX ORDER — ZOLPIDEM TARTRATE 10 MG/1
10 TABLET ORAL NIGHTLY PRN
Qty: 30 TABLET | Refills: 2 | Status: SHIPPED | OUTPATIENT
Start: 2022-09-09 | End: 2022-10-09

## 2022-11-17 ENCOUNTER — PATIENT MESSAGE (OUTPATIENT)
Dept: MEDICAL GROUP | Facility: MEDICAL CENTER | Age: 57
End: 2022-11-17
Payer: MEDICAID

## 2022-11-18 ENCOUNTER — PATIENT MESSAGE (OUTPATIENT)
Dept: MEDICAL GROUP | Facility: MEDICAL CENTER | Age: 57
End: 2022-11-18
Payer: MEDICAID

## 2022-11-18 DIAGNOSIS — G47.00 INSOMNIA, UNSPECIFIED TYPE: ICD-10-CM

## 2022-11-18 RX ORDER — ZOLPIDEM TARTRATE 10 MG/1
10 TABLET ORAL NIGHTLY PRN
Qty: 30 TABLET | Refills: 0 | Status: SHIPPED | OUTPATIENT
Start: 2022-11-18 | End: 2022-12-16

## 2022-12-16 ENCOUNTER — OFFICE VISIT (OUTPATIENT)
Dept: MEDICAL GROUP | Facility: MEDICAL CENTER | Age: 57
End: 2022-12-16
Attending: INTERNAL MEDICINE
Payer: MEDICAID

## 2022-12-16 ENCOUNTER — HOSPITAL ENCOUNTER (OUTPATIENT)
Facility: MEDICAL CENTER | Age: 57
End: 2022-12-16
Attending: INTERNAL MEDICINE
Payer: MEDICAID

## 2022-12-16 VITALS
DIASTOLIC BLOOD PRESSURE: 80 MMHG | OXYGEN SATURATION: 96 % | SYSTOLIC BLOOD PRESSURE: 122 MMHG | BODY MASS INDEX: 29.05 KG/M2 | RESPIRATION RATE: 16 BRPM | HEIGHT: 66 IN | TEMPERATURE: 97.1 F | HEART RATE: 86 BPM

## 2022-12-16 DIAGNOSIS — F10.21 ALCOHOLISM IN REMISSION (HCC): ICD-10-CM

## 2022-12-16 DIAGNOSIS — K21.9 GASTROESOPHAGEAL REFLUX DISEASE WITHOUT ESOPHAGITIS: ICD-10-CM

## 2022-12-16 DIAGNOSIS — R73.03 PREDIABETES: ICD-10-CM

## 2022-12-16 DIAGNOSIS — E78.5 HYPERLIPIDEMIA, UNSPECIFIED HYPERLIPIDEMIA TYPE: ICD-10-CM

## 2022-12-16 DIAGNOSIS — Z12.31 SCREENING MAMMOGRAM, ENCOUNTER FOR: ICD-10-CM

## 2022-12-16 DIAGNOSIS — G47.00 INSOMNIA, UNSPECIFIED TYPE: ICD-10-CM

## 2022-12-16 DIAGNOSIS — Z23 NEED FOR VACCINATION: ICD-10-CM

## 2022-12-16 DIAGNOSIS — Z98.84 S/P LAPAROSCOPIC SLEEVE GASTRECTOMY: ICD-10-CM

## 2022-12-16 PROBLEM — D75.89 MACROCYTOSIS WITHOUT ANEMIA: Status: RESOLVED | Noted: 2019-09-17 | Resolved: 2022-12-16

## 2022-12-16 PROBLEM — L98.9 SKIN LESION: Status: RESOLVED | Noted: 2021-09-15 | Resolved: 2022-12-16

## 2022-12-16 PROCEDURE — 99213 OFFICE O/P EST LOW 20 MIN: CPT | Mod: 25 | Performed by: INTERNAL MEDICINE

## 2022-12-16 PROCEDURE — G0480 DRUG TEST DEF 1-7 CLASSES: HCPCS

## 2022-12-16 PROCEDURE — 90471 IMMUNIZATION ADMIN: CPT

## 2022-12-16 PROCEDURE — 80307 DRUG TEST PRSMV CHEM ANLYZR: CPT

## 2022-12-16 PROCEDURE — 99214 OFFICE O/P EST MOD 30 MIN: CPT | Performed by: INTERNAL MEDICINE

## 2022-12-16 RX ORDER — ZOLPIDEM TARTRATE 10 MG/1
10 TABLET ORAL NIGHTLY PRN
Qty: 30 TABLET | Refills: 2 | Status: SHIPPED | OUTPATIENT
Start: 2022-12-18 | End: 2023-01-17

## 2022-12-16 ASSESSMENT — PATIENT HEALTH QUESTIONNAIRE - PHQ9: CLINICAL INTERPRETATION OF PHQ2 SCORE: 0

## 2022-12-16 NOTE — ASSESSMENT & PLAN NOTE
The 10-year ASCVD risk score (Caitlin SOLOMON, et al., 2019) is: 2.3%  Not currently on any lipid-lowering medication

## 2022-12-16 NOTE — PROGRESS NOTES
Subjective:   Angélica Beck is a 57 y.o. female here today for ambien refills, labs    S/P laparoscopic sleeve gastrectomy  Currently takes a multivitamin as well as calcium and biotin.  Is due for a vitamin panel.    Prediabetes  Last A1c was mildly elevated at 5.8 over a year ago.  She is due for updated labs.    Hyperlipidemia  The 10-year ASCVD risk score (Caitlin SOLOMON, et al., 2019) is: 2.3%  Not currently on any lipid-lowering medication    Gastroesophageal reflux disease without esophagitis  Takes Prilosec every day otherwise has severe symptoms of heartburn.    Alcoholism in remission ([facility])  Doing well and has abstained from alcohol since we last talked.    Insomnia  Reports Ambien is working great for her.  She is requesting refills today.  States that some nights she will only take half a tablet if she feels tired but other nights if she feels like her mind is racing she will take a full tablet.  She is getting 8 hours of sleep and feels great.  She denies any side effects.       Current medicines (including changes today)  Current Outpatient Medications   Medication Sig Dispense Refill    [START ON 12/18/2022] zolpidem (AMBIEN) 10 MG Tab Take 1 Tablet by mouth at bedtime as needed for Sleep for up to 30 days. 30 Tablet 2    escitalopram (LEXAPRO) 10 MG Tab Take 1.5 Tablets by mouth every day. 45 Tablet 3    omeprazole (PRILOSEC) 20 MG delayed-release capsule Take 1 capsule by mouth every day. 30 capsule 11    CALCIUM PO Take 1 Tab by mouth every day.      BIOTIN PO Take 1 Tab by mouth every day.      Multiple Vitamins-Calcium (ONE-A-DAY WOMENS PO) Take 1 Tab by mouth every day.       No current facility-administered medications for this visit.     She  has a past medical history of Cancer ([facility]), GERD (gastroesophageal reflux disease), Substance abuse ([facility]), and Unspecified urinary incontinence.         Objective:     Vitals:    12/16/22 0737   BP: 122/80   Pulse: 86   Resp: 16   Temp: 36.2 °C (97.1 °F)    SpO2: 96%     Body mass index is 29.05 kg/m².   Physical Exam:  Constitutional: Alert, no distress.  Skin: Warm, dry, good turgor, no rashes in visible areas.  Eye: Equal, round and reactive, conjunctiva clear, lids normal.  Psych: Alert and oriented x3, normal affect and mood.    Assessment and Plan:   The following treatment plan was discussed    1. Prediabetes  Not currently on any agents for glycemic control.  We will obtain updated labs  - HEMOGLOBIN A1C; Future    2. Hyperlipidemia, unspecified hyperlipidemia type  - Lipid Profile; Future    3. S/P laparoscopic sleeve gastrectomy  - ZINC SERUM; Future  - VITAMIN B2 (RIBOFLAVIN); Future  - FERRITIN; Future  - FOLATE; Future  - VITAMIN D,25 HYDROXY (DEFICIENCY); Future  - VITAMIN B12; Future  - VITAMIN B6; Future  - VITAMIN B1; Future    4. Alcoholism in remission (HCC)  Stable and doing well.  - Comp Metabolic Panel; Future    5. Insomnia, unspecified type  Stable and well-controlled with Ambien.  CSA completed today as well as UDS she will follow-up with me in 3 months for additional refills  - Controlled Substance Treatment Agreement  - PAIN MANAGEMENT PANEL, SCRN W/ RFLX TO QNT; Future  - zolpidem (AMBIEN) 10 MG Tab; Take 1 Tablet by mouth at bedtime as needed for Sleep for up to 30 days.  Dispense: 30 Tablet; Refill: 2    6. Screening mammogram, encounter for  - MA-SCREENING MAMMO BILAT W/TOMOSYNTHESIS W/CAD; Future    7. Gastroesophageal reflux disease   Stable and well-controlled.  -Continue Prilosec 20 mg daily    8. Need for vaccination  - INFLUENZA VACCINE QUAD INJ (PF)        Followup: Return in about 3 months (around 3/16/2023) for ambien refill.

## 2022-12-16 NOTE — ASSESSMENT & PLAN NOTE
Reports Ambien is working great for her.  She is requesting refills today.  States that some nights she will only take half a tablet if she feels tired but other nights if she feels like her mind is racing she will take a full tablet.  She is getting 8 hours of sleep and feels great.  She denies any side effects.

## 2022-12-19 LAB
AMPHET CTO UR CFM-MCNC: NEGATIVE NG/ML
BARBITURATES CTO UR CFM-MCNC: NEGATIVE NG/ML
BENZODIAZ CTO UR CFM-MCNC: NEGATIVE NG/ML
BUPRENORPHINE UR-MCNC: NEGATIVE NG/ML
CANNABINOIDS CTO UR CFM-MCNC: POSITIVE NG/ML
CARISOPRODOL UR-MCNC: NEGATIVE NG/ML
COCAINE CTO UR CFM-MCNC: NEGATIVE NG/ML
DRUG SCREEN COMMENT UR-IMP: NORMAL
ETHYL GLUCURONIDE UR QL SCN: POSITIVE NG/ML
FENTANYL UR-MCNC: NEGATIVE NG/ML
MEPERIDINE CTO UR SCN-MCNC: NEGATIVE NG/ML
METHADONE CTO UR CFM-MCNC: NEGATIVE NG/ML
OPIATES UR QL SCN: POSITIVE NG/ML
OXYCDOXYM URSCRN 2005102: NEGATIVE NG/ML
PCP CTO UR CFM-MCNC: NEGATIVE NG/ML
PROPOXYPH CTO UR CFM-MCNC: NEGATIVE NG/ML
TAPENTADOL UR-MCNC: NEGATIVE NG/ML
TRAMADOL CTO UR SCN-MCNC: NEGATIVE NG/ML
ZOLPIDEM UR-MCNC: NEGATIVE NG/ML

## 2022-12-21 LAB
6MAM UR CFM-MCNC: <10 NG/ML
CODEINE UR CFM-MCNC: <20 NG/ML
ETHYL GLUCURONIDE UR CFM-MCNC: NORMAL NG/ML
ETHYL SULFATE UR CFM-MCNC: 5961 NG/ML
HYDROCODONE UR CFM-MCNC: 421 NG/ML
HYDROMORPHONE UR CFM-MCNC: 36 NG/ML
MORPHINE UR CFM-MCNC: <20 NG/ML
NORHYDROCODONE UR CFM-MCNC: 2404 NG/ML
NOROXYCODONE UR CFM-MCNC: <20 NG/ML
OPIATES UR NOROXYM Q0836: <20 NG/ML
OXYCODONE UR CFM-MCNC: <20 NG/ML
OXYMORPHONE UR CFM-MCNC: <20 NG/ML
THC UR CFM-MCNC: <15 NG/ML

## 2023-03-06 ENCOUNTER — PATIENT MESSAGE (OUTPATIENT)
Dept: MEDICAL GROUP | Facility: MEDICAL CENTER | Age: 58
End: 2023-03-06
Payer: MEDICAID

## 2023-03-06 DIAGNOSIS — G47.00 INSOMNIA, UNSPECIFIED TYPE: ICD-10-CM

## 2023-03-09 RX ORDER — QUETIAPINE FUMARATE 25 MG/1
25 TABLET, FILM COATED ORAL NIGHTLY PRN
Qty: 30 TABLET | Refills: 0 | Status: SHIPPED | OUTPATIENT
Start: 2023-03-09 | End: 2024-01-05

## 2023-03-09 NOTE — PATIENT COMMUNICATION
Patient called today, stated she sent a mychart messages on Monday and left a voice messages yesterday and no one has replied to her. I notified patient that Dr Barrera is out of the office.   Patient is requesting a refill on Quetiapine that Dr Barrera prescribed to her about a year ago. I notified patient that I only had one provider in office today, and she requested if we could ask if this can be refilled. I notified patient I can send a message and ask, if not, she would have to wait for Dr Barrera to come back to the office.

## 2023-03-13 ENCOUNTER — APPOINTMENT (OUTPATIENT)
Dept: RADIOLOGY | Facility: MEDICAL CENTER | Age: 58
End: 2023-03-13
Attending: INTERNAL MEDICINE
Payer: MEDICAID

## 2023-03-13 DIAGNOSIS — Z12.31 SCREENING MAMMOGRAM, ENCOUNTER FOR: ICD-10-CM

## 2023-03-13 PROCEDURE — 77067 SCR MAMMO BI INCL CAD: CPT

## 2023-04-07 DIAGNOSIS — F41.9 ANXIETY: ICD-10-CM

## 2023-04-07 RX ORDER — ESCITALOPRAM OXALATE 10 MG/1
TABLET ORAL
Qty: 45 TABLET | Refills: 5 | Status: SHIPPED | OUTPATIENT
Start: 2023-04-07

## 2023-12-08 ENCOUNTER — APPOINTMENT (RX ONLY)
Dept: URBAN - METROPOLITAN AREA CLINIC 31 | Facility: CLINIC | Age: 58
Setting detail: DERMATOLOGY
End: 2023-12-08

## 2023-12-08 DIAGNOSIS — L81.4 OTHER MELANIN HYPERPIGMENTATION: ICD-10-CM

## 2023-12-08 DIAGNOSIS — Z71.89 OTHER SPECIFIED COUNSELING: ICD-10-CM

## 2023-12-08 DIAGNOSIS — D22 MELANOCYTIC NEVI: ICD-10-CM

## 2023-12-08 DIAGNOSIS — D18.0 HEMANGIOMA: ICD-10-CM

## 2023-12-08 DIAGNOSIS — Z85.828 PERSONAL HISTORY OF OTHER MALIGNANT NEOPLASM OF SKIN: ICD-10-CM

## 2023-12-08 DIAGNOSIS — L82.1 OTHER SEBORRHEIC KERATOSIS: ICD-10-CM

## 2023-12-08 PROBLEM — D18.01 HEMANGIOMA OF SKIN AND SUBCUTANEOUS TISSUE: Status: ACTIVE | Noted: 2023-12-08

## 2023-12-08 PROBLEM — D22.5 MELANOCYTIC NEVI OF TRUNK: Status: ACTIVE | Noted: 2023-12-08

## 2023-12-08 PROCEDURE — 99203 OFFICE O/P NEW LOW 30 MIN: CPT

## 2023-12-08 PROCEDURE — ? COUNSELING

## 2023-12-08 ASSESSMENT — LOCATION SIMPLE DESCRIPTION DERM
LOCATION SIMPLE: CHEST
LOCATION SIMPLE: RIGHT UPPER BACK

## 2023-12-08 ASSESSMENT — LOCATION DETAILED DESCRIPTION DERM
LOCATION DETAILED: RIGHT INFERIOR UPPER BACK
LOCATION DETAILED: RIGHT MEDIAL UPPER BACK
LOCATION DETAILED: UPPER STERNUM
LOCATION DETAILED: RIGHT SUPERIOR UPPER BACK

## 2023-12-08 ASSESSMENT — LOCATION ZONE DERM: LOCATION ZONE: TRUNK

## 2024-01-05 ENCOUNTER — OFFICE VISIT (OUTPATIENT)
Dept: MEDICAL GROUP | Facility: MEDICAL CENTER | Age: 59
End: 2024-01-05
Attending: FAMILY MEDICINE
Payer: MEDICAID

## 2024-01-05 VITALS
OXYGEN SATURATION: 96 % | HEART RATE: 84 BPM | RESPIRATION RATE: 16 BRPM | TEMPERATURE: 97.4 F | DIASTOLIC BLOOD PRESSURE: 80 MMHG | HEIGHT: 66 IN | BODY MASS INDEX: 28.12 KG/M2 | WEIGHT: 175 LBS | SYSTOLIC BLOOD PRESSURE: 126 MMHG

## 2024-01-05 DIAGNOSIS — R73.03 PREDIABETES: ICD-10-CM

## 2024-01-05 DIAGNOSIS — F41.0 PANIC ATTACK: ICD-10-CM

## 2024-01-05 DIAGNOSIS — R25.3 JERKING: ICD-10-CM

## 2024-01-05 DIAGNOSIS — Z23 NEED FOR VACCINATION: ICD-10-CM

## 2024-01-05 DIAGNOSIS — G47.00 INSOMNIA, UNSPECIFIED TYPE: ICD-10-CM

## 2024-01-05 PROCEDURE — 3074F SYST BP LT 130 MM HG: CPT | Performed by: FAMILY MEDICINE

## 2024-01-05 PROCEDURE — 3079F DIAST BP 80-89 MM HG: CPT | Performed by: FAMILY MEDICINE

## 2024-01-05 PROCEDURE — 99214 OFFICE O/P EST MOD 30 MIN: CPT | Performed by: FAMILY MEDICINE

## 2024-01-05 PROCEDURE — 90686 IIV4 VACC NO PRSV 0.5 ML IM: CPT

## 2024-01-05 PROCEDURE — 90471 IMMUNIZATION ADMIN: CPT

## 2024-01-05 PROCEDURE — 99213 OFFICE O/P EST LOW 20 MIN: CPT | Performed by: FAMILY MEDICINE

## 2024-01-05 RX ORDER — HYDROXYZINE HYDROCHLORIDE 25 MG/1
12.5-5 TABLET, FILM COATED ORAL 3 TIMES DAILY PRN
Qty: 60 TABLET | Refills: 0 | Status: SHIPPED | OUTPATIENT
Start: 2024-01-05

## 2024-01-05 ASSESSMENT — PATIENT HEALTH QUESTIONNAIRE - PHQ9: CLINICAL INTERPRETATION OF PHQ2 SCORE: 0

## 2024-01-05 NOTE — ASSESSMENT & PLAN NOTE
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   Tried ambien, per records this was helpful. She did not return for refills  She had quetiapine, but she is uncomfortable with the fact that this was also for schizophrenia.   Has tried melatonin - 15mg - helps to put her to sleep, but doesn't last  Has never tried hydroxyzine  Reports some leg jerking before sleep but also present during the day

## 2024-01-08 NOTE — ASSESSMENT & PLAN NOTE
Patient was previously on Lexapro.  She reports that she did not get a refill and has been off of it for the last 2 weeks.  She did not have any symptoms coming off of the medication.  She reports that she feels no different being off of the Lexapro, so she is wondering if she can stop the medication entirely.  Per records, this was started for anxiety.

## 2024-01-08 NOTE — PROGRESS NOTES
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   Tried ambien, per records this was helpful. She did not return for refills  She had quetiapine, but she is uncomfortable with the fact that this was also for schizophrenia.   Has tried melatonin - 15mg - helps to put her to sleep, but doesn't last  Has never tried hydroxyzine  Reports some leg jerking before sleep but also present during the day      Panic attack  Patient was previously on Lexapro.  She reports that she did not get a refill and has been off of it for the last 2 weeks.  She did not have any symptoms coming off of the medication.  She reports that she feels no different being off of the Lexapro, so she is wondering if she can stop the medication entirely.  Per records, this was started for anxiety.      Past Medical History:   Diagnosis Date    Cancer (HCC)     basal cell chest    GERD (gastroesophageal reflux disease)     Substance abuse (HCC)     Unspecified urinary incontinence        Social History     Tobacco Use    Smoking status: Never    Smokeless tobacco: Never   Vaping Use    Vaping Use: Never used   Substance Use Topics    Alcohol use: Yes     Comment: 1/2 bottle vodka daily    Drug use: No       Current Outpatient Medications Ordered in Epic   Medication Sig Dispense Refill    hydrOXYzine HCl (ATARAX) 25 MG Tab Take 0.5-2 Tablets by mouth 3 times a day as needed (insomnia). 60 Tablet 0    escitalopram (LEXAPRO) 10 MG Tab TAKE 1 & 1/2 (ONE & ONE-HALF) TABLETS BY MOUTH ONCE DAILY 45 Tablet 5    omeprazole (PRILOSEC) 20 MG delayed-release capsule Take 1 capsule by mouth  "every day. 30 capsule 11    CALCIUM PO Take 1 Tab by mouth every day.      BIOTIN PO Take 1 Tab by mouth every day.      Multiple Vitamins-Calcium (ONE-A-DAY WOMENS PO) Take 1 Tab by mouth every day. (Patient not taking: Reported on 1/5/2024)       No current [facility]-ordered facility-administered medications on file.       Allergies:  Patient has no known allergies.        Objective:       Exam:  /80 (BP Location: Left arm, Patient Position: Sitting)   Pulse 84   Temp 36.3 °C (97.4 °F) (Temporal)   Resp 16   Ht 1.676 m (5' 6\")   Wt 79.4 kg (175 lb)   SpO2 96%   BMI 28.25 kg/m²  Body mass index is 28.25 kg/m².    General: Normal appearing. No distress.  Pulmonary: Clear to ausculation.  Normal effort. No rales, ronchi, or wheezing.  Cardiovascular: Regular rate and rhythm without murmur.   Psych: Normal mood and affect.       Data reviewed:   Reviewed prior PCP notes regarding Ambien, insomnia.    Assessment & Plan:     58 y.o. female with the following -     1. Insomnia, unspecified type  - hydrOXYzine HCl (ATARAX) 25 MG Tab; Take 0.5-2 Tablets by mouth 3 times a day as needed (insomnia).  Dispense: 60 Tablet; Refill: 0  Patient is willing to try hydroxyzine.  Reports of jerking, may be related to restless leg syndrome, however we did not have time to fully discuss this today as patient had multiple questions.  Assessing her for anemia as below if this is restless leg syndrome.  Could potentially treat that in the future.  Pt was given sleep hygiene handout. Patient will discuss other options if this does not help in 2 months time    2. Panic attack  Trial off of Lexapro.  I advised her that if her sleep starts getting worse, anxiety gets worse, we can consider continuing Lexapro, increasing dose, or another antidepressant.    2. Prediabetes  - HEMOGLOBIN A1C; Future  - Lipid Profile; Future  - Basic Metabolic Panel; Future  A1c was done 2.5 years ago, and was prediabetes.  Will recheck.  Patient was " asking about Ozempic for weight loss.  Advised her we would not be able to obtain unless she has a diagnosis of diabetes and has also completed a trial of metformin.  Obtain labs as above.    3. Jerking  - CBC WITHOUT DIFFERENTIAL; Future  Check for anemia.  Discussed restless leg syndrome further at next visit    4. Need for vaccination  - Tdap =>8yo IM  - Influenza Vaccine Quad Injection (PF)      Return in about 2 months (around 3/5/2024).    Please note that this dictation was created using voice recognition software. I have made every reasonable attempt to correct obvious errors, but I expect that there are errors of grammar and possibly content that I did not discover before finalizing the note.

## 2024-01-17 ENCOUNTER — HOSPITAL ENCOUNTER (OUTPATIENT)
Facility: MEDICAL CENTER | Age: 59
End: 2024-01-17
Attending: FAMILY MEDICINE
Payer: MEDICAID

## 2024-01-17 ENCOUNTER — OFFICE VISIT (OUTPATIENT)
Dept: MEDICAL GROUP | Facility: MEDICAL CENTER | Age: 59
End: 2024-01-17
Attending: FAMILY MEDICINE
Payer: MEDICAID

## 2024-01-17 VITALS
TEMPERATURE: 97 F | RESPIRATION RATE: 14 BRPM | OXYGEN SATURATION: 96 % | BODY MASS INDEX: 28.12 KG/M2 | HEART RATE: 88 BPM | HEIGHT: 66 IN | SYSTOLIC BLOOD PRESSURE: 120 MMHG | WEIGHT: 175 LBS | DIASTOLIC BLOOD PRESSURE: 80 MMHG

## 2024-01-17 DIAGNOSIS — E66.3 OVERWEIGHT: ICD-10-CM

## 2024-01-17 PROCEDURE — 3074F SYST BP LT 130 MM HG: CPT | Performed by: FAMILY MEDICINE

## 2024-01-17 PROCEDURE — 99213 OFFICE O/P EST LOW 20 MIN: CPT | Performed by: FAMILY MEDICINE

## 2024-01-17 PROCEDURE — 80307 DRUG TEST PRSMV CHEM ANLYZR: CPT

## 2024-01-17 PROCEDURE — G0480 DRUG TEST DEF 1-7 CLASSES: HCPCS

## 2024-01-17 PROCEDURE — 3079F DIAST BP 80-89 MM HG: CPT | Performed by: FAMILY MEDICINE

## 2024-01-17 RX ORDER — PHENTERMINE HYDROCHLORIDE 15 MG/1
15 CAPSULE ORAL EVERY MORNING
Qty: 30 CAPSULE | Refills: 0 | Status: SHIPPED | OUTPATIENT
Start: 2024-01-17 | End: 2024-02-16

## 2024-01-18 NOTE — ASSESSMENT & PLAN NOTE
Pt reports that she stopped drinking about 3-4 weeks ago. She reports that she has been eating a lot of sugar. A few years ago she has taken phentermine 15mg for 1 month to help jump start the process of losing weight. It worked well for her. No side effects previously   Plans to work on increasing vegetable intake and cutting sugar intake.   Doesn't exercise but very active at work

## 2024-01-19 LAB
AMPHET CTO UR CFM-MCNC: NEGATIVE NG/ML
BARBITURATES CTO UR CFM-MCNC: NEGATIVE NG/ML
BENZODIAZ CTO UR CFM-MCNC: NEGATIVE NG/ML
BUPRENORPHINE UR-MCNC: NEGATIVE NG/ML
CANNABINOIDS CTO UR CFM-MCNC: NEGATIVE NG/ML
CARISOPRODOL UR-MCNC: NEGATIVE NG/ML
COCAINE CTO UR CFM-MCNC: NEGATIVE NG/ML
CREAT UR-MCNC: 22.6 MG/DL (ref 20–400)
DRUG SCREEN COMMENT UR-IMP: NORMAL
ETHYL GLUCURONIDE UR QL SCN: NORMAL NG/ML
FENTANYL UR-MCNC: NEGATIVE NG/ML
MEPERIDINE CTO UR SCN-MCNC: NEGATIVE NG/ML
METHADONE CTO UR CFM-MCNC: NEGATIVE NG/ML
OPIATES UR QL SCN: NEGATIVE NG/ML
OXYCDOXYM URSCRN 2005102: NEGATIVE NG/ML
PCP CTO UR CFM-MCNC: NEGATIVE NG/ML
PROPOXYPH CTO UR CFM-MCNC: NEGATIVE NG/ML
TAPENTADOL UR-MCNC: NEGATIVE NG/ML
TRAMADOL CTO UR SCN-MCNC: NEGATIVE NG/ML
ZOLPIDEM UR-MCNC: NEGATIVE NG/ML

## 2024-01-19 NOTE — PROGRESS NOTES
"Subjective:     CC:   Chief Complaint   Patient presents with    Medication Refill         HPI:     Overweight  Pt reports that she stopped drinking about 3-4 weeks ago. She reports that she has been eating a lot of sugar. A few years ago she has taken phentermine 15mg for 1 month to help jump start the process of losing weight. It worked well for her. No side effects previously   Plans to work on increasing vegetable intake and cutting sugar intake.   Doesn't exercise but very active at work     Past Medical History:   Diagnosis Date    Cancer (HCC)     basal cell chest    GERD (gastroesophageal reflux disease)     Substance abuse (HCC)     Unspecified urinary incontinence        Social History     Tobacco Use    Smoking status: Never    Smokeless tobacco: Never   Vaping Use    Vaping Use: Never used   Substance Use Topics    Alcohol use: Yes     Comment: 1/2 bottle vodka daily    Drug use: No       Current Outpatient Medications Ordered in Epic   Medication Sig Dispense Refill    phentermine 15 MG capsule Take 1 Capsule by mouth every morning for 30 days. 30 Capsule 0    hydrOXYzine HCl (ATARAX) 25 MG Tab Take 0.5-2 Tablets by mouth 3 times a day as needed (insomnia). 60 Tablet 0    escitalopram (LEXAPRO) 10 MG Tab TAKE 1 & 1/2 (ONE & ONE-HALF) TABLETS BY MOUTH ONCE DAILY 45 Tablet 5    omeprazole (PRILOSEC) 20 MG delayed-release capsule Take 1 capsule by mouth every day. 30 capsule 11    CALCIUM PO Take 1 Tab by mouth every day.      BIOTIN PO Take 1 Tab by mouth every day.       No current [facility]-ordered facility-administered medications on file.       Allergies:  Patient has no known allergies.        Objective:       Exam:  /80 (BP Location: Left arm, Patient Position: Sitting)   Pulse 88   Temp 36.1 °C (97 °F) (Temporal)   Resp 14   Ht 1.676 m (5' 6\")   Wt 79.4 kg (175 lb)   SpO2 96%   BMI 28.25 kg/m²  Body mass index is 28.25 kg/m².    General: Normal appearing. No distress.  Pulmonary: Clear to " ausculation.  Normal effort. No rales, ronchi, or wheezing.  Cardiovascular: Regular rate and rhythm without murmur.   Psych: Normal mood and affect.       Data reviewed:   Reviewed prior use of phentermine via PDMP and also PCP notes    Assessment & Plan:     58 y.o. female with the following -     1. Overweight  - Controlled Substance Treatment Agreement  - PAIN MANAGEMENT SCRN, W/ RFLX TO QNT; Future  - phentermine 15 MG capsule; Take 1 Capsule by mouth every morning for 30 days.  Dispense: 30 Capsule; Refill: 0  Treatment agreement signed, UDS collected today  Reasonable to try since this helped her last visit. She was on phentermine 15mg x 30 days, will repeat same.       Return if symptoms worsen or fail to improve.    Please note that this dictation was created using voice recognition software. I have made every reasonable attempt to correct obvious errors, but I expect that there are errors of grammar and possibly content that I did not discover before finalizing the note.

## 2024-01-22 LAB
ETHYL GLUCURONIDE UR CFM-MCNC: 1565 NG/ML
ETHYL SULFATE UR CFM-MCNC: 549 NG/ML

## 2025-06-05 ENCOUNTER — OFFICE VISIT (OUTPATIENT)
Dept: URGENT CARE | Facility: CLINIC | Age: 60
End: 2025-06-05
Payer: COMMERCIAL

## 2025-06-05 VITALS
OXYGEN SATURATION: 97 % | WEIGHT: 176 LBS | RESPIRATION RATE: 16 BRPM | HEART RATE: 87 BPM | HEIGHT: 66 IN | SYSTOLIC BLOOD PRESSURE: 124 MMHG | DIASTOLIC BLOOD PRESSURE: 84 MMHG | BODY MASS INDEX: 28.28 KG/M2 | TEMPERATURE: 97.9 F

## 2025-06-05 DIAGNOSIS — K13.70 LESION OF ORAL MUCOSA: ICD-10-CM

## 2025-06-05 DIAGNOSIS — F10.10 ALCOHOL ABUSE: ICD-10-CM

## 2025-06-05 DIAGNOSIS — Z86.2 HISTORY OF IRON DEFICIENCY ANEMIA: ICD-10-CM

## 2025-06-05 DIAGNOSIS — Z85.828 HISTORY OF BASAL CELL CARCINOMA: ICD-10-CM

## 2025-06-05 DIAGNOSIS — Z72.0 VAPES NICOTINE CONTAINING SUBSTANCE: ICD-10-CM

## 2025-06-05 DIAGNOSIS — K14.8 LESION OF TONGUE: Primary | ICD-10-CM

## 2025-06-05 PROCEDURE — 99204 OFFICE O/P NEW MOD 45 MIN: CPT | Mod: 25

## 2025-06-05 PROCEDURE — 99407 BEHAV CHNG SMOKING > 10 MIN: CPT

## 2025-06-05 PROCEDURE — 3074F SYST BP LT 130 MM HG: CPT

## 2025-06-05 PROCEDURE — 3079F DIAST BP 80-89 MM HG: CPT

## 2025-06-05 RX ORDER — CHLORHEXIDINE GLUCONATE ORAL RINSE 1.2 MG/ML
15 SOLUTION DENTAL 2 TIMES DAILY
Qty: 118 ML | Refills: 0 | Status: SHIPPED | OUTPATIENT
Start: 2025-06-05

## 2025-06-05 RX ORDER — DEXAMETHASONE 0.5 MG/5ML
ELIXIR ORAL
Qty: 237 ML | Refills: 0 | Status: SHIPPED | OUTPATIENT
Start: 2025-06-05

## 2025-06-05 ASSESSMENT — ENCOUNTER SYMPTOMS
ABDOMINAL PAIN: 0
FEVER: 0
SORE THROAT: 0
NECK PAIN: 0
COUGH: 0
VOMITING: 0
CHILLS: 0
NAUSEA: 0
STRIDOR: 0

## 2025-06-05 NOTE — PROGRESS NOTES
"  Subjective:   CHIEF COMPLAINT  Chief Complaint   Patient presents with    Blisters     Patient coming in for blisters located on tongue x 1 week          Angélica Beck is a very pleasant 59 y.o. female who presents for Blisters (Patient coming in for blisters located on tongue x 1 week )      Patient presents with complaints of possible blisters and lesions on her tongue, inside cheek, and lip.  States that symptoms were first identified about a week ago though she thinks may have started approximately 2 weeks ago as she was having some pain and tenderness in her mouth around then.  She denies any fever chills or bodyaches, no new medications, no new foods, and denies any throat pain cough or other concomitant symptoms.  She has never had symptoms like this before.  Of note patient does use EtOH daily as well as has a history of basal cell carcinoma.  She currently denies taking any other medications other than \"biotin hair stuff.\"  She denies any discharge or drainage from the lesions, states they have not been able to come off with wiping, and are painful        Review of Systems   Constitutional:  Negative for chills and fever.   HENT:  Negative for congestion and sore throat.         Mouth pain.     Respiratory:  Negative for cough and stridor.    Gastrointestinal:  Negative for abdominal pain, nausea and vomiting.   Musculoskeletal:  Negative for neck pain.   Skin:  Positive for rash (Lesions in mouth). Negative for itching.   All other systems reviewed and are negative.    Refer to HPI for additional details.    During this visit, appropriate PPE was worn, and hand hygiene was performed.    PMH:  has a past medical history of Cancer ([facility]), GERD (gastroesophageal reflux disease), Substance abuse ([facility]), and Unspecified urinary incontinence.    MEDS: Current Medications[1]    ALLERGIES: Allergies[2]  SURGHX: Past Surgical History[3]  SOCHX:  reports that she has never smoked. She has never used smokeless " "tobacco. She reports current alcohol use. She reports that she does not use drugs.    FH: Per HPI as applicable/pertinent.    Medications, Allergies, and current problem list reviewed today in Epic.     Objective:     /84   Pulse 87   Temp 36.6 °C (97.9 °F)   Resp 16   Ht 1.676 m (5' 6\")   Wt 79.8 kg (176 lb)   SpO2 97%     Physical Exam  Vitals reviewed.   Constitutional:       General: She is not in acute distress.     Appearance: She is normal weight. She is ill-appearing (Chronically ill-appearing).   HENT:      Head: Normocephalic and atraumatic.      Right Ear: Tympanic membrane, ear canal and external ear normal.      Left Ear: Tympanic membrane, ear canal and external ear normal.      Nose: No congestion or rhinorrhea.      Mouth/Throat:      Lips: Lesions present.      Mouth: Mucous membranes are pale and dry. Oral lesions present. No injury, lacerations or angioedema.      Dentition: Abnormal dentition. Does not have dentures. Dental caries present. No dental tenderness, gingival swelling, dental abscesses or gum lesions.      Tongue: Lesions present. Tongue does not deviate from midline.      Palate: No mass and lesions.      Pharynx: Posterior oropharyngeal erythema present. No pharyngeal swelling, oropharyngeal exudate, uvula swelling or postnasal drip.      Tonsils: No tonsillar exudate or tonsillar abscesses. 0 on the right. 0 on the left.        Comments: Multiple white patchy lesions in the above marked area.  Do not dissipate with tongue stretching, are not easily wiped off with gauze or able to remove.  Very tender to touch  Eyes:      General:         Right eye: No discharge.         Left eye: No discharge.      Pupils: Pupils are equal, round, and reactive to light.   Cardiovascular:      Rate and Rhythm: Normal rate.   Pulmonary:      Effort: Pulmonary effort is normal. No respiratory distress.      Breath sounds: No stridor. No wheezing, rhonchi or rales.   Chest:      Chest wall: " No tenderness.   Abdominal:      General: Abdomen is flat.   Musculoskeletal:      Cervical back: Normal range of motion. No rigidity or tenderness.   Lymphadenopathy:      Cervical: No cervical adenopathy.   Neurological:      Mental Status: She is alert.         Assessment/Plan:     Diagnosis and associated orders:     1. Lesion of tongue  - dexamethasone (DECADRON) 0.5 MG/5ML Elixir; 5 mL swish and spit three to four times daily. It is important to keep the medication in the mouth for five minutes prior to spitting it out. Do not rinse afterward and avoid eating or drinking for 30 minutes.  Dispense: 237 mL; Refill: 0  - chlorhexidine (PERIDEX) 0.12 % Solution; Take 15 mL by mouth 2 times a day.  Dispense: 118 mL; Refill: 0  - Referral to ENT  - CBC WITH DIFFERENTIAL; Future  - Comp Metabolic Panel; Future  - FOLATE; Future  - VITAMIN B12; Future  - HEPATIC FUNCTION PANEL; Future  - EBV BY QUANTIATIVE NAAT, PLASMA; Future  - HIV AG/AB COMBO ASSAY SCREENING; Future    2. Lesion of oral mucosa  - dexamethasone (DECADRON) 0.5 MG/5ML Elixir; 5 mL swish and spit three to four times daily. It is important to keep the medication in the mouth for five minutes prior to spitting it out. Do not rinse afterward and avoid eating or drinking for 30 minutes.  Dispense: 237 mL; Refill: 0  - chlorhexidine (PERIDEX) 0.12 % Solution; Take 15 mL by mouth 2 times a day.  Dispense: 118 mL; Refill: 0  - Referral to ENT  - CBC WITH DIFFERENTIAL; Future  - Comp Metabolic Panel; Future  - FOLATE; Future  - VITAMIN B12; Future  - HEPATIC FUNCTION PANEL; Future  - EBV BY QUANTIATIVE NAAT, PLASMA; Future  - HIV AG/AB COMBO ASSAY SCREENING; Future    3. Alcohol abuse  - CBC WITH DIFFERENTIAL; Future  - Comp Metabolic Panel; Future  - FOLATE; Future  - VITAMIN B12; Future  - HEPATIC FUNCTION PANEL; Future    4. History of basal cell carcinoma  - CBC WITH DIFFERENTIAL; Future    5. History of iron deficiency anemia  - CBC WITH DIFFERENTIAL;  "Future  - FOLATE; Future  - VITAMIN B12; Future    6. 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  I discussed HPI, past medical history and physical exam with patient.  At this time I have very high suspicion for leukoplakia given characteristics of lesions, as well as the patient's past medical history of basal cell carcinoma and history of chronic EtOH abuse.  There was some suspicion for possible oral thrush given that patient had UTI recently was on antibiotics, though presentation is not consistent with oral thrush, and patient states she did not take her full dose as she \"does not like antibiotics.\"   I discussed patient's alcohol use and past medical history.  Patient still currently drinking approximately 5-7 drinks per day.  We discussed cessation and appropriate way to detox.  I did not recommend her stopping cold turkey as she states she has \"detoxed at home.\"  I did advise that she seek medical attention if she is planning on stopping to safely withdrawal.  I advised that she make appointment with her PCP soon as possible.  I will put in labs at this time including HIV testing, EBV, as well as CMP and B vitamins and follow-up as the results come in.  Advised patient to keep close monitoring of lesions and if they change in any way and aggravating or relieving factors.  Will write for Decadron oral elixir as well as chlorhexidine for both pain management as well as antimicrobial help.  Will place referral to ENT for further evaluation, will hold off on oncology referral until other oral White lesion etiologies are ruled out  Strict RTC and ED precautions discussed with patient.  She states she understands plan of care and follow-up needed  Follow up in 3-5 days if no improvement in symptoms    I spent 15 minutes discussing patient's tobacco and nicotine use.  Patient endorses using nicotine " vape  They are not interested in quitting.  I discussed various health concerns relating to tobacco and nicotine use including hypertension, cancer, pulmonary/cardiovascular disease, as well as secondhand exposure risks.  I discussed potential cessation options such as tobacco/nicotine replacement products, cessation and reduction of tobacco intake, discussing with PCP about potential medication use, as well as psychotherapy use.           Differential diagnosis, natural history, supportive care, and indications for immediate follow-up discussed.    Advised the patient to follow-up with the primary care physician for recheck, reevaluation, and consideration of further management.    Please note that this dictation was created using voice recognition software. I have made a reasonable attempt to correct obvious errors, but I expect that there are errors of grammar and possibly content that I did not discover before finalizing the note.    This note was electronically signed by AFSHAN Lechuga.         [1]   Current Outpatient Medications:     dexamethasone (DECADRON) 0.5 MG/5ML Elixir, 5 mL swish and spit three to four times daily. It is important to keep the medication in the mouth for five minutes prior to spitting it out. Do not rinse afterward and avoid eating or drinking for 30 minutes., Disp: 237 mL, Rfl: 0    chlorhexidine (PERIDEX) 0.12 % Solution, Take 15 mL by mouth 2 times a day., Disp: 118 mL, Rfl: 0    hydrOXYzine HCl (ATARAX) 25 MG Tab, Take 0.5-2 Tablets by mouth 3 times a day as needed (insomnia)., Disp: 60 Tablet, Rfl: 0    escitalopram (LEXAPRO) 10 MG Tab, TAKE 1 & 1/2 (ONE & ONE-HALF) TABLETS BY MOUTH ONCE DAILY, Disp: 45 Tablet, Rfl: 5    CALCIUM PO, Take 1 Tab by mouth every day., Disp: , Rfl:     BIOTIN PO, Take 1 Tab by mouth every day., Disp: , Rfl:     omeprazole (PRILOSEC) 20 MG delayed-release capsule, Take 1 capsule by mouth every day., Disp: 30 capsule, Rfl: 11  [2] No Known  Allergies  [3]   Past Surgical History:  Procedure Laterality Date    GASTROSCOPY-ENDO N/A 9/17/2019    Procedure: GASTROSCOPY;  Surgeon: Xavi Ochoa M.D.;  Location: ENDOSCOPY [facility];  Service: Gastroenterology    ANTONIO BY LAPAROSCOPY  8/20/2019    Procedure: CHOLECYSTECTOMY, LAPAROSCOPIC;  Surgeon: Lior Olivo M.D.;  Location: SURGERY SAME DAY [facility];  Service: General    OTHER ABDOMINAL SURGERY  02/2018    gastric sleeve    BLADDER SLING FEMALE  12/3/2014    Performed by Darell Ferris M.D. at SURGERY [facility]    HYSTERECTOMY LAPAROSCOPY  2000    fibroidsand endometriosis    LUMPECTOMY      breast reduction    OTHER      BREAST REDUCTION. with implants    OTHER ORTHOPEDIC SURGERY      shoulder arth    TUBAL COAGULATION LAPAROSCOPIC BILATERAL

## 2025-06-12 NOTE — Clinical Note
REFERRAL APPROVAL NOTICE         Sent on June 12, 2025                   Nasima Beck  [address]  [place] [address]                   Dear Ms. Beck,    After a careful review of the medical information and benefit coverage, [facility] has processed your referral. See below for additional details.    If applicable, you must be actively enrolled with your insurance for coverage of the authorized service. If you have any questions regarding your coverage, please contact your insurance directly.    REFERRAL INFORMATION   Referral #:  [id number]  Referred-To Provider    Referred-By Provider:  Otolaryngology    MARK Lechuga   [facility]      [address]  [place] [address]  [phone] [address]  [place] [address]  [phone]    Referral Start Date:  06/05/2025  Referral End Date:   06/05/2026             SCHEDULING  If you do not already have an appointment, please call [phone] to make an appointment.     MORE INFORMATION  If you do not already have a [employer] account, sign up at: [email or web address]  You can access your medical information, make appointments, see lab results, billing information, and more.  If you have questions regarding this referral, please contact  the [facility] Referrals department at:             [phone]. Monday - Friday 8:00AM - 5:00PM.     Sincerely,    [facility]

## (undated) DEVICE — TUBING CLEARLINK DUO-VENT - C-FLO (48EA/CA)

## (undated) DEVICE — CATHETER IV 20 GA X 1-1/4 ---SURG.& SDS ONLY--- (50EA/BX)

## (undated) DEVICE — SUTURE GENERAL

## (undated) DEVICE — TROCAR Z THREAD11MM OPTICAL - NON BLADED(6/BX)

## (undated) DEVICE — MANIFOLD NEPTUNE 1 PORT (20/PK)

## (undated) DEVICE — DERMABOND ADVANCED - (12EA/BX)

## (undated) DEVICE — SENSOR SPO2 NEO LNCS ADHESIVE (20/BX) SEE USER NOTES

## (undated) DEVICE — TROCAR LAPSCP 100MM 12MM NTHRD - (6/BX)

## (undated) DEVICE — BAG RETRIEVAL 10ML (10EA/BX)

## (undated) DEVICE — SET LEADWIRE 5 LEAD BEDSIDE DISPOSABLE ECG (1SET OF 5/EA)

## (undated) DEVICE — SODIUM CHL IRRIGATION 0.9% 1000ML (12EA/CA)

## (undated) DEVICE — HEAD HOLDER JUNIOR/ADULT

## (undated) DEVICE — TUBE CONNECTING SUCTION - CLEAR PLASTIC STERILE 72 IN (50EA/CA)

## (undated) DEVICE — CANISTER SUCTION RIGID RED 1500CC (40EA/CA)

## (undated) DEVICE — PROTECTOR ULNA NERVE - (36PR/CA)

## (undated) DEVICE — CLIP MED LG INTNL HRZN TI ESCP - (20/BX)

## (undated) DEVICE — CANNULA W/SEAL 5X100 Z-THRE - ADED KII (12/BX)

## (undated) DEVICE — GLOVE BIOGEL SZ 8 SURGICAL PF LTX - (50PR/BX 4BX/CA)

## (undated) DEVICE — SLEEVE, VASO, THIGH, MED

## (undated) DEVICE — GOWN WARMING STANDARD FLEX - (30/CA)

## (undated) DEVICE — FILM CASSETTE ENDO

## (undated) DEVICE — ELECTRODE DUAL RETURN W/ CORD - (50/PK)

## (undated) DEVICE — TROCAR 5X100 BLADED Z-THREAD - KII (6/BX)

## (undated) DEVICE — LACTATED RINGERS INJ 1000 ML - (14EA/CA 60CA/PF)

## (undated) DEVICE — SOD. CHL 10CC SYRINGE PREFILL - W/10 CC (30/BX)

## (undated) DEVICE — BITE BLOCK ADULT 60FR (100EA/CA)

## (undated) DEVICE — SET SUCTION/IRRIGATION WITH DISPOSABLE TIP (6/CA )PART #0250-070-520 IS A SUB

## (undated) DEVICE — CANISTER SUCTION 3000ML MECHANICAL FILTER AUTO SHUTOFF MEDI-VAC NONSTERILE LF DISP  (40EA/CA)

## (undated) DEVICE — TUBING SETDISPOS HIGH FLOW II - (10/BX)

## (undated) DEVICE — SUTURE 0 VICRYL PLUS CT-2 - 27 INCH (36/BX)

## (undated) DEVICE — CHLORAPREP 26 ML APPLICATOR - ORANGE TINT(25/CA)

## (undated) DEVICE — TUBE NG SALEM SUMP 16FR (50EA/CA)

## (undated) DEVICE — KIT CUSTOM PROCEDURE SINGLE FOR ENDO  (15/CA)

## (undated) DEVICE — PACK LAP CHOLE OR - (2EA/CA)

## (undated) DEVICE — KIT ANESTHESIA W/CIRCUIT & 3/LT BAG W/FILTER (20EA/CA)

## (undated) DEVICE — KIT  I.V. START (100EA/CA)

## (undated) DEVICE — HEMOSTAT ENDOSCOPIC HEMOSPRAY 10FR

## (undated) DEVICE — SUCTION INSTRUMENT YANKAUER BULBOUS TIP W/O VENT (50EA/CA)

## (undated) DEVICE — HEMOSTAT ENDOSCOPIC HEMOSPRAY 7FR

## (undated) DEVICE — MASK ANESTHESIA ADULT  - (100/CA)

## (undated) DEVICE — TUBE E-T HI-LO CUFF 7.0MM (10EA/PK)

## (undated) DEVICE — SUTURE 4-0 VICRYL PLUS FS-2 - 27 INCH (36/BX)